# Patient Record
Sex: FEMALE | Race: ASIAN | Employment: OTHER | URBAN - METROPOLITAN AREA
[De-identification: names, ages, dates, MRNs, and addresses within clinical notes are randomized per-mention and may not be internally consistent; named-entity substitution may affect disease eponyms.]

---

## 2017-06-29 ENCOUNTER — OFFICE VISIT (OUTPATIENT)
Dept: INTERNAL MEDICINE CLINIC | Facility: CLINIC | Age: 72
End: 2017-06-29

## 2017-06-29 VITALS
BODY MASS INDEX: 27.09 KG/M2 | RESPIRATION RATE: 18 BRPM | SYSTOLIC BLOOD PRESSURE: 110 MMHG | HEART RATE: 72 BPM | HEIGHT: 60 IN | OXYGEN SATURATION: 98 % | TEMPERATURE: 98 F | WEIGHT: 138 LBS | DIASTOLIC BLOOD PRESSURE: 60 MMHG

## 2017-06-29 DIAGNOSIS — E55.9 VITAMIN D DEFICIENCY: ICD-10-CM

## 2017-06-29 DIAGNOSIS — E78.2 MIXED HYPERLIPIDEMIA: ICD-10-CM

## 2017-06-29 DIAGNOSIS — Z78.0 MENOPAUSE: ICD-10-CM

## 2017-06-29 DIAGNOSIS — Z12.31 VISIT FOR SCREENING MAMMOGRAM: ICD-10-CM

## 2017-06-29 DIAGNOSIS — I10 ESSENTIAL HYPERTENSION: Primary | ICD-10-CM

## 2017-06-29 PROCEDURE — 99204 OFFICE O/P NEW MOD 45 MIN: CPT | Performed by: INTERNAL MEDICINE

## 2017-06-29 RX ORDER — SIMVASTATIN 10 MG
TABLET ORAL
Qty: 90 TABLET | Refills: 4 | Status: SHIPPED | OUTPATIENT
Start: 2017-06-29 | End: 2018-07-17

## 2017-06-29 NOTE — PROGRESS NOTES
HPI:    Patient ID: Darryl Guo is a 67year old female. HPI    Patient is here to [facility]. 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      H/o HTN. Vitamin  D def, takes estrogen supplement. Since JESSICA BSO. Last mammogram was normal in 2015.   Patient  81 MG Oral Tab EC Take  by mouth. Disp:  Rfl:      Allergies:No Known Allergies   PHYSICAL EXAM:   Physical Exam   Constitutional: She is oriented to person, place, and time. She appears well-developed. No distress. HENT:   Head: Normocephalic.    Mouth/T 1,25 DIHYDROXY VITAMIN D (RENAL FAILURE) [32485][Q]    Meds This Visit:  Signed Prescriptions Disp Refills    simvastatin 10 MG Oral Tab 90 tablet 4      Sig: TAKE ONE TABLET BY MOUTH EVERY DAY IN THE EVENING      losartan 100 MG Oral Tab 90 tablet 3

## 2017-06-30 RX ORDER — LOSARTAN POTASSIUM 100 MG/1
100 TABLET ORAL DAILY
Qty: 90 TABLET | Refills: 3 | Status: SHIPPED | OUTPATIENT
Start: 2017-06-30 | End: 2017-07-11 | Stop reason: ALTCHOICE

## 2017-07-07 ENCOUNTER — LAB ENCOUNTER (OUTPATIENT)
Dept: LAB | Age: 72
End: 2017-07-07
Attending: INTERNAL MEDICINE
Payer: COMMERCIAL

## 2017-07-07 DIAGNOSIS — E78.2 MIXED HYPERLIPIDEMIA: ICD-10-CM

## 2017-07-07 DIAGNOSIS — I10 ESSENTIAL HYPERTENSION: ICD-10-CM

## 2017-07-07 LAB
ALBUMIN SERPL BCP-MCNC: 4.2 G/DL (ref 3.5–4.8)
ALBUMIN/GLOB SERPL: 1.6 {RATIO} (ref 1–2)
ALP SERPL-CCNC: 54 U/L (ref 32–100)
ALT SERPL-CCNC: 41 U/L (ref 14–54)
ANION GAP SERPL CALC-SCNC: 11 MMOL/L (ref 0–18)
AST SERPL-CCNC: 27 U/L (ref 15–41)
BASOPHILS # BLD: 0 K/UL (ref 0–0.2)
BASOPHILS NFR BLD: 1 %
BILIRUB SERPL-MCNC: 0.5 MG/DL (ref 0.3–1.2)
BUN SERPL-MCNC: 9 MG/DL (ref 8–20)
BUN/CREAT SERPL: 13.8 (ref 10–20)
CALCIUM SERPL-MCNC: 9.8 MG/DL (ref 8.5–10.5)
CHLORIDE SERPL-SCNC: 101 MMOL/L (ref 95–110)
CHOLEST SERPL-MCNC: 162 MG/DL (ref 110–200)
CO2 SERPL-SCNC: 27 MMOL/L (ref 22–32)
CREAT SERPL-MCNC: 0.65 MG/DL (ref 0.5–1.5)
EOSINOPHIL # BLD: 0.2 K/UL (ref 0–0.7)
EOSINOPHIL NFR BLD: 2 %
ERYTHROCYTE [DISTWIDTH] IN BLOOD BY AUTOMATED COUNT: 15.4 % (ref 11–15)
GLOBULIN PLAS-MCNC: 2.7 G/DL (ref 2.5–3.7)
GLUCOSE SERPL-MCNC: 90 MG/DL (ref 70–99)
HCT VFR BLD AUTO: 37.5 % (ref 35–48)
HDLC SERPL-MCNC: 62 MG/DL
HGB BLD-MCNC: 12.4 G/DL (ref 12–16)
LDLC SERPL CALC-MCNC: 65 MG/DL (ref 0–99)
LYMPHOCYTES # BLD: 2.1 K/UL (ref 1–4)
LYMPHOCYTES NFR BLD: 25 %
MCH RBC QN AUTO: 26.6 PG (ref 27–32)
MCHC RBC AUTO-ENTMCNC: 33.1 G/DL (ref 32–37)
MCV RBC AUTO: 80.3 FL (ref 80–100)
MONOCYTES # BLD: 0.7 K/UL (ref 0–1)
MONOCYTES NFR BLD: 8 %
NEUTROPHILS # BLD AUTO: 5.7 K/UL (ref 1.8–7.7)
NEUTROPHILS NFR BLD: 65 %
NONHDLC SERPL-MCNC: 100 MG/DL
OSMOLALITY UR CALC.SUM OF ELEC: 286 MOSM/KG (ref 275–295)
PLATELET # BLD AUTO: 236 K/UL (ref 140–400)
PMV BLD AUTO: 7.3 FL (ref 7.4–10.3)
POTASSIUM SERPL-SCNC: 4.5 MMOL/L (ref 3.3–5.1)
PROT SERPL-MCNC: 6.9 G/DL (ref 5.9–8.4)
RBC # BLD AUTO: 4.67 M/UL (ref 3.7–5.4)
SODIUM SERPL-SCNC: 139 MMOL/L (ref 136–144)
TRIGL SERPL-MCNC: 175 MG/DL (ref 1–149)
WBC # BLD AUTO: 8.8 K/UL (ref 4–11)

## 2017-07-07 PROCEDURE — 36415 COLL VENOUS BLD VENIPUNCTURE: CPT

## 2017-07-07 PROCEDURE — 80053 COMPREHEN METABOLIC PANEL: CPT

## 2017-07-07 PROCEDURE — 82652 VIT D 1 25-DIHYDROXY: CPT | Performed by: INTERNAL MEDICINE

## 2017-07-07 PROCEDURE — 85025 COMPLETE CBC W/AUTO DIFF WBC: CPT

## 2017-07-07 PROCEDURE — 80061 LIPID PANEL: CPT

## 2017-07-08 LAB — 1,25-DIHYDROXYVITAMIN D: 47.5 PG/ML

## 2017-07-10 ENCOUNTER — TELEPHONE (OUTPATIENT)
Dept: INTERNAL MEDICINE CLINIC | Facility: CLINIC | Age: 72
End: 2017-07-10

## 2017-07-11 RX ORDER — VALSARTAN 160 MG/1
160 TABLET ORAL DAILY
Qty: 90 TABLET | Refills: 2 | Status: SHIPPED | OUTPATIENT
Start: 2017-07-11 | End: 2018-07-17

## 2017-07-11 RX ORDER — OMEGA-3-ACID ETHYL ESTERS 1 G/1
1 CAPSULE, LIQUID FILLED ORAL DAILY
Qty: 90 CAPSULE | Refills: 3 | Status: SHIPPED | OUTPATIENT
Start: 2017-07-11 | End: 2018-07-17

## 2017-08-29 ENCOUNTER — HOSPITAL ENCOUNTER (OUTPATIENT)
Dept: MAMMOGRAPHY | Facility: HOSPITAL | Age: 72
Discharge: HOME | End: 2017-08-29
Attending: INTERNAL MEDICINE
Payer: COMMERCIAL

## 2017-08-29 ENCOUNTER — HOSPITAL ENCOUNTER (OUTPATIENT)
Dept: BONE DENSITY | Facility: HOSPITAL | Age: 72
Discharge: HOME | End: 2017-08-29
Attending: INTERNAL MEDICINE
Payer: COMMERCIAL

## 2017-08-29 DIAGNOSIS — Z78.0 MENOPAUSE: ICD-10-CM

## 2017-08-29 DIAGNOSIS — E55.9 VITAMIN D DEFICIENCY: ICD-10-CM

## 2017-08-29 DIAGNOSIS — Z12.31 VISIT FOR SCREENING MAMMOGRAM: ICD-10-CM

## 2017-08-29 PROCEDURE — 77067 SCR MAMMO BI INCL CAD: CPT | Performed by: INTERNAL MEDICINE

## 2017-08-29 PROCEDURE — 77080 DXA BONE DENSITY AXIAL: CPT | Performed by: INTERNAL MEDICINE

## 2017-10-03 ENCOUNTER — OFFICE VISIT (OUTPATIENT)
Dept: OPHTHALMOLOGY | Facility: CLINIC | Age: 72
End: 2017-10-03

## 2017-10-03 DIAGNOSIS — H43.393 VITREOUS FLOATERS OF BOTH EYES: ICD-10-CM

## 2017-10-03 DIAGNOSIS — H40.003 GLAUCOMA SUSPECT OF BOTH EYES: Primary | ICD-10-CM

## 2017-10-03 DIAGNOSIS — Z96.1 PSEUDOPHAKIA OF BOTH EYES: ICD-10-CM

## 2017-10-03 PROCEDURE — 92250 FUNDUS PHOTOGRAPHY W/I&R: CPT | Performed by: OPHTHALMOLOGY

## 2017-10-03 PROCEDURE — 92014 COMPRE OPH EXAM EST PT 1/>: CPT | Performed by: OPHTHALMOLOGY

## 2017-10-03 NOTE — PROGRESS NOTES
Bonnie Salgado is a 67year old female. HPI:     HPI     Here for an eye exam and photos. Pt denies any blurred vision OU at distance or near and is happy with her glasses and OTC readers. Pt has no ocular complaints.      Last edited by JOSUE Chaudhari Tab Take 1 tablet (160 mg total) by mouth daily. Disp: 90 tablet Rfl: 2   Misc Natural Products (FLEX-A-MIN JOINT FLEX OR) Take by mouth.  Takes two tablets daily Disp:  Rfl:    simvastatin 10 MG Oral Tab TAKE ONE TABLET BY MOUTH EVERY DAY IN THE Kaye Jesus -1.50 +0.75 105 +2.50    Type:  Progressive bifocal          Wearing Rx #2       Sphere Cylinder Axis Add    Right +2.50 Sphere      Left +2.50 Sphere      Type:  OTC reading only          Manifest Refraction     Pt is happy with current glasses

## 2017-10-21 ENCOUNTER — OFFICE VISIT (OUTPATIENT)
Dept: OPHTHALMOLOGY | Facility: CLINIC | Age: 72
End: 2017-10-21

## 2017-10-21 DIAGNOSIS — H40.003 GLAUCOMA SUSPECT OF BOTH EYES: ICD-10-CM

## 2017-10-21 PROCEDURE — 92083 EXTENDED VISUAL FIELD XM: CPT | Performed by: OPHTHALMOLOGY

## 2017-10-21 PROCEDURE — 92133 CPTRZD OPH DX IMG PST SGM ON: CPT | Performed by: OPHTHALMOLOGY

## 2017-10-21 NOTE — PROGRESS NOTES
Darryl Guo is a 67year old female. HPI:     HPI     Patient is here for a glaucoma work up with HVF and OCT, no PEDRO.MARIA ESTHER     Last edited by Terrence Burrell on 10/21/2017 10:03 AM. (History)        Patient History:  Past Medical History:   Diagnosis Date   • C (FLEX-A-MIN JOINT FLEX OR) Take by mouth.  Takes two tablets daily Disp:  Rfl:    simvastatin 10 MG Oral Tab TAKE ONE TABLET BY MOUTH EVERY DAY IN THE EVENING Disp: 90 tablet Rfl: 4   Estrogens Conjugated 0.3 MG Oral Tab Take 1.5 tablets (0.45 mg total) by

## 2018-07-17 ENCOUNTER — OFFICE VISIT (OUTPATIENT)
Dept: INTERNAL MEDICINE CLINIC | Facility: CLINIC | Age: 73
End: 2018-07-17
Payer: COMMERCIAL

## 2018-07-17 VITALS
HEART RATE: 55 BPM | HEIGHT: 60 IN | WEIGHT: 142 LBS | RESPIRATION RATE: 17 BRPM | SYSTOLIC BLOOD PRESSURE: 138 MMHG | TEMPERATURE: 98 F | DIASTOLIC BLOOD PRESSURE: 86 MMHG | BODY MASS INDEX: 27.88 KG/M2 | OXYGEN SATURATION: 98 %

## 2018-07-17 DIAGNOSIS — Z23 NEED FOR VACCINATION: ICD-10-CM

## 2018-07-17 DIAGNOSIS — M85.852 OSTEOPENIA OF NECK OF LEFT FEMUR: ICD-10-CM

## 2018-07-17 DIAGNOSIS — Z12.31 VISIT FOR SCREENING MAMMOGRAM: ICD-10-CM

## 2018-07-17 DIAGNOSIS — Z00.00 ENCOUNTER FOR ANNUAL HEALTH EXAMINATION: Primary | ICD-10-CM

## 2018-07-17 DIAGNOSIS — E78.2 MIXED HYPERLIPIDEMIA: ICD-10-CM

## 2018-07-17 DIAGNOSIS — E55.9 VITAMIN D DEFICIENCY: ICD-10-CM

## 2018-07-17 DIAGNOSIS — H25.019 CORTICAL AGE-RELATED CATARACT, UNSPECIFIED LATERALITY: ICD-10-CM

## 2018-07-17 LAB
ALBUMIN SERPL BCP-MCNC: 4.7 G/DL (ref 3.5–4.8)
ALBUMIN/GLOB SERPL: 1.6 {RATIO} (ref 1–2)
ALP SERPL-CCNC: 63 U/L (ref 32–100)
ALT SERPL-CCNC: 36 U/L (ref 14–54)
ANION GAP SERPL CALC-SCNC: 10 MMOL/L (ref 0–18)
AST SERPL-CCNC: 29 U/L (ref 15–41)
BASOPHILS # BLD: 0 K/UL (ref 0–0.2)
BASOPHILS NFR BLD: 1 %
BILIRUB SERPL-MCNC: 0.7 MG/DL (ref 0.3–1.2)
BUN SERPL-MCNC: 9 MG/DL (ref 8–20)
BUN/CREAT SERPL: 14.5 (ref 10–20)
CALCIUM SERPL-MCNC: 10.7 MG/DL (ref 8.5–10.5)
CHLORIDE SERPL-SCNC: 102 MMOL/L (ref 95–110)
CHOLEST SERPL-MCNC: 199 MG/DL (ref 110–200)
CO2 SERPL-SCNC: 27 MMOL/L (ref 22–32)
CREAT SERPL-MCNC: 0.62 MG/DL (ref 0.5–1.5)
EOSINOPHIL # BLD: 0.1 K/UL (ref 0–0.7)
EOSINOPHIL NFR BLD: 2 %
ERYTHROCYTE [DISTWIDTH] IN BLOOD BY AUTOMATED COUNT: 15.9 % (ref 11–15)
GLOBULIN PLAS-MCNC: 3 G/DL (ref 2.5–3.7)
GLUCOSE SERPL-MCNC: 61 MG/DL (ref 70–99)
HCT VFR BLD AUTO: 40.1 % (ref 35–48)
HDLC SERPL-MCNC: 78 MG/DL
HGB BLD-MCNC: 13 G/DL (ref 12–16)
LDLC SERPL CALC-MCNC: 94 MG/DL (ref 0–99)
LYMPHOCYTES # BLD: 2.1 K/UL (ref 1–4)
LYMPHOCYTES NFR BLD: 34 %
MCH RBC QN AUTO: 26.5 PG (ref 27–32)
MCHC RBC AUTO-ENTMCNC: 32.4 G/DL (ref 32–37)
MCV RBC AUTO: 81.8 FL (ref 80–100)
MONOCYTES # BLD: 0.5 K/UL (ref 0–1)
MONOCYTES NFR BLD: 8 %
NEUTROPHILS # BLD AUTO: 3.5 K/UL (ref 1.8–7.7)
NEUTROPHILS NFR BLD: 55 %
NONHDLC SERPL-MCNC: 121 MG/DL
OSMOLALITY UR CALC.SUM OF ELEC: 285 MOSM/KG (ref 275–295)
PATIENT FASTING: YES
PLATELET # BLD AUTO: 243 K/UL (ref 140–400)
PMV BLD AUTO: 7.6 FL (ref 7.4–10.3)
POTASSIUM SERPL-SCNC: 4.6 MMOL/L (ref 3.3–5.1)
PROT SERPL-MCNC: 7.7 G/DL (ref 5.9–8.4)
RBC # BLD AUTO: 4.91 M/UL (ref 3.7–5.4)
SODIUM SERPL-SCNC: 139 MMOL/L (ref 136–144)
TRIGL SERPL-MCNC: 134 MG/DL (ref 1–149)
WBC # BLD AUTO: 6.3 K/UL (ref 4–11)

## 2018-07-17 PROCEDURE — 80061 LIPID PANEL: CPT | Performed by: INTERNAL MEDICINE

## 2018-07-17 PROCEDURE — 82306 VITAMIN D 25 HYDROXY: CPT | Performed by: INTERNAL MEDICINE

## 2018-07-17 PROCEDURE — 99397 PER PM REEVAL EST PAT 65+ YR: CPT | Performed by: INTERNAL MEDICINE

## 2018-07-17 PROCEDURE — 80053 COMPREHEN METABOLIC PANEL: CPT | Performed by: INTERNAL MEDICINE

## 2018-07-17 PROCEDURE — 85025 COMPLETE CBC W/AUTO DIFF WBC: CPT | Performed by: INTERNAL MEDICINE

## 2018-07-17 RX ORDER — VALSARTAN 160 MG/1
160 TABLET ORAL DAILY
Qty: 90 TABLET | Refills: 4 | Status: SHIPPED | OUTPATIENT
Start: 2018-07-17 | End: 2018-07-21 | Stop reason: ALTCHOICE

## 2018-07-17 RX ORDER — SIMVASTATIN 10 MG
TABLET ORAL
Qty: 90 TABLET | Refills: 4 | Status: SHIPPED | OUTPATIENT
Start: 2018-07-17 | End: 2019-07-26

## 2018-07-17 RX ORDER — OMEGA-3-ACID ETHYL ESTERS 1 G/1
1 CAPSULE, LIQUID FILLED ORAL DAILY
Qty: 90 CAPSULE | Refills: 3 | Status: SHIPPED | OUTPATIENT
Start: 2018-07-17 | End: 2019-09-12

## 2018-07-17 NOTE — PROGRESS NOTES
HPI:   Jeff Segovia is a 68year old female who presents for a Medicare Subsequent Annual Wellness visit (Pt already had Initial Annual Wellness). Retired RN, h/o HTN, osteoarthritis, glaucoma. BP sometimes 150 to 160/80- 90.  Maintains active lifesty advance directive. Form given to her. She does NOT have a Power of  for [employer] on file in [place]. Discussed POA> form given.           She has never smoked tobacco.    CAGE Alcohol screening   Nemo Ortiz was screened for Alcohol abuse and THE EVENING      MEDICAL INFORMATION:   She  has a past medical history of Cataract (2007); Cataract (2007); H/O hemorrhoidectomy (1983); HTN (hypertension); Lipid screening (4/29/2014); Ophthalmological disorder (2007); and Vitreous floaters (2007).     Sh BMI 27.73 kg/m²  Estimated body mass index is 27.73 kg/m² as calculated from the following:    Height as of this encounter: 60\". Weight as of this encounter: 142 lb.     Medicare Hearing Assessment  (Required for AWV/SWV)    Whispered Voice  No impairme 12/31/2008   Deferred Date(s) Deferred   • Pneumovax 23 07/17/2018        ASSESSMENT AND OTHER RELEVANT CHRONIC CONDITIONS:   Monique Vázquez is a 68year old female who presents for a Medicare Assessment.      PLAN SUMMARY:   Diagnoses and all orders for this vi patient  PREVENTATIVE SERVICES  INDICATIONS AND SCHEDULE Internal Lab or Procedure External Lab or Procedure   Diabetes Screening      HbgA1C   Annually No results found for: A1C No flowsheet data found.     Fasting Blood Sugar (FSB)Annually   Glucose (mg/d pharmacy.  Please get once after your 65th birthday    Hepatitis B for Moderate/High Risk No vaccine history found Medium/high risk factors:   End-stage renal disease   Hemophiliacs who received Factor VIII or IX concentrates   Clients of institutions for t

## 2018-07-17 NOTE — PATIENT INSTRUCTIONS
Sandi Blank SCREENING SCHEDULE   Tests on this list are recommended by your physician but may not be covered, or covered at this frequency, by your insurer. Please check with your insurance carrier before scheduling to verify coverage.    PREVENTATIVE SER Colorectal Cancer Screening  Covered up to Age 76     Colonoscopy Screen   Covered every 10 years- more often if abnormal Colonoscopy,10 Years due on 07/09/2025 Update Health Maintenance if applicable    Flex Sigmoidoscopy Screen  Covered every 5 years N this or any previous visit.  Please get once after your 65th birthday    Pneumococcal 23 (Pneumovax)  Covered Once after 65   Orders placed or performed in visit on 07/17/18  -PNEUMOCOCCAL IMM (PNEUMOVAX)    Please get once after your 65th birthday    Nani Aragon

## 2018-07-18 PROBLEM — M85.852 OSTEOPENIA OF NECK OF LEFT FEMUR: Status: ACTIVE | Noted: 2018-07-18

## 2018-07-18 LAB — 25(OH)D3 SERPL-MCNC: 41.3 NG/ML

## 2018-09-04 ENCOUNTER — HOSPITAL ENCOUNTER (OUTPATIENT)
Dept: MAMMOGRAPHY | Facility: HOSPITAL | Age: 73
Discharge: HOME | End: 2018-09-04
Attending: INTERNAL MEDICINE
Payer: COMMERCIAL

## 2018-09-04 DIAGNOSIS — Z12.31 VISIT FOR SCREENING MAMMOGRAM: ICD-10-CM

## 2018-09-04 PROCEDURE — 77067 SCR MAMMO BI INCL CAD: CPT | Performed by: INTERNAL MEDICINE

## 2018-09-04 PROCEDURE — 77063 BREAST TOMOSYNTHESIS BI: CPT | Performed by: INTERNAL MEDICINE

## 2018-09-06 ENCOUNTER — TELEPHONE (OUTPATIENT)
Dept: INTERNAL MEDICINE CLINIC | Facility: CLINIC | Age: 73
End: 2018-09-06

## 2018-09-07 NOTE — TELEPHONE ENCOUNTER
Patient called again about her BP medication,     Taking valsartan before, but now on Irbesartan states that it is not strong enough -- SBP 140s-150s/DBP 80s-90s in the am after taking a dose at night.     Patient states she decided to take two tablets a da

## 2018-09-07 NOTE — TELEPHONE ENCOUNTER
Patient had increased Irbesartan to 150 mgs BID. Her  BP is now well controlled. P : Continue Irbesartan 150 mgs BID.

## 2018-11-08 ENCOUNTER — OFFICE VISIT (OUTPATIENT)
Dept: OPHTHALMOLOGY | Facility: CLINIC | Age: 73
End: 2018-11-08
Payer: COMMERCIAL

## 2018-11-08 DIAGNOSIS — H40.003 GLAUCOMA SUSPECT OF BOTH EYES: Primary | ICD-10-CM

## 2018-11-08 DIAGNOSIS — H43.393 VITREOUS FLOATERS OF BOTH EYES: ICD-10-CM

## 2018-11-08 DIAGNOSIS — Z96.1 PSEUDOPHAKIA OF BOTH EYES: ICD-10-CM

## 2018-11-08 PROCEDURE — 92014 COMPRE OPH EXAM EST PT 1/>: CPT | Performed by: OPHTHALMOLOGY

## 2018-11-08 NOTE — PATIENT INSTRUCTIONS
Glaucoma suspect  Patient is a glaucoma suspect due to increased cupping of the optic nerves in both eyes. Patient had normal glaucoma diagnostics last year. IOP is normal today. Optic nerves are stable.   There is no diagnosis of glaucoma at this time, b

## 2018-11-08 NOTE — PROGRESS NOTES
Garymeagan Jones is a 68year old female. HPI:     HPI     Pt denies any blurred vision at distance or near and is happy with her glasses. Pt has no ocular complaints.      Last edited by Chandni Biswas O.T. on 11/8/2018  1:39 PM. (History)        Patient H Tab TAKE ONE TABLET BY MOUTH EVERY DAY IN THE EVENING Disp: 90 tablet Rfl: 4   Misc Natural Products (FLEX-A-MIN JOINT FLEX OR) Take by mouth.  Takes two tablets daily Disp:  Rfl:    Estrogens Conjugated 0.3 MG Oral Tab Take 1.5 tablets (0.45 mg total) by m Progressive bifocal          Wearing Rx #2       Sphere Cylinder Axis Add    Right +2.50 Sphere      Left +2.50 Sphere      Type:  OTC reading only          Manifest Refraction    Pt is happy with current Rx                  ASSESSMENT/PLAN:     Diagnoses

## 2018-12-20 ENCOUNTER — OFFICE VISIT (OUTPATIENT)
Dept: INTERNAL MEDICINE CLINIC | Facility: CLINIC | Age: 73
End: 2018-12-20
Payer: COMMERCIAL

## 2018-12-20 VITALS
BODY MASS INDEX: 28.66 KG/M2 | RESPIRATION RATE: 19 BRPM | DIASTOLIC BLOOD PRESSURE: 60 MMHG | TEMPERATURE: 98 F | WEIGHT: 146 LBS | HEIGHT: 60 IN | OXYGEN SATURATION: 98 % | SYSTOLIC BLOOD PRESSURE: 120 MMHG | HEART RATE: 73 BPM

## 2018-12-20 DIAGNOSIS — E66.3 OVERWEIGHT (BMI 25.0-29.9): ICD-10-CM

## 2018-12-20 DIAGNOSIS — E83.52 HYPERCALCEMIA: ICD-10-CM

## 2018-12-20 DIAGNOSIS — I10 ESSENTIAL HYPERTENSION: Primary | ICD-10-CM

## 2018-12-20 DIAGNOSIS — E78.5 HYPERLIPIDEMIA LDL GOAL <100: ICD-10-CM

## 2018-12-20 DIAGNOSIS — Z23 NEED FOR PROPHYLACTIC VACCINATION WITH COMBINED DIPHTHERIA-TETANUS-PERTUSSIS (DTP) VACCINE: ICD-10-CM

## 2018-12-20 DIAGNOSIS — Z23 NEED FOR VACCINATION FOR ZOSTER: ICD-10-CM

## 2018-12-20 DIAGNOSIS — Z23 NEED FOR PROPHYLACTIC VACCINATION AGAINST STREPTOCOCCUS PNEUMONIAE (PNEUMOCOCCUS): ICD-10-CM

## 2018-12-20 PROCEDURE — 99214 OFFICE O/P EST MOD 30 MIN: CPT | Performed by: INTERNAL MEDICINE

## 2018-12-20 NOTE — PROGRESS NOTES
HPI:    Patient ID: Josie Chaidez is a 68year old female. HPI  Here for f/u. Doing well. Gained 4 kbs in last 6 mos. Keeping herself active. HTN : BP averaging 120/70s. Tolerated meds. Reports no chest pain, ATKINS.      Hyperlipidemia : No myalgia with st Disp:  Rfl:    Estrogens Conjugated 0.3 MG Oral Tab Take 1.5 tablets (0.45 mg total) by mouth daily. (Patient taking differently: Take 0.45 mg by mouth daily.  Takes 1/2 tab every 5 days ) Disp: 90 tablet Rfl: 3   Vitamin D3 (VITAMIN D3) 2000 UNITS Oral Cap repeat lab and hypercalcemia work up. PTH normal in the past.   Avoid Ca supplement. Adequate hydration     4. Overweight (BMI 25.0-29. 9)  Low fat, low carbs, lean protein diet. Aerobic exercise 30 mins 3 x weekly     5.  Need for prophylactic vaccination

## 2019-07-26 ENCOUNTER — HOSPITAL ENCOUNTER (OUTPATIENT)
Dept: GENERAL RADIOLOGY | Facility: HOSPITAL | Age: 74
Discharge: HOME | End: 2019-07-26
Attending: INTERNAL MEDICINE
Payer: COMMERCIAL

## 2019-07-26 ENCOUNTER — OFFICE VISIT (OUTPATIENT)
Dept: INTERNAL MEDICINE CLINIC | Facility: CLINIC | Age: 74
End: 2019-07-26
Payer: COMMERCIAL

## 2019-07-26 VITALS
HEART RATE: 60 BPM | WEIGHT: 147 LBS | RESPIRATION RATE: 17 BRPM | OXYGEN SATURATION: 97 % | DIASTOLIC BLOOD PRESSURE: 60 MMHG | HEIGHT: 60 IN | SYSTOLIC BLOOD PRESSURE: 118 MMHG | BODY MASS INDEX: 28.86 KG/M2

## 2019-07-26 DIAGNOSIS — E55.9 VITAMIN D DEFICIENCY: ICD-10-CM

## 2019-07-26 DIAGNOSIS — I10 ESSENTIAL HYPERTENSION: ICD-10-CM

## 2019-07-26 DIAGNOSIS — R06.00 DOE (DYSPNEA ON EXERTION): ICD-10-CM

## 2019-07-26 DIAGNOSIS — Z00.00 GENERAL MEDICAL EXAM: Primary | ICD-10-CM

## 2019-07-26 DIAGNOSIS — E78.5 HYPERLIPIDEMIA LDL GOAL <100: ICD-10-CM

## 2019-07-26 DIAGNOSIS — Z12.39 BREAST CANCER SCREENING: ICD-10-CM

## 2019-07-26 DIAGNOSIS — Z78.0 POSTMENOPAUSE: ICD-10-CM

## 2019-07-26 LAB
ALBUMIN SERPL-MCNC: 4.1 G/DL (ref 3.4–5)
ALBUMIN/GLOB SERPL: 1.2 {RATIO} (ref 1–2)
ALP LIVER SERPL-CCNC: 70 U/L (ref 55–142)
ALT SERPL-CCNC: 44 U/L (ref 13–56)
ANION GAP SERPL CALC-SCNC: 9 MMOL/L (ref 0–18)
AST SERPL-CCNC: 26 U/L (ref 15–37)
BASOPHILS # BLD AUTO: 0.04 X10(3) UL (ref 0–0.2)
BASOPHILS NFR BLD AUTO: 0.7 %
BILIRUB SERPL-MCNC: 0.5 MG/DL (ref 0.1–2)
BUN BLD-MCNC: 10 MG/DL (ref 7–18)
BUN/CREAT SERPL: 14.1 (ref 10–20)
CALCIUM BLD-MCNC: 9.6 MG/DL (ref 8.5–10.1)
CHLORIDE SERPL-SCNC: 105 MMOL/L (ref 98–112)
CHOLEST SMN-MCNC: 184 MG/DL (ref ?–200)
CO2 SERPL-SCNC: 29 MMOL/L (ref 21–32)
CREAT BLD-MCNC: 0.71 MG/DL (ref 0.55–1.02)
DEPRECATED RDW RBC AUTO: 46.1 FL (ref 35.1–46.3)
EOSINOPHIL # BLD AUTO: 0.16 X10(3) UL (ref 0–0.7)
EOSINOPHIL NFR BLD AUTO: 2.7 %
ERYTHROCYTE [DISTWIDTH] IN BLOOD BY AUTOMATED COUNT: 14.5 % (ref 11–15)
GLOBULIN PLAS-MCNC: 3.4 G/DL (ref 2.8–4.4)
GLUCOSE BLD-MCNC: 86 MG/DL (ref 70–99)
HCT VFR BLD AUTO: 40.1 % (ref 35–48)
HDLC SERPL-MCNC: 77 MG/DL (ref 40–59)
HGB BLD-MCNC: 12.5 G/DL (ref 12–16)
IMM GRANULOCYTES # BLD AUTO: 0.01 X10(3) UL (ref 0–1)
IMM GRANULOCYTES NFR BLD: 0.2 %
LDLC SERPL CALC-MCNC: 73 MG/DL (ref ?–100)
LYMPHOCYTES # BLD AUTO: 1.9 X10(3) UL (ref 1–4)
LYMPHOCYTES NFR BLD AUTO: 31.9 %
M PROTEIN MFR SERPL ELPH: 7.5 G/DL (ref 6.4–8.2)
MCH RBC QN AUTO: 27.2 PG (ref 26–34)
MCHC RBC AUTO-ENTMCNC: 31.2 G/DL (ref 31–37)
MCV RBC AUTO: 87.4 FL (ref 80–100)
MONOCYTES # BLD AUTO: 0.54 X10(3) UL (ref 0.1–1)
MONOCYTES NFR BLD AUTO: 9.1 %
NEUTROPHILS # BLD AUTO: 3.3 X10 (3) UL (ref 1.5–7.7)
NEUTROPHILS # BLD AUTO: 3.3 X10(3) UL (ref 1.5–7.7)
NEUTROPHILS NFR BLD AUTO: 55.4 %
NONHDLC SERPL-MCNC: 107 MG/DL (ref ?–130)
OSMOLALITY SERPL CALC.SUM OF ELEC: 294 MOSM/KG (ref 275–295)
PATIENT FASTING: YES
PATIENT FASTING: YES
PLATELET # BLD AUTO: 218 10(3)UL (ref 150–450)
POTASSIUM SERPL-SCNC: 4.6 MMOL/L (ref 3.5–5.1)
RBC # BLD AUTO: 4.59 X10(6)UL (ref 3.8–5.3)
SODIUM SERPL-SCNC: 143 MMOL/L (ref 136–145)
TRIGL SERPL-MCNC: 168 MG/DL (ref 30–149)
VLDLC SERPL CALC-MCNC: 34 MG/DL (ref 0–30)
WBC # BLD AUTO: 6 X10(3) UL (ref 4–11)

## 2019-07-26 PROCEDURE — 82306 VITAMIN D 25 HYDROXY: CPT | Performed by: INTERNAL MEDICINE

## 2019-07-26 PROCEDURE — 80061 LIPID PANEL: CPT | Performed by: INTERNAL MEDICINE

## 2019-07-26 PROCEDURE — 80053 COMPREHEN METABOLIC PANEL: CPT | Performed by: INTERNAL MEDICINE

## 2019-07-26 PROCEDURE — 99397 PER PM REEVAL EST PAT 65+ YR: CPT | Performed by: INTERNAL MEDICINE

## 2019-07-26 PROCEDURE — 71046 X-RAY EXAM CHEST 2 VIEWS: CPT | Performed by: INTERNAL MEDICINE

## 2019-07-26 PROCEDURE — 85025 COMPLETE CBC W/AUTO DIFF WBC: CPT | Performed by: INTERNAL MEDICINE

## 2019-07-26 PROCEDURE — 93000 ELECTROCARDIOGRAM COMPLETE: CPT | Performed by: INTERNAL MEDICINE

## 2019-07-26 RX ORDER — IRBESARTAN 150 MG/1
TABLET ORAL
Qty: 180 TABLET | Refills: 4 | Status: SHIPPED | OUTPATIENT
Start: 2019-07-26 | End: 2020-08-07

## 2019-07-26 RX ORDER — SIMVASTATIN 10 MG
TABLET ORAL
Qty: 90 TABLET | Refills: 4 | Status: SHIPPED | OUTPATIENT
Start: 2019-07-26 | End: 2020-08-11

## 2019-07-26 NOTE — PROGRESS NOTES
Pat De La Torre is a 76year old female who presents for a complete physical exam. Symptoms: denies discharge, itching, burning or dysuria, is menopausal.     H/o HTN, HLD. C/o chronic dry cough  X > 1 mo. She has ATKINS. Non-smoker, no asthma. Unable to keep up p (ASPIRIN EC LOW DOSE) 81 MG Oral Tab EC Take  by mouth.  Disp:  Rfl:       Past Medical History:   Diagnosis Date   • Cataract 2007    OD   • Cataract 2007    OS   • H/O hemorrhoidectomy 1983   • HTN (hypertension)    • Lipid screening 4/29/2014    per:NG pain   LUNGS: ATKINS  CARDIOVASCULAR: denies chest pain on exertion  GI: denies abdominal pain,denies heartburn  : denies dysuria, vaginal discharge or itching,p  MUSCULOSKELETAL: denies back pain  NEURO: denies headaches  PSYCHE: denies depression or anxie soon.  s     Body mass index is 28.71 kg/m². , recommended low fat , low carbs, high fiber, lean protein diett and aerobic exercise 30 minutes three times weekly as tolerated.      Postmenopause : check Bone dexa     PHQ2 score 0    The patient indicates und

## 2019-07-29 LAB — 25(OH)D3 SERPL-MCNC: 40.1 NG/ML (ref 30–100)

## 2019-08-01 DIAGNOSIS — I51.7 CARDIOMEGALY: ICD-10-CM

## 2019-08-01 DIAGNOSIS — R06.00 DOE (DYSPNEA ON EXERTION): Primary | ICD-10-CM

## 2019-09-10 ENCOUNTER — HOSPITAL ENCOUNTER (OUTPATIENT)
Dept: MAMMOGRAPHY | Facility: HOSPITAL | Age: 74
Discharge: HOME | End: 2019-09-10
Attending: INTERNAL MEDICINE
Payer: COMMERCIAL

## 2019-09-10 DIAGNOSIS — Z12.39 BREAST CANCER SCREENING: ICD-10-CM

## 2019-09-10 PROCEDURE — 77063 BREAST TOMOSYNTHESIS BI: CPT | Performed by: INTERNAL MEDICINE

## 2019-09-10 PROCEDURE — 77067 SCR MAMMO BI INCL CAD: CPT | Performed by: INTERNAL MEDICINE

## 2019-09-12 RX ORDER — OMEGA-3-ACID ETHYL ESTERS 1 G/1
1 CAPSULE, LIQUID FILLED ORAL DAILY
Qty: 90 CAPSULE | Refills: 3 | Status: SHIPPED | OUTPATIENT
Start: 2019-09-12 | End: 2019-09-13

## 2019-09-13 RX ORDER — OMEGA-3-ACID ETHYL ESTERS 1 G/1
1 CAPSULE, LIQUID FILLED ORAL DAILY
Qty: 90 CAPSULE | Refills: 3 | Status: SHIPPED | OUTPATIENT
Start: 2019-09-13 | End: 2020-08-11

## 2019-09-19 ENCOUNTER — HOSPITAL ENCOUNTER (OUTPATIENT)
Dept: CV DIAGNOSTICS | Facility: HOSPITAL | Age: 74
Discharge: HOME | End: 2019-09-19
Attending: INTERNAL MEDICINE
Payer: COMMERCIAL

## 2019-09-19 DIAGNOSIS — I51.7 CARDIOMEGALY: ICD-10-CM

## 2019-09-19 DIAGNOSIS — R06.00 DOE (DYSPNEA ON EXERTION): ICD-10-CM

## 2019-09-19 PROCEDURE — 93306 TTE W/DOPPLER COMPLETE: CPT | Performed by: INTERNAL MEDICINE

## 2019-11-21 ENCOUNTER — OFFICE VISIT (OUTPATIENT)
Dept: OPHTHALMOLOGY | Facility: CLINIC | Age: 74
End: 2019-11-21
Payer: COMMERCIAL

## 2019-11-21 DIAGNOSIS — H43.393 VITREOUS FLOATERS OF BOTH EYES: ICD-10-CM

## 2019-11-21 DIAGNOSIS — Z96.1 PSEUDOPHAKIA OF BOTH EYES: ICD-10-CM

## 2019-11-21 DIAGNOSIS — H40.003 GLAUCOMA SUSPECT OF BOTH EYES: Primary | ICD-10-CM

## 2019-11-21 PROCEDURE — 92014 COMPRE OPH EXAM EST PT 1/>: CPT | Performed by: OPHTHALMOLOGY

## 2019-11-21 PROCEDURE — 92015 DETERMINE REFRACTIVE STATE: CPT | Performed by: OPHTHALMOLOGY

## 2019-11-21 PROCEDURE — 92250 FUNDUS PHOTOGRAPHY W/I&R: CPT | Performed by: OPHTHALMOLOGY

## 2019-11-22 ENCOUNTER — TELEPHONE (OUTPATIENT)
Dept: OTOLARYNGOLOGY | Facility: CLINIC | Age: 74
End: 2019-11-22

## 2019-11-22 NOTE — TELEPHONE ENCOUNTER
Patient was scheduled for EP/ VF, OCT with no MD- with tech on 12/21 but patient asking to reschedule. Please advise.

## 2019-12-14 ENCOUNTER — NURSE ONLY (OUTPATIENT)
Dept: OPHTHALMOLOGY | Facility: CLINIC | Age: 74
End: 2019-12-14
Payer: COMMERCIAL

## 2019-12-14 DIAGNOSIS — H40.003 GLAUCOMA SUSPECT OF BOTH EYES: ICD-10-CM

## 2019-12-14 PROCEDURE — 92083 EXTENDED VISUAL FIELD XM: CPT | Performed by: OPHTHALMOLOGY

## 2019-12-14 PROCEDURE — 92133 CPTRZD OPH DX IMG PST SGM ON: CPT | Performed by: OPHTHALMOLOGY

## 2019-12-14 NOTE — PROGRESS NOTES
Lalo Winslow is a 76year old female.     HPI:     HPI     Here for a VF and OCT no MD.     Last edited by Serenity Curiel O.T. on 12/14/2019 12:07 PM. (History)        Patient History:  Past Medical History:   Diagnosis Date   • Cataract 2007    O EVERY DAY IN THE EVENING 90 tablet 4   • Misc Natural Products (FLEX-A-MIN JOINT FLEX OR) Take by mouth. Takes two tablets daily     • Vitamin D3 (VITAMIN D3) 2000 UNITS Oral Cap Take  by mouth. • Multiple Vitamins Oral Tab Take  by mouth.      • aspiri

## 2020-08-04 ENCOUNTER — TELEPHONE (OUTPATIENT)
Dept: INTERNAL MEDICINE CLINIC | Facility: CLINIC | Age: 75
End: 2020-08-04

## 2020-08-06 DIAGNOSIS — Z00.00 GENERAL MEDICAL EXAM: ICD-10-CM

## 2020-08-06 DIAGNOSIS — E55.9 VITAMIN D DEFICIENCY: ICD-10-CM

## 2020-08-06 DIAGNOSIS — I10 ESSENTIAL HYPERTENSION: ICD-10-CM

## 2020-08-06 DIAGNOSIS — E78.2 MIXED HYPERLIPIDEMIA: ICD-10-CM

## 2020-08-06 DIAGNOSIS — Z12.31 VISIT FOR SCREENING MAMMOGRAM: Primary | ICD-10-CM

## 2020-08-07 RX ORDER — IRBESARTAN 150 MG/1
TABLET ORAL
Qty: 180 TABLET | Refills: 3 | Status: SHIPPED | OUTPATIENT
Start: 2020-08-07 | End: 2020-08-11

## 2020-08-11 ENCOUNTER — OFFICE VISIT (OUTPATIENT)
Dept: INTERNAL MEDICINE CLINIC | Facility: CLINIC | Age: 75
End: 2020-08-11
Payer: COMMERCIAL

## 2020-08-11 VITALS
OXYGEN SATURATION: 96 % | RESPIRATION RATE: 22 BRPM | HEIGHT: 60 IN | BODY MASS INDEX: 28.27 KG/M2 | HEART RATE: 64 BPM | SYSTOLIC BLOOD PRESSURE: 130 MMHG | WEIGHT: 144 LBS | DIASTOLIC BLOOD PRESSURE: 60 MMHG | TEMPERATURE: 97 F

## 2020-08-11 DIAGNOSIS — I10 ESSENTIAL HYPERTENSION: ICD-10-CM

## 2020-08-11 DIAGNOSIS — Z12.31 ENCOUNTER FOR SCREENING MAMMOGRAM FOR MALIGNANT NEOPLASM OF BREAST: ICD-10-CM

## 2020-08-11 DIAGNOSIS — E78.5 HYPERLIPIDEMIA LDL GOAL <100: ICD-10-CM

## 2020-08-11 DIAGNOSIS — M25.562 CHRONIC PAIN OF LEFT KNEE: ICD-10-CM

## 2020-08-11 DIAGNOSIS — E55.9 VITAMIN D DEFICIENCY: ICD-10-CM

## 2020-08-11 DIAGNOSIS — Z00.00 ROUTINE GENERAL MEDICAL EXAMINATION AT A HEALTH CARE FACILITY: Primary | ICD-10-CM

## 2020-08-11 DIAGNOSIS — E78.2 MIXED HYPERLIPIDEMIA: ICD-10-CM

## 2020-08-11 DIAGNOSIS — G89.29 CHRONIC PAIN OF LEFT KNEE: ICD-10-CM

## 2020-08-11 DIAGNOSIS — E66.3 OVERWEIGHT (BMI 25.0-29.9): ICD-10-CM

## 2020-08-11 DIAGNOSIS — Z78.0 POSTMENOPAUSAL: ICD-10-CM

## 2020-08-11 LAB
ALBUMIN SERPL-MCNC: 4 G/DL (ref 3.4–5)
ALBUMIN/GLOB SERPL: 1.1 {RATIO} (ref 1–2)
ALP LIVER SERPL-CCNC: 81 U/L (ref 55–142)
ALT SERPL-CCNC: 32 U/L (ref 13–56)
ANION GAP SERPL CALC-SCNC: 7 MMOL/L (ref 0–18)
AST SERPL-CCNC: 18 U/L (ref 15–37)
BASOPHILS # BLD AUTO: 0.04 X10(3) UL (ref 0–0.2)
BASOPHILS NFR BLD AUTO: 0.6 %
BILIRUB SERPL-MCNC: 0.5 MG/DL (ref 0.1–2)
BUN BLD-MCNC: 10 MG/DL (ref 7–18)
BUN/CREAT SERPL: 14.9 (ref 10–20)
CALCIUM BLD-MCNC: 9.8 MG/DL (ref 8.5–10.1)
CHLORIDE SERPL-SCNC: 102 MMOL/L (ref 98–112)
CHOLEST SMN-MCNC: 192 MG/DL (ref ?–200)
CO2 SERPL-SCNC: 28 MMOL/L (ref 21–32)
CREAT BLD-MCNC: 0.67 MG/DL (ref 0.55–1.02)
DEPRECATED RDW RBC AUTO: 43.7 FL (ref 35.1–46.3)
EOSINOPHIL # BLD AUTO: 0.15 X10(3) UL (ref 0–0.7)
EOSINOPHIL NFR BLD AUTO: 2.3 %
ERYTHROCYTE [DISTWIDTH] IN BLOOD BY AUTOMATED COUNT: 14.3 % (ref 11–15)
GLOBULIN PLAS-MCNC: 3.5 G/DL (ref 2.8–4.4)
GLUCOSE BLD-MCNC: 84 MG/DL (ref 70–99)
HCT VFR BLD AUTO: 38.8 % (ref 35–48)
HDLC SERPL-MCNC: 74 MG/DL (ref 40–59)
HGB BLD-MCNC: 12.5 G/DL (ref 12–16)
IMM GRANULOCYTES # BLD AUTO: 0.02 X10(3) UL (ref 0–1)
IMM GRANULOCYTES NFR BLD: 0.3 %
LDLC SERPL CALC-MCNC: 89 MG/DL (ref ?–100)
LYMPHOCYTES # BLD AUTO: 2.05 X10(3) UL (ref 1–4)
LYMPHOCYTES NFR BLD AUTO: 32.1 %
M PROTEIN MFR SERPL ELPH: 7.5 G/DL (ref 6.4–8.2)
MCH RBC QN AUTO: 27.4 PG (ref 26–34)
MCHC RBC AUTO-ENTMCNC: 32.2 G/DL (ref 31–37)
MCV RBC AUTO: 84.9 FL (ref 80–100)
MONOCYTES # BLD AUTO: 0.61 X10(3) UL (ref 0.1–1)
MONOCYTES NFR BLD AUTO: 9.5 %
NEUTROPHILS # BLD AUTO: 3.52 X10 (3) UL (ref 1.5–7.7)
NEUTROPHILS # BLD AUTO: 3.52 X10(3) UL (ref 1.5–7.7)
NEUTROPHILS NFR BLD AUTO: 55.2 %
NONHDLC SERPL-MCNC: 118 MG/DL (ref ?–130)
OSMOLALITY SERPL CALC.SUM OF ELEC: 282 MOSM/KG (ref 275–295)
PATIENT FASTING Y/N/NP: YES
PATIENT FASTING Y/N/NP: YES
PLATELET # BLD AUTO: 220 10(3)UL (ref 150–450)
POTASSIUM SERPL-SCNC: 4.3 MMOL/L (ref 3.5–5.1)
RBC # BLD AUTO: 4.57 X10(6)UL (ref 3.8–5.3)
SODIUM SERPL-SCNC: 137 MMOL/L (ref 136–145)
TRIGL SERPL-MCNC: 145 MG/DL (ref 30–149)
VLDLC SERPL CALC-MCNC: 29 MG/DL (ref 0–30)
WBC # BLD AUTO: 6.4 X10(3) UL (ref 4–11)

## 2020-08-11 PROCEDURE — 3008F BODY MASS INDEX DOCD: CPT | Performed by: INTERNAL MEDICINE

## 2020-08-11 PROCEDURE — 85025 COMPLETE CBC W/AUTO DIFF WBC: CPT | Performed by: INTERNAL MEDICINE

## 2020-08-11 PROCEDURE — 99397 PER PM REEVAL EST PAT 65+ YR: CPT | Performed by: INTERNAL MEDICINE

## 2020-08-11 PROCEDURE — 80061 LIPID PANEL: CPT | Performed by: INTERNAL MEDICINE

## 2020-08-11 PROCEDURE — 80053 COMPREHEN METABOLIC PANEL: CPT | Performed by: INTERNAL MEDICINE

## 2020-08-11 PROCEDURE — 36415 COLL VENOUS BLD VENIPUNCTURE: CPT | Performed by: INTERNAL MEDICINE

## 2020-08-11 PROCEDURE — 82306 VITAMIN D 25 HYDROXY: CPT | Performed by: INTERNAL MEDICINE

## 2020-08-11 PROCEDURE — 3075F SYST BP GE 130 - 139MM HG: CPT | Performed by: INTERNAL MEDICINE

## 2020-08-11 PROCEDURE — 3078F DIAST BP <80 MM HG: CPT | Performed by: INTERNAL MEDICINE

## 2020-08-11 RX ORDER — IRBESARTAN 150 MG/1
TABLET ORAL
Qty: 180 TABLET | Refills: 3 | Status: ON HOLD | OUTPATIENT
Start: 2020-08-11 | End: 2021-02-03

## 2020-08-11 RX ORDER — SIMVASTATIN 10 MG
TABLET ORAL
Qty: 90 TABLET | Refills: 4 | Status: SHIPPED | OUTPATIENT
Start: 2020-08-11 | End: 2021-05-10

## 2020-08-11 RX ORDER — OMEGA-3-ACID ETHYL ESTERS 1 G/1
1 CAPSULE, LIQUID FILLED ORAL DAILY
Qty: 90 CAPSULE | Refills: 3 | Status: SHIPPED | OUTPATIENT
Start: 2020-08-11 | End: 2021-06-22

## 2020-08-11 NOTE — PROGRESS NOTES
Luis Alfredo Cunningham is a 76year old female who presents for a complete physical exam.    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 She has hypertension, hyperlipidemia, obesity, vitamin D deficiency, osteoarthritis. She adheres to low-salt, low-fat diet.   Eats vege (1 g total) by mouth daily. 90 capsule 3   • simvastatin 10 MG Oral Tab TAKE ONE TABLET BY MOUTH EVERY DAY IN THE EVENING 90 tablet 4   • Misc Natural Products (FLEX-A-MIN JOINT FLEX OR) Take by mouth.  Takes two tablets daily     • Vitamin D3 (VITAMIN D3) low carb diet     REVIEW OF SYSTEMS:   GENERAL: feels well otherwise  SKIN: denies any unusual skin lesions  EYES:denies blurred vision or double vision  HEENT: denies nasal congestion, sinus pain   LUNGS: denies shortness of breath with exertion  CARDIOVA Will taper to half tablet every 7 days and gradually DC. Order put in for mammogram and dexascan. Self breast exam explained. Health maintenance, will check fasting Lipids, CMP, and CBC. Colonoscopy benign on 7/9/2015. Repeat on 7/9/2025.

## 2020-08-11 NOTE — PROGRESS NOTES
Pt presented to clinic today for blood draw. Per physician able to draw orders. Orders  documented within chart. Pt tolerated lab draw well.  verified.   Orders drawn include: cbc, cmp, lipid, vit d  Site of draw: rt savannah Delgado CMA

## 2020-08-12 LAB — 25(OH)D3 SERPL-MCNC: 57.8 NG/ML (ref 30–100)

## 2020-08-12 NOTE — PATIENT INSTRUCTIONS
Eating Heart-Healthy Food: Using the [address] for your heart doesn’t have to be hard or boring. You just need to know how to make healthier choices. The DASH eating plan has been developed to help you do just that.  DASH stands for Dietary Appr Best choices: Skim or 1% milk, low-fat or fat-free yogurt or buttermilk, and low-fat cheeses.         Lean meats, poultry, fish  Servings: 6 or fewer a day  A serving is:  · 1 ounce cooked meats, poultry, or fish  · 1 egg  Best choices: Lean poultry and fi DASH eating plan Adopt a diet rich in fruits, vegetables, and low fat dairy products with reduced content of saturated and total fat.  8-14 mmHg   Dietary sodium reduction Reduce dietary sodium intake to <= 100 mmol per day (2.4 g sodium or 6 g sodium chlor

## 2020-09-17 ENCOUNTER — HOSPITAL ENCOUNTER (OUTPATIENT)
Dept: MAMMOGRAPHY | Facility: HOSPITAL | Age: 75
Discharge: HOME | End: 2020-09-17
Attending: INTERNAL MEDICINE
Payer: COMMERCIAL

## 2020-09-17 ENCOUNTER — HOSPITAL ENCOUNTER (OUTPATIENT)
Dept: BONE DENSITY | Facility: HOSPITAL | Age: 75
Discharge: HOME | End: 2020-09-17
Attending: INTERNAL MEDICINE
Payer: COMMERCIAL

## 2020-09-17 DIAGNOSIS — Z12.31 VISIT FOR SCREENING MAMMOGRAM: ICD-10-CM

## 2020-09-17 DIAGNOSIS — Z78.0 POSTMENOPAUSAL: ICD-10-CM

## 2020-09-17 PROCEDURE — 77080 DXA BONE DENSITY AXIAL: CPT | Performed by: INTERNAL MEDICINE

## 2020-09-17 PROCEDURE — 77063 BREAST TOMOSYNTHESIS BI: CPT | Performed by: INTERNAL MEDICINE

## 2020-09-17 PROCEDURE — 77067 SCR MAMMO BI INCL CAD: CPT | Performed by: INTERNAL MEDICINE

## 2020-12-15 NOTE — PROGRESS NOTES
Patient discharged to home per MD orders. Discharge instructions reviewed with patient. Questions encouraged and answered. Patient verbalizes understanding. Patient escorted by [facility] staff to private vehicle. Stable at discharge. Woodrow Mcburney is a 76year old female. HPI:     HPI     Pt is here for a complete exam and photos. Pt states vision is stable. Pt would like an updated glasses Rx today.      Last edited by Michael Brower OT on 11/21/2019  3:13 PM. (History) tab  tablet 4   • simvastatin 10 MG Oral Tab TAKE ONE TABLET BY MOUTH EVERY DAY IN THE EVENING 90 tablet 4   • Misc Natural Products (FLEX-A-MIN JOINT FLEX OR) Take by mouth.  Takes two tablets daily     • Vitamin D3 (VITAMIN D3) 2000 UNITS Oral Cap Sphere Cylinder [place] Add [place]    Right -1.00 +0.75 115 20/20 +2.50 20/20    Left -1.25 +0.75 105 20/20 +2.50 20/20          Final Rx       Sphere Cylinder [place] Add [place]    Right -1.00 +0.75 115 20/20 +2.50 20/20    Left -1.25 +0.

## 2021-01-01 ENCOUNTER — TELEPHONE (OUTPATIENT)
Dept: HEMATOLOGY/ONCOLOGY | Facility: HOSPITAL | Age: 76
End: 2021-01-01

## 2021-01-01 DIAGNOSIS — G89.3 CANCER RELATED PAIN: ICD-10-CM

## 2021-01-01 DIAGNOSIS — C79.51 METASTASIS TO VERTEBRAL COLUMN OF UNKNOWN ORIGIN (HCC): ICD-10-CM

## 2021-01-01 DIAGNOSIS — G47.9 DIFFICULTY SLEEPING: ICD-10-CM

## 2021-01-01 DIAGNOSIS — C80.1 METASTASIS TO VERTEBRAL COLUMN OF UNKNOWN ORIGIN (HCC): ICD-10-CM

## 2021-01-01 RX ORDER — MIRTAZAPINE 30 MG/1
TABLET, FILM COATED ORAL
Qty: 30 TABLET | Refills: 1 | Status: SHIPPED | OUTPATIENT
Start: 2021-01-01 | End: 2021-01-01

## 2021-01-01 RX ORDER — FENTANYL 75 UG/1
1 PATCH TRANSDERMAL
Qty: 10 PATCH | Refills: 0 | Status: CANCELLED | OUTPATIENT
Start: 2021-01-01

## 2021-01-01 RX ORDER — OSIMERTINIB 80 1/1
2 TABLET, FILM COATED ORAL DAILY
Qty: 60 TABLET | Refills: 11 | OUTPATIENT
Start: 2021-01-01

## 2021-01-01 RX ORDER — FENTANYL 75 UG/H
1 PATCH TRANSDERMAL
Qty: 10 PATCH | Refills: 0 | OUTPATIENT
Start: 2021-01-01

## 2021-01-22 ENCOUNTER — PATIENT MESSAGE (OUTPATIENT)
Dept: INTERNAL MEDICINE CLINIC | Facility: CLINIC | Age: 76
End: 2021-01-22

## 2021-01-25 NOTE — TELEPHONE ENCOUNTER
From: Gretta Chery  To: Marco Antonio Sosa MD  Sent: 1/22/2021 4:01 PM CST  Subject: Other    I hope this message find you well . Please let me know when do I receive my appointment for the [facility]  vaccination.  As you know I do have underlining conditio

## 2021-01-30 ENCOUNTER — HOSPITAL ENCOUNTER (INPATIENT)
Facility: HOSPITAL | Age: 76
LOS: 2 days | Discharge: HOME | DRG: 181 | End: 2021-02-03
Attending: EMERGENCY MEDICINE | Admitting: HOSPITALIST
Payer: MEDICARE

## 2021-01-30 ENCOUNTER — APPOINTMENT (OUTPATIENT)
Dept: CT IMAGING | Facility: HOSPITAL | Age: 76
DRG: 181 | End: 2021-01-30
Attending: EMERGENCY MEDICINE
Payer: MEDICARE

## 2021-01-30 DIAGNOSIS — C79.31 BRAIN METASTASIS (HCC): ICD-10-CM

## 2021-01-30 DIAGNOSIS — M54.16 LUMBAR RADICULOPATHY: ICD-10-CM

## 2021-01-30 DIAGNOSIS — R91.8 MASS OF LEFT LUNG: Primary | ICD-10-CM

## 2021-01-30 PROBLEM — E87.1 HYPONATREMIA: Status: ACTIVE | Noted: 2021-01-30

## 2021-01-30 PROBLEM — R73.9 HYPERGLYCEMIA: Status: ACTIVE | Noted: 2021-01-30

## 2021-01-30 LAB
ANION GAP SERPL CALC-SCNC: 6 MMOL/L (ref 0–18)
BASOPHILS # BLD AUTO: 0.03 X10(3) UL (ref 0–0.2)
BASOPHILS NFR BLD AUTO: 0.3 %
BILIRUB UR QL: NEGATIVE
BUN BLD-MCNC: 11 MG/DL (ref 7–18)
BUN/CREAT SERPL: 13.6 (ref 10–20)
CALCIUM BLD-MCNC: 10.7 MG/DL (ref 8.5–10.1)
CHLORIDE SERPL-SCNC: 97 MMOL/L (ref 98–112)
CLARITY UR: CLEAR
CO2 SERPL-SCNC: 28 MMOL/L (ref 21–32)
COLOR UR: COLORLESS
CREAT BLD-MCNC: 0.81 MG/DL
DEPRECATED RDW RBC AUTO: 39.9 FL (ref 35.1–46.3)
EOSINOPHIL # BLD AUTO: 0.12 X10(3) UL (ref 0–0.7)
EOSINOPHIL NFR BLD AUTO: 1.2 %
ERYTHROCYTE [DISTWIDTH] IN BLOOD BY AUTOMATED COUNT: 13.7 % (ref 11–15)
GLUCOSE BLD-MCNC: 125 MG/DL (ref 70–99)
GLUCOSE UR-MCNC: NEGATIVE MG/DL
HCT VFR BLD AUTO: 39.2 %
HGB BLD-MCNC: 12.6 G/DL
HYALINE CASTS #/AREA URNS AUTO: 1 /LPF
IMM GRANULOCYTES # BLD AUTO: 0.04 X10(3) UL (ref 0–1)
IMM GRANULOCYTES NFR BLD: 0.4 %
KETONES UR-MCNC: NEGATIVE MG/DL
LYMPHOCYTES # BLD AUTO: 1.4 X10(3) UL (ref 1–4)
LYMPHOCYTES NFR BLD AUTO: 14.1 %
MCH RBC QN AUTO: 25.8 PG (ref 26–34)
MCHC RBC AUTO-ENTMCNC: 32.1 G/DL (ref 31–37)
MCV RBC AUTO: 80.3 FL
MONOCYTES # BLD AUTO: 0.59 X10(3) UL (ref 0.1–1)
MONOCYTES NFR BLD AUTO: 5.9 %
NEUTROPHILS # BLD AUTO: 7.77 X10 (3) UL (ref 1.5–7.7)
NEUTROPHILS # BLD AUTO: 7.77 X10(3) UL (ref 1.5–7.7)
NEUTROPHILS NFR BLD AUTO: 78.1 %
NITRITE UR QL STRIP.AUTO: NEGATIVE
OSMOLALITY SERPL CALC.SUM OF ELEC: 273 MOSM/KG (ref 275–295)
PH UR: 7 [PH] (ref 5–8)
PLATELET # BLD AUTO: 251 10(3)UL (ref 150–450)
POTASSIUM SERPL-SCNC: 4.7 MMOL/L (ref 3.5–5.1)
PROT UR-MCNC: NEGATIVE MG/DL
RBC # BLD AUTO: 4.88 X10(6)UL
RBC #/AREA URNS AUTO: 2 /HPF
SARS-COV-2 RNA RESP QL NAA+PROBE: NOT DETECTED
SODIUM SERPL-SCNC: 131 MMOL/L (ref 136–145)
SP GR UR STRIP: 1.01 (ref 1–1.03)
TROPONIN I SERPL-MCNC: <0.045 NG/ML (ref ?–0.04)
UROBILINOGEN UR STRIP-ACNC: <2
WBC # BLD AUTO: 10 X10(3) UL (ref 4–11)
WBC #/AREA URNS AUTO: 1 /HPF

## 2021-01-30 PROCEDURE — 71260 CT THORAX DX C+: CPT | Performed by: EMERGENCY MEDICINE

## 2021-01-30 PROCEDURE — 74177 CT ABD & PELVIS W/CONTRAST: CPT | Performed by: EMERGENCY MEDICINE

## 2021-01-30 RX ORDER — MORPHINE SULFATE 2 MG/ML
2 INJECTION, SOLUTION INTRAMUSCULAR; INTRAVENOUS EVERY 4 HOURS PRN
Status: DISCONTINUED | OUTPATIENT
Start: 2021-01-30 | End: 2021-02-03

## 2021-01-30 RX ORDER — HYDROMORPHONE HYDROCHLORIDE 1 MG/ML
0.3 INJECTION, SOLUTION INTRAMUSCULAR; INTRAVENOUS; SUBCUTANEOUS
Status: DISCONTINUED | OUTPATIENT
Start: 2021-01-30 | End: 2021-01-30

## 2021-01-30 RX ORDER — HEPARIN SODIUM 5000 [USP'U]/ML
5000 INJECTION, SOLUTION INTRAVENOUS; SUBCUTANEOUS ONCE
Status: COMPLETED | OUTPATIENT
Start: 2021-01-30 | End: 2021-01-30

## 2021-01-30 RX ORDER — ONDANSETRON 2 MG/ML
4 INJECTION INTRAMUSCULAR; INTRAVENOUS EVERY 4 HOURS PRN
Status: DISCONTINUED | OUTPATIENT
Start: 2021-01-30 | End: 2021-02-03

## 2021-01-30 RX ORDER — DIAZEPAM 5 MG/ML
2.5 INJECTION, SOLUTION INTRAMUSCULAR; INTRAVENOUS ONCE
Status: COMPLETED | OUTPATIENT
Start: 2021-01-30 | End: 2021-01-30

## 2021-01-30 RX ORDER — MORPHINE SULFATE 4 MG/ML
2 INJECTION, SOLUTION INTRAMUSCULAR; INTRAVENOUS ONCE
Status: COMPLETED | OUTPATIENT
Start: 2021-01-30 | End: 2021-01-30

## 2021-01-30 RX ORDER — LABETALOL HYDROCHLORIDE 5 MG/ML
20 INJECTION, SOLUTION INTRAVENOUS ONCE
Status: DISCONTINUED | OUTPATIENT
Start: 2021-01-30 | End: 2021-02-03

## 2021-01-30 RX ORDER — ONDANSETRON 2 MG/ML
4 INJECTION INTRAMUSCULAR; INTRAVENOUS EVERY 6 HOURS PRN
Status: DISCONTINUED | OUTPATIENT
Start: 2021-01-30 | End: 2021-01-30

## 2021-01-30 RX ORDER — MORPHINE SULFATE 2 MG/ML
4 INJECTION, SOLUTION INTRAMUSCULAR; INTRAVENOUS EVERY 4 HOURS PRN
Status: DISCONTINUED | OUTPATIENT
Start: 2021-01-30 | End: 2021-02-03

## 2021-01-30 NOTE — ED INITIAL ASSESSMENT (HPI)
atraumatic lower back pain.  Denies dysuria       Also c/o htn sbp 200    Also now c/o hear palpatations

## 2021-01-31 LAB
APTT PPP: 25.8 SECONDS (ref 23.2–35.3)
INR BLD: 0.99 (ref 0.9–1.2)
PROTHROMBIN TIME: 12.9 SECONDS (ref 11.8–14.5)

## 2021-01-31 PROCEDURE — 99215 OFFICE O/P EST HI 40 MIN: CPT | Performed by: INTERNAL MEDICINE

## 2021-01-31 PROCEDURE — 99223 1ST HOSP IP/OBS HIGH 75: CPT | Performed by: HOSPITALIST

## 2021-01-31 RX ORDER — HEPARIN SODIUM 5000 [USP'U]/ML
5000 INJECTION, SOLUTION INTRAVENOUS; SUBCUTANEOUS 2 TIMES DAILY
Status: DISCONTINUED | OUTPATIENT
Start: 2021-01-31 | End: 2021-02-01

## 2021-01-31 RX ORDER — METOCLOPRAMIDE HYDROCHLORIDE 5 MG/ML
10 INJECTION INTRAMUSCULAR; INTRAVENOUS EVERY 6 HOURS PRN
Status: DISCONTINUED | OUTPATIENT
Start: 2021-01-31 | End: 2021-02-03

## 2021-01-31 RX ORDER — ACETAMINOPHEN AND CODEINE PHOSPHATE 300; 30 MG/1; MG/1
1 TABLET ORAL EVERY 4 HOURS PRN
Status: DISCONTINUED | OUTPATIENT
Start: 2021-01-31 | End: 2021-02-01

## 2021-01-31 RX ORDER — ASPIRIN 81 MG/1
81 TABLET ORAL DAILY
Status: DISCONTINUED | OUTPATIENT
Start: 2021-01-31 | End: 2021-02-01

## 2021-01-31 RX ORDER — PRAVASTATIN SODIUM 20 MG
20 TABLET ORAL NIGHTLY
Refills: 4 | Status: DISCONTINUED | OUTPATIENT
Start: 2021-01-31 | End: 2021-02-03

## 2021-01-31 RX ORDER — LOSARTAN POTASSIUM 50 MG/1
50 TABLET ORAL DAILY
Refills: 3 | Status: DISCONTINUED | OUTPATIENT
Start: 2021-01-31 | End: 2021-02-03

## 2021-01-31 NOTE — ED NOTES
Pt already reports significant improvement in pain following meds, resting in bed comfortably, previously sts she was not able to stay in bed and had to stay standing and \"hitting my back\" for some comfort. Pt instructed to stay in bed now d/t meds.     L

## 2021-01-31 NOTE — H&P
[facility]    PATIENT'S NAME: Doroteo BRUNNER Fasor 69.   ATTENDING PHYSICIAN: Zeb Willard DO   PATIENT ACCOUNT#:   [de-identified]    LOCATION:  [address] [place] [address] Rd #:   [id number]       YOB: 1945  ADMISSION DATE:       01 mass lesion. There is also some prominence of left hilum suggesting adenopathy, atelectasis or pneumonia or possibility malignancy was a larger concern and a PET scan and biopsy was recommended.   The scan also revealed diffuse fatty infiltration of the li . REVIEW OF SYSTEMS:  Twelve systems are reviewed. The patient did have some nausea and emesis, but that was after receiving IV pain medications. She denied any urinary symptoms. No diarrhea, melena, or hematochezia.   There are otherwise no ad MRI, would consult Pain Service if appropriate. 2.   Lung mass concerning for bronchogenic carcinoma. We will await patient's continued response to pain management, try oral Norco for pain as well. The patient is anxious for discharge.   3.   Low back pa

## 2021-01-31 NOTE — ED NOTES
Orders for admission, patient is aware of plan and ready to go upstairs. Any questions, please call ED DELONTE Bergman  at extension [phone].      Type of COVID test sent: Rapid  COVID Suspicion level: low  Drug(s) infusing: n/a  LOC at time of transport: a/ox4,

## 2021-01-31 NOTE — PROGRESS NOTES
ADMISSION NOTE    76year old female nonsmoker with h/o HTN and chronic intermittent mild low back pain presents with 3 week c/o gradually escalating low back pain with radicular symptoms over the past 2 days. Aslo c/o HTN and palpitations.   Work up [employer]

## 2021-01-31 NOTE — PLAN OF CARE
Pt alert and oriented x4. VSS. On room air. Tolerating diet, 1x emesis, 1x zofran given. No further nausea. Voiding in toilet. Tylenol #3 for pain control. Per pt, numbness and tingling in BLE \"better\". Pending MRI. Ambulating with steady gait.  Up in ron Consider cultural and social influences on pain and pain management  - Manage/alleviate anxiety  - Utilize distraction and/or relaxation techniques  - Monitor for opioid side effects  - Notify MD/LIP if interventions unsuccessful or patient reports new saad measures as appropriate  - Advance diet as tolerated, if ordered  - Obtain nutritional consult as needed  - Evaluate fluid balance  Outcome: Progressing  Goal: Maintains adequate nutritional intake (undernourished)  Description: INTERVENTIONS:  - Monitor p spinal or hip dislocation precautions)  Outcome: Progressing

## 2021-01-31 NOTE — CONSULTS
[facility] - [facility]    Consult Note    Date:  1/31/2021  Date of Admission:  1/30/2021      Chief Complaint:   Lenita Gowers is a(n) 76year old female with lung mass. HPI:   The patient is a lifetime non-smoker.   There is a family history EYE Right 6/6/15    RJM   • YAG CAPSULOTOMY - OS - LEFT EYE Left 06/16/2015    RJM     Family History   Problem Relation Age of Onset   • Hypertension Mother    • Lipids Mother         hyperlipidemia   • Glaucoma Mother    • Hypertension Father    • Heart pressure 122/62, pulse 67, temperature 98.9 °F (37.2 °C), temperature source Oral, resp. rate 16, height 5' (1.524 m), weight 144 lb (65.3 kg), SpO2 94 %, not currently breastfeeding.      Alert  female  HEENT examination is unremarkable with pupils eq to assist with Pat's care. I have spoken to the . Son did not  the phone. Left message to call back.       Becky Lehman MD  Medical Director, Critical Care, [facility]  Medical Director, [address] [place] [address]. [phone]  Pager: [phone]

## 2021-01-31 NOTE — PLAN OF CARE
Admission from ED with back pain radiating to bilateral lower extremities. A&O x4. VSS; pain treated with prn morphine. Vomiting x3 post dilaudid administration; prn zofran given and tolerated well. Remote tele without calls. Up with SBA.  Voiding freely in assistance  - Assess pain using appropriate pain scale  - Administer analgesics based on type and severity of pain and evaluate response  - Implement non-pharmacological measures as appropriate and evaluate response  - Consider cultural and social influenc Maintain adequate hydration with IV or PO as ordered and tolerated  - Nasogastric tube to low intermittent suction as ordered  - Evaluate effectiveness of ordered antiemetic medications  - Provide nonpharmacologic comfort measures as appropriate  - Advance affected body part  Description: INTERVENTIONS:  - Support and protect limb and body alignment per provider's orders  - Instruct and reinforce with patient and family use of appropriate assistive device and precautions (e.g. spinal or hip dislocation preca

## 2021-01-31 NOTE — ED PROVIDER NOTES
Patient Seen in: [facility] AND [facility] Emergency Department      History   Patient presents with:  Arrythmia/Palpitations  Hypertension  Back Pain    Stated Complaint: lower back pain x3 wks    HPI/Subjective:   HPI    Patient presents the emergency departme Drug use: Not on file             Review of Systems    Positive for stated complaint: lower back pain x3 wks  Other systems are as noted in HPI. Constitutional and vital signs reviewed. All other systems reviewed and negative except as noted above. deficit.                ED Course     Labs Reviewed   URINALYSIS WITH CULTURE REFLEX - Abnormal; Notable for the following components:       Result Value    Urine Color Colorless (*)     Blood Urine Small (*)     Leukocyte Esterase Urine Small (*)     Bacte descending thoracic aorta and the left pulmonary artery. This is less likely an area of atelectasis or pneumonia. Correlate with PET scanning and biopsy. Prominence of the left hilum may suggest adenopathy. Prominent lymph node in the AP window.  2. No (Principal) Lumbar radiculopathy M54.16 1/30/2021 Unknown    Hyperglycemia R73.9 1/30/2021 Yes    Hyponatremia E87.1 1/30/2021 Yes    Mass of left lung R91.8 1/30/2021

## 2021-02-01 ENCOUNTER — APPOINTMENT (OUTPATIENT)
Dept: MRI IMAGING | Facility: HOSPITAL | Age: 76
DRG: 181 | End: 2021-02-01
Attending: INTERNAL MEDICINE
Payer: MEDICARE

## 2021-02-01 ENCOUNTER — APPOINTMENT (OUTPATIENT)
Dept: MRI IMAGING | Facility: HOSPITAL | Age: 76
DRG: 181 | End: 2021-02-01
Attending: HOSPITALIST
Payer: MEDICARE

## 2021-02-01 PROBLEM — M48.50XA: Status: ACTIVE | Noted: 2021-02-01

## 2021-02-01 PROBLEM — E83.52 HYPERCALCEMIA: Status: ACTIVE | Noted: 2021-02-01

## 2021-02-01 PROBLEM — E27.8 ADRENAL MASS, LEFT (HCC): Status: ACTIVE | Noted: 2021-02-01

## 2021-02-01 PROCEDURE — 99233 SBSQ HOSP IP/OBS HIGH 50: CPT | Performed by: HOSPITALIST

## 2021-02-01 PROCEDURE — 72148 MRI LUMBAR SPINE W/O DYE: CPT | Performed by: HOSPITALIST

## 2021-02-01 PROCEDURE — 99232 SBSQ HOSP IP/OBS MODERATE 35: CPT | Performed by: INTERNAL MEDICINE

## 2021-02-01 PROCEDURE — 99254 IP/OBS CNSLTJ NEW/EST MOD 60: CPT | Performed by: INTERNAL MEDICINE

## 2021-02-01 PROCEDURE — 70553 MRI BRAIN STEM W/O & W/DYE: CPT | Performed by: INTERNAL MEDICINE

## 2021-02-01 RX ORDER — DEXTROSE AND SODIUM CHLORIDE 5; .9 G/100ML; G/100ML
INJECTION, SOLUTION INTRAVENOUS CONTINUOUS
Status: DISCONTINUED | OUTPATIENT
Start: 2021-02-02 | End: 2021-02-02

## 2021-02-01 RX ORDER — ACETAMINOPHEN AND CODEINE PHOSPHATE 300; 30 MG/1; MG/1
2 TABLET ORAL EVERY 6 HOURS PRN
Status: DISCONTINUED | OUTPATIENT
Start: 2021-02-01 | End: 2021-02-02

## 2021-02-01 NOTE — PLAN OF CARE
Pt alert and oriented x4. Forgetful at times. VSS. On room air. Tolerating diet, no nausea. Voiding in toilet. Tylenol #3 + hot packs for pain control. Per pt, numbness and tingling in BLE \"better\". MRI done. Ambulating with steady gait. Up in chair.  Mark analgesics based on type and severity of pain and evaluate response  - Implement non-pharmacological measures as appropriate and evaluate response  - Consider cultural and social influences on pain and pain management  - Manage/alleviate anxiety  - Utilize Nasogastric tube to low intermittent suction as ordered  - Evaluate effectiveness of ordered antiemetic medications  - Provide nonpharmacologic comfort measures as appropriate  - Advance diet as tolerated, if ordered  - Obtain nutritional consult as needed and body alignment per provider's orders  - Instruct and reinforce with patient and family use of appropriate assistive device and precautions (e.g. spinal or hip dislocation precautions)  Outcome: Progressing

## 2021-02-01 NOTE — CONSULTS
[facility]OSP - [facility]    Report of Hematology/Oncology Consultation    Clarissa Pina Patient Status:  Inpatient    1945 MRN [id number]   Location 465/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 She retired approximately 10 to 12 years ago. She states that she had had chronic intermittent lower back pain for years.   She states that it was more pronounced when she has to move patients at the While at the emergency department she had a CT scan of the chest, abdomen and pelvis which was negative for pulmonary embolus. It did reveal a 5 x 4.1 x 4.6 cm lesion at the superior segment of the left lower lobe in the posterior region.   This was extend An MRI of the lumbar spine was obtained today for further evaluation of the lower back pain. This showed an osseous metastases involving L1 vertebral body with pathologic fracture extending into the L1 pedicle.   There is no significant vertebral body heig •  Irbesartan 150 MG Oral Tab, TAKE 1 TABLET TWICE A DAY, Disp: 180 tablet, Rfl: 3    •  Omega-3-acid Ethyl Esters (LOVAZA) 1 g Oral Cap, Take 1 capsule (1 g total) by mouth daily. , Disp: 90 capsule, Rfl: 3    •  Estrogens Conjugated (PREMARIN) 0.3 MG Oral Eyes: Negative for eye problems. Respiratory: Positive for cough. Negative for shortness of breath. Cardiovascular: Negative for chest pain. Gastrointestinal: Positive for nausea and vomiting. Negative for abdominal pain, constipation and diarrhea. 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 No significant vertebral body height loss.   There is slight osseous retropulsion of the left posterior superior aspect of the L --Pulmonary and interventional radiology have been consulted. --Recommend CT-guided biopsy of the mass. --Given that the patient has evidence of metastatic disease.   Recommend for biopsy to be sent for PD-L1, EGFR, ALK, ROS–1, BRAF, RET, and MET.  --Give --This was mild, however there is no albumin to calculate the corrected calcium level. --We will order a CMP in the morning for further evaluation, to determine if patient would benefit from IV fluid hydration.   She is currently maintaining adequate oral

## 2021-02-01 NOTE — CM/SW NOTE
Per patient request, met with her and  at bedside. Patient reports her primary insurance is [employer] FEP PPO, which is confirmed on patient's facesheet.  She also provided copy of Medicare card w/ Part A listed (ID [id number]), notified admitting team

## 2021-02-01 NOTE — PROGRESS NOTES
[facility]    Progress Note    Bill Course German Maradiaga Patient Status:  Observation    1945 MRN [id number]   Location [facility] 4W/SW/SE Attending Joel Naranjo MD   Hosp Day # 0 PCP Rina Robb MD       Subjective:   Kareen Jean-Baptiste 06/28/2016    TROP <0.045 01/30/2021       Ct Chest+abdomen+pelvis(all Cntrst Only)(cpt=71260/37415)    Result Date: 1/30/2021  CONCLUSION:  1.  There is a large 5.0 x 4.1 x 4.6 cm area of consolidation in the superior segment of the left lower lobe worriso evaluation    Mass of left lung  -likely bronchogenic carcinoma, plan IR biopsy of L1 lesion    Hypertension  -Secondary to pain    Discussed with patient,  at bedsie, son         Greater than 35 minutes spent, >50% spent counseling re: treatment pl

## 2021-02-01 NOTE — PLAN OF CARE
No acute medical changes during shift. A&O x4. Pain treated with prn tylenol #3 and morphine. Remote tele. Up with SBA. Voiding freely in bathroom and tolerating a cardiac diet with better appetite; denies nausea.  Plan is MRI this AM and discussed plan of response  - Implement non-pharmacological measures as appropriate and evaluate response  - Consider cultural and social influences on pain and pain management  - Manage/alleviate anxiety  - Utilize distraction and/or relaxation techniques  - Monitor for op Evaluate effectiveness of ordered antiemetic medications  - Provide nonpharmacologic comfort measures as appropriate  - Advance diet as tolerated, if ordered  - Obtain nutritional consult as needed  - Evaluate fluid balance  Outcome: Progressing  Goal: Ольга Minor reinforce with patient and family use of appropriate assistive device and precautions (e.g. spinal or hip dislocation precautions)  Outcome: Progressing

## 2021-02-01 NOTE — PROGRESS NOTES
[facility]    Progress Note      Assessment and Plan:   1. Abnormal CT scan of the chest–MRI of the lumbar spine suggests metastatic lesion.     Recommendations:  1. Outpatient PET scan  2. IR biopsy of vertebral lesion  3.   Neurosurgica Director, [address], [facility] Director, [facility]  Pager: [phone]

## 2021-02-02 ENCOUNTER — APPOINTMENT (OUTPATIENT)
Dept: GENERAL RADIOLOGY | Facility: HOSPITAL | Age: 76
DRG: 181 | End: 2021-02-02
Attending: RADIOLOGY
Payer: MEDICARE

## 2021-02-02 ENCOUNTER — OFFICE VISIT (OUTPATIENT)
Dept: RADIATION ONCOLOGY | Facility: HOSPITAL | Age: 76
DRG: 181 | End: 2021-02-02
Attending: RADIOLOGY
Payer: MEDICARE

## 2021-02-02 ENCOUNTER — APPOINTMENT (OUTPATIENT)
Dept: CT IMAGING | Facility: HOSPITAL | Age: 76
DRG: 181 | End: 2021-02-02
Attending: INTERNAL MEDICINE
Payer: MEDICARE

## 2021-02-02 LAB
ALBUMIN SERPL-MCNC: 3.6 G/DL (ref 3.4–5)
ALBUMIN/GLOB SERPL: 1 {RATIO} (ref 1–2)
ALP LIVER SERPL-CCNC: 90 U/L
ALT SERPL-CCNC: 20 U/L
ANION GAP SERPL CALC-SCNC: 2 MMOL/L (ref 0–18)
AST SERPL-CCNC: 9 U/L (ref 15–37)
BASOPHILS # BLD AUTO: 0.05 X10(3) UL (ref 0–0.2)
BASOPHILS NFR BLD AUTO: 0.7 %
BILIRUB DIRECT SERPL-MCNC: <0.1 MG/DL (ref 0–0.2)
BILIRUB SERPL-MCNC: 0.4 MG/DL (ref 0.1–2)
BUN BLD-MCNC: 11 MG/DL (ref 7–18)
BUN/CREAT SERPL: 15.5 (ref 10–20)
CALCIUM BLD-MCNC: 9.4 MG/DL (ref 8.5–10.1)
CHLORIDE SERPL-SCNC: 104 MMOL/L (ref 98–112)
CO2 SERPL-SCNC: 31 MMOL/L (ref 21–32)
CREAT BLD-MCNC: 0.71 MG/DL
DEPRECATED RDW RBC AUTO: 40 FL (ref 35.1–46.3)
EOSINOPHIL # BLD AUTO: 0.31 X10(3) UL (ref 0–0.7)
EOSINOPHIL NFR BLD AUTO: 4.1 %
ERYTHROCYTE [DISTWIDTH] IN BLOOD BY AUTOMATED COUNT: 13.6 % (ref 11–15)
GLOBULIN PLAS-MCNC: 3.7 G/DL (ref 2.8–4.4)
GLUCOSE BLD-MCNC: 118 MG/DL (ref 70–99)
HAV IGM SER QL: 2.3 MG/DL (ref 1.6–2.6)
HCT VFR BLD AUTO: 35 %
HGB BLD-MCNC: 11.3 G/DL
IMM GRANULOCYTES # BLD AUTO: 0.03 X10(3) UL (ref 0–1)
IMM GRANULOCYTES NFR BLD: 0.4 %
LYMPHOCYTES # BLD AUTO: 1.62 X10(3) UL (ref 1–4)
LYMPHOCYTES NFR BLD AUTO: 21.6 %
M PROTEIN MFR SERPL ELPH: 7.3 G/DL (ref 6.4–8.2)
MCH RBC QN AUTO: 26.1 PG (ref 26–34)
MCHC RBC AUTO-ENTMCNC: 32.3 G/DL (ref 31–37)
MCV RBC AUTO: 80.8 FL
MONOCYTES # BLD AUTO: 0.83 X10(3) UL (ref 0.1–1)
MONOCYTES NFR BLD AUTO: 11.1 %
NEUTROPHILS # BLD AUTO: 4.65 X10 (3) UL (ref 1.5–7.7)
NEUTROPHILS # BLD AUTO: 4.65 X10(3) UL (ref 1.5–7.7)
NEUTROPHILS NFR BLD AUTO: 62.1 %
OSMOLALITY SERPL CALC.SUM OF ELEC: 284 MOSM/KG (ref 275–295)
PLATELET # BLD AUTO: 240 10(3)UL (ref 150–450)
POTASSIUM SERPL-SCNC: 5 MMOL/L (ref 3.5–5.1)
RBC # BLD AUTO: 4.33 X10(6)UL
SODIUM SERPL-SCNC: 137 MMOL/L (ref 136–145)
WBC # BLD AUTO: 7.5 X10(3) UL (ref 4–11)

## 2021-02-02 PROCEDURE — 71045 X-RAY EXAM CHEST 1 VIEW: CPT | Performed by: RADIOLOGY

## 2021-02-02 PROCEDURE — 32408 CORE NDL BX LNG/MED PERQ: CPT | Performed by: INTERNAL MEDICINE

## 2021-02-02 PROCEDURE — 0BBJ3ZX EXCISION OF LEFT LOWER LUNG LOBE, PERCUTANEOUS APPROACH, DIAGNOSTIC: ICD-10-PCS | Performed by: RADIOLOGY

## 2021-02-02 PROCEDURE — 99233 SBSQ HOSP IP/OBS HIGH 50: CPT | Performed by: INTERNAL MEDICINE

## 2021-02-02 PROCEDURE — 99233 SBSQ HOSP IP/OBS HIGH 50: CPT | Performed by: HOSPITALIST

## 2021-02-02 PROCEDURE — 99152 MOD SED SAME PHYS/QHP 5/>YRS: CPT | Performed by: INTERNAL MEDICINE

## 2021-02-02 PROCEDURE — 99232 SBSQ HOSP IP/OBS MODERATE 35: CPT | Performed by: INTERNAL MEDICINE

## 2021-02-02 RX ORDER — DEXAMETHASONE 4 MG/1
4 TABLET ORAL EVERY 12 HOURS SCHEDULED
Status: DISCONTINUED | OUTPATIENT
Start: 2021-02-02 | End: 2021-02-03

## 2021-02-02 RX ORDER — ONDANSETRON 4 MG/1
4 TABLET, ORALLY DISINTEGRATING ORAL EVERY 8 HOURS PRN
Qty: 60 TABLET | Refills: 0 | Status: SHIPPED | OUTPATIENT
Start: 2021-02-02 | End: 2021-02-03

## 2021-02-02 RX ORDER — MIDAZOLAM HYDROCHLORIDE 1 MG/ML
1 INJECTION INTRAMUSCULAR; INTRAVENOUS EVERY 5 MIN PRN
Status: DISCONTINUED | OUTPATIENT
Start: 2021-02-02 | End: 2021-02-03

## 2021-02-02 RX ORDER — MORPHINE SULFATE 15 MG/1
15 TABLET ORAL EVERY 4 HOURS PRN
Status: DISCONTINUED | OUTPATIENT
Start: 2021-02-02 | End: 2021-02-03

## 2021-02-02 RX ORDER — MIDAZOLAM HYDROCHLORIDE 1 MG/ML
INJECTION INTRAMUSCULAR; INTRAVENOUS
Status: COMPLETED
Start: 2021-02-02 | End: 2021-02-02

## 2021-02-02 RX ORDER — SENNOSIDES 8.6 MG
8.6 TABLET ORAL 2 TIMES DAILY
Status: DISCONTINUED | OUTPATIENT
Start: 2021-02-02 | End: 2021-02-03

## 2021-02-02 RX ORDER — SENNA PLUS 8.6 MG/1
8.6 TABLET ORAL 2 TIMES DAILY
Qty: 60 TABLET | Refills: 0 | Status: SHIPPED | OUTPATIENT
Start: 2021-02-02 | End: 2021-02-16 | Stop reason: DRUGHIGH

## 2021-02-02 RX ORDER — NALOXONE HYDROCHLORIDE 0.4 MG/ML
80 INJECTION, SOLUTION INTRAMUSCULAR; INTRAVENOUS; SUBCUTANEOUS AS NEEDED
Status: DISCONTINUED | OUTPATIENT
Start: 2021-02-02 | End: 2021-02-03

## 2021-02-02 RX ORDER — FLUMAZENIL 0.1 MG/ML
0.2 INJECTION, SOLUTION INTRAVENOUS AS NEEDED
Status: DISCONTINUED | OUTPATIENT
Start: 2021-02-02 | End: 2021-02-03

## 2021-02-02 RX ORDER — SODIUM CHLORIDE 9 MG/ML
INJECTION, SOLUTION INTRAVENOUS CONTINUOUS
Status: DISCONTINUED | OUTPATIENT
Start: 2021-02-02 | End: 2021-02-02

## 2021-02-02 RX ORDER — MORPHINE SULFATE 15 MG/1
15 TABLET, FILM COATED, EXTENDED RELEASE ORAL EVERY 12 HOURS SCHEDULED
Status: DISCONTINUED | OUTPATIENT
Start: 2021-02-02 | End: 2021-02-02

## 2021-02-02 NOTE — PROGRESS NOTES
1055  Patient arrival to CT room. Dr. Araceli [place] present. Consent reviewed and signed. Patient prone on table. Monitor applied, VSS. IV flushed and patent.  Scouts taken    1110 Time out taken  1111 Chlora prep applied and lidocaine injected    1119 Pathology pre

## 2021-02-02 NOTE — PLAN OF CARE
Problem: Patient Centered Care  Goal: Patient preferences are identified and integrated in the patient's plan of care  Description: Interventions:  - What would you like us to know as we care for you?  I lived in [place] for 50 years and moved to the [facility] appropriate  Outcome: Progressing     Problem: SAFETY ADULT - FALL  Goal: Free from fall injury  Description: INTERVENTIONS:  - Assess pt frequently for physical needs  - Identify cognitive and physical deficits and behaviors that affect risk of falls.   - decreased intake, treat as appropriate  - Assist with meals as needed  - Monitor I&O, WT and lab values  - Obtain nutritional consult as needed  - Optimize oral hygiene and moisture  - Encourage food from home; allow for food preferences  - Enhance eating

## 2021-02-02 NOTE — BRIEF PROCEDURE NOTE
[facility] - [facility]  Procedure Note    Bill Course Dolphus Chin Patient Status:  Inpatient    1945 MRN [id number]   Location [facility] 4W/SW/SE Attending Joel Naranjo MD   Hosp Day # 1 PCP Rina Robb MD     Procedure: CT-guide

## 2021-02-02 NOTE — PLAN OF CARE
Problem: Patient Centered Care  Goal: Patient preferences are identified and integrated in the patient's plan of care  Description: Interventions:  - What would you like us to know as we care for you?  I lived in [place] for 50 years and moved to the [facility] appropriate  Outcome: Progressing     Problem: SAFETY ADULT - FALL  Goal: Free from fall injury  Description: INTERVENTIONS:  - Assess pt frequently for physical needs  - Identify cognitive and physical deficits and behaviors that affect risk of falls.   - decreased intake, treat as appropriate  - Assist with meals as needed  - Monitor I&O, WT and lab values  - Obtain nutritional consult as needed  - Optimize oral hygiene and moisture  - Encourage food from home; allow for food preferences  - Enhance eating measures in place. Frequent rounding being done.

## 2021-02-02 NOTE — PROGRESS NOTES
[facility]    Progress Note      Assessment and Plan:   1. Lung lesion with brain and osseous metastatic involvement –MRI of the lumbar spine suggests metastatic lesion. Also, MRI of the brain suggests multiple subcentimeter metastases. 02/02/2021   MRI of the lumbar spine–Osseous metastasis involving the L1 vertebral body with a pathologic fracture extending into the left L1 pedicle. No significant vertebral body height loss.      There is slight osseous retropulsion of the left posterio

## 2021-02-02 NOTE — CONSULTS
RADIATION ONCOLOGY NOTE    DATE OF VISIT: 2/2/2021    DIAGNOSIS : Likely lung cancer with symptomatic spine and brain metastases biopsy pending      Dear Roberto Chou and colleagues,    As you recall, Tony Valencia is a pleasant 76year old female, 2/1/2021  CONCLUSION:   Osseous metastasis involving the L1 vertebral body with a pathologic fracture extending into the left L1 pedicle. No significant vertebral body height loss.   There is slight osseous retropulsion of the left posterior superior aspec Medication Sig Dispense Refill   • Senna 8.6 MG Oral Tab Take 1 tablet (8.6 mg total) by mouth 2 (two) times daily. 60 tablet 0   • ondansetron 4 MG Oral Tablet Dispersible Take 1 tablet (4 mg total) by mouth every 8 (eight) hours as needed for Nausea.  6 with no cervical, supraclavicular or axillary lymphadenopathy. CHEST:  Clear   ABDOMEN:  Soft with no masses. EXTREMITIES:  There is no upper or lower extremity edema. NEUROLOGIC:  Cranial nerves II-XII are intact.   Neuro exam is nonfocal.    COMPLE injection 0.2 mg, 0.2 mg, Intravenous, PRN    •  Midazolam HCl (VERSED) 2 MG/2ML injection, , ,     •  fentaNYL citrate (SUBLIMAZE) 0.05 MG/ML injection, , ,     •  dextrose 5 % and 0.9 % NaCl infusion, , Intravenous, Continuous    •  losartan Potassium (C mg/dL   CBC W/ DIFFERENTIAL    Collection Time: 02/02/21  6:04 AM   Result Value Ref Range    WBC 7.5 4.0 - 11.0 x10(3) uL    RBC 4.33 3.80 - 5.30 x10(6)uL    HGB 11.3 (L) 12.0 - 16.0 g/dL    HCT 35.0 35.0 - 48.0 %    MCV 80.8 80.0 - 100.0 fL    MCH 26.1 2

## 2021-02-02 NOTE — CONSULTS
[facility]    Neurosurgery Consultation    Trena Doe Patient Status:  Inpatient    1945 MRN [id number]   Location [facility] 4W/SW/SE Attending Diana Ladd MD   Hosp Day # 1 PCP Zena García MD     Date of 06/16/2015    DOMITILA     Family History   Problem Relation Age of Onset   • Hypertension Mother    • Lipids Mother         hyperlipidemia   • Glaucoma Mother    • Hypertension Father    • Heart Disease Father    • Stroke Father 64        (cause of death)   • with no rebound or guarding. No peritoneal signs. Extremities:  Non-tender, no lower extremity edema noted.       Labs:  Lab Results   Component Value Date    WBC 7.5 02/02/2021    HGB 11.3 (L) 02/02/2021    .0 02/02/2021    BUN 11 02/02/2021 which appears to infiltrate into to the space between the descending thoracic aorta and the left pulmonary artery. This is less likely an area of atelectasis or pneumonia. Correlate with PET scanning and biopsy.   Prominence of the left hilum may suggest issues.     David Osborn  2/2/2021  7:25 AM

## 2021-02-02 NOTE — PROGRESS NOTES
[facility]    Progress Note    Ladevinkeven Shira Shelby Patient Status:  Observation    1945 MRN [id number]   Location [facility] 4W/SW/SE Attending Sharlene aRymond MD   Hosp Day # 1 PCP Magaly Garcia MD       Subjective:   Marissa Waters MG 2.3 02/02/2021    TROP <0.045 01/30/2021       Mri Brain (w+wo) (cpt=70553)    Result Date: 2/1/2021  CONCLUSION:  1. Abnormal exam with multiple subcentimeter supra- and infra-tentorial enhancing foci compatible with metastasis.  2.  Cranial bone metas today  -plan NS eval--> angelika eval, no indication for acute intervention  -Pain control--> does not tolerate vicodin, plan start dexamethasone 4mg bid and  msir after procedure   -Plan oncology evaluation--> appreciated  -MRI brain--> shows multiple subcenti

## 2021-02-03 VITALS
WEIGHT: 144 LBS | BODY MASS INDEX: 28.27 KG/M2 | DIASTOLIC BLOOD PRESSURE: 90 MMHG | RESPIRATION RATE: 20 BRPM | SYSTOLIC BLOOD PRESSURE: 151 MMHG | OXYGEN SATURATION: 96 % | HEIGHT: 60 IN | HEART RATE: 73 BPM | TEMPERATURE: 99 F

## 2021-02-03 PROCEDURE — 99239 HOSP IP/OBS DSCHRG MGMT >30: CPT | Performed by: HOSPITALIST

## 2021-02-03 PROCEDURE — 99232 SBSQ HOSP IP/OBS MODERATE 35: CPT | Performed by: INTERNAL MEDICINE

## 2021-02-03 RX ORDER — MORPHINE SULFATE 15 MG/1
15 TABLET ORAL EVERY 4 HOURS PRN
Qty: 30 TABLET | Refills: 0 | Status: SHIPPED | OUTPATIENT
Start: 2021-02-03 | End: 2021-02-16

## 2021-02-03 RX ORDER — LOSARTAN POTASSIUM 50 MG/1
50 TABLET ORAL DAILY
Qty: 30 TABLET | Refills: 3 | Status: ON HOLD | OUTPATIENT
Start: 2021-02-04 | End: 2021-02-27

## 2021-02-03 RX ORDER — DEXAMETHASONE 4 MG/1
4 TABLET ORAL
Qty: 30 TABLET | Refills: 0 | Status: ON HOLD | OUTPATIENT
Start: 2021-02-03 | End: 2021-02-27

## 2021-02-03 RX ORDER — DEXAMETHASONE 4 MG/1
4 TABLET ORAL EVERY 12 HOURS SCHEDULED
Qty: 60 TABLET | Refills: 0 | Status: SHIPPED | OUTPATIENT
Start: 2021-02-03 | End: 2021-02-03

## 2021-02-03 RX ORDER — ONDANSETRON 4 MG/1
4 TABLET, ORALLY DISINTEGRATING ORAL EVERY 8 HOURS PRN
Qty: 60 TABLET | Refills: 0 | Status: SHIPPED | OUTPATIENT
Start: 2021-02-03 | End: 2021-07-27

## 2021-02-03 NOTE — DISCHARGE SUMMARY
[facility]    Discharge Summary    Margarita Stuart Patient Status:  Inpatient    1945 MRN [id number]   Location [facility] 4W/SW/SE Attending Sharath Milner MD   Hosp Day # 2 PCP Brianna Mnadujano MD     Date of Admission: never had radicular symptoms before and the pain had been mild. She has never sought medical attention for it previously.   For the past 3 weeks, the discomfort has been more severe than typically and she has experienced radiculopathies with pain, numbness determine a follow-up plan for her lung mass. The patient is not a smoker. The patient denied any bowel or bladder dysfunction. No saddle anesthesia. No lower extremity weakness. She has been ambulatory since her symptoms began.   She does report that tolerated    Follow up: Follow-up Information     Co, Fei Ortiz MD In 1 week.     Specialty: Internal Medicine  Contact information:  [address] [place] [address] [address] [facility]  [address]             Leisa Worthy MD.    Specialties: [facility] [phone], [phone]  [address] [place] [address]    Phone: [phone]   · dexamethasone 4 MG tablet  · losartan Potassium 50 MG Tabs  · morphINE Sulfate IR 15 MG Tabs  · sennosides 8.6 MG Tabs     You can get these medications from any

## 2021-02-03 NOTE — PHYSICAL THERAPY NOTE
PHYSICAL THERAPY EVALUATION - INPATIENT     Room Number: 465/465-A  Evaluation Date: 2/3/2021  Type of Evaluation: Initial   Physician Order: PT Eval and Treat    Presenting Problem: Lung cancer with mets: Osseous metastasis involving the L1 vertebral bod Recommendations: Home    PLAN  PT Treatment Plan: Body mechanics; Coordination;Gait training;Transfer training;Balance training; Endurance; Energy conservation  Rehab Potential : Fair  Frequency (Obs): 3x/week       PHYSICAL THERAPY MEDICAL/SOCIAL HISTORY mechanics; Relaxation;Breathing techniques    COGNITION  · Overall Cognitive Status:  WFL - within functional limits    RANGE OF MOTION AND STRENGTH ASSESSMENT  Upper extremity ROM and strength are within functional limits    Lower extremity ROM is within f demonstrate transfers Sit to/from Stand at assistance level: independent with none     Goal #2  Current Status    Goal #3 Patient is able to ambulate 500 feet with assist device: none at assistance level: independent   Goal #3   Current Status    Goal #4 P

## 2021-02-03 NOTE — PROGRESS NOTES
[facility]    Progress Note    Lon Whatley Nicky Economy Patient Status:  Inpatient    1945 MRN [id number]   Location [facility] 4W/SW/SE Attending Holly Faustin MD   Hosp Day # 1 PCP Denise Medley MD        Subjective:   Sub --MRI of the brain to rule out metastatic disease c/w metastasis. No significant edema noted. Already on low dose steroids. If patient has an EGFR mutation, osimertinib thus penetrate blood-brain barrier should hopefully treat the disease.   This is the [address]  [phone]       02/02/21             Results:     Lab Results   Component Value Date    WBC 7.5 02/02/2021    HGB 11.3 (L) 02/02/2021    HCT 35.0 02/02/2021    .0 02/02/2021    CREATSERUM 0.71 02/0 Ct Biopsy Lung Or Mediastinum Perc W/img(cpt=32408)    Result Date: 2/2/2021  CONCLUSION:  1.  CT-guided percutaneous core biopsy of mass superior segment left lower lobe     Dictated by (CST): Mahesh Putnam MD on 2/02/2021 at 12:54 PM     Finalized by (C

## 2021-02-03 NOTE — PLAN OF CARE
Problem: Patient Centered Care  Goal: Patient preferences are identified and integrated in the patient's plan of care  Description: Interventions:  - What would you like us to know as we care for you?  I lived in [place] for 50 years and moved to the [facility] appropriate  Outcome: Progressing     Problem: SAFETY ADULT - FALL  Goal: Free from fall injury  Description: INTERVENTIONS:  - Assess pt frequently for physical needs  - Identify cognitive and physical deficits and behaviors that affect risk of falls.   - decreased intake, treat as appropriate  - Assist with meals as needed  - Monitor I&O, WT and lab values  - Obtain nutritional consult as needed  - Optimize oral hygiene and moisture  - Encourage food from home; allow for food preferences  - Enhance eating

## 2021-02-03 NOTE — PROGRESS NOTES
[facility]    Progress Note      Assessment and Plan:   1. 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   Also, MRI the left neural foramen resulting in severe narrowing of the left neural foramen with mass effect upon the exiting left L1 nerve root. The osseous retropulsion also effaces the left lateral and subarticular recesses.      No high-grade canal narrowing i

## 2021-02-04 ENCOUNTER — PATIENT OUTREACH (OUTPATIENT)
Dept: CASE MANAGEMENT | Age: 76
End: 2021-02-04

## 2021-02-04 DIAGNOSIS — C79.31 BRAIN METASTASIS (HCC): ICD-10-CM

## 2021-02-04 DIAGNOSIS — Z02.9 ENCOUNTERS FOR ADMINISTRATIVE PURPOSE: ICD-10-CM

## 2021-02-04 DIAGNOSIS — R91.8 MASS OF LEFT LUNG: ICD-10-CM

## 2021-02-04 DIAGNOSIS — C79.51 METASTASIS TO VERTEBRAL COLUMN OF UNKNOWN ORIGIN (HCC): Primary | ICD-10-CM

## 2021-02-04 DIAGNOSIS — C80.1 METASTASIS TO VERTEBRAL COLUMN OF UNKNOWN ORIGIN (HCC): Primary | ICD-10-CM

## 2021-02-04 PROCEDURE — 1111F DSCHRG MED/CURRENT MED MERGE: CPT

## 2021-02-04 NOTE — PROGRESS NOTES
NCM attempted to contact patient for hospital follow up. Unable to leave message as line rings with no option for voicemail. NCM will try again later.

## 2021-02-05 ENCOUNTER — TELEPHONE (OUTPATIENT)
Dept: HEMATOLOGY/ONCOLOGY | Facility: HOSPITAL | Age: 76
End: 2021-02-05

## 2021-02-05 NOTE — PROGRESS NOTES
Initial Post Discharge Follow Up   Discharge Date: 2/3/21  Contact Date: 2/4/2021    Consent Verification:  Assessment Completed With: Patient  HIPAA Verified?   Yes    Discharge Dx:    Adenocarcinoma of the lung    Was TCC ordered: no      General:   • for Nausea. 60 tablet 0   • losartan Potassium 50 MG Oral Tab Take 1 tablet (50 mg total) by mouth daily. 30 tablet 3   • Senna 8.6 MG Oral Tab Take 1 tablet (8.6 mg total) by mouth 2 (two) times daily.  60 tablet 0   • Omega-3-acid Ethyl Esters (LOVAZA) 1 Feb 11, 2021  7:45 AM CST PET WHOLE BODY SCAN TO THIGH DOSE with [facility] PET DOSE  [facility] PET Scan ([address])    Please arrive 15 minutes early for this appointment.      BEFORE YOUR EXAM  -No strenuous acti minute scan. -There are no side effects to the injection. DIABETIC PATIENTS ONLY  -If you are taking Metformin, please withhold for at least 48 hours.   -Do not take any insulin within 4 hours of your appointment.   If you are taking long-acting insuli this morning was \"130/60something\" which she is very pleased about. She states that the doctor changed her Irbesartan to Losartan yesterday. Med review completed.  WHITLEY scheduled HFU with PCP on 2/16/2021 per pts request as she prefers to stack appts same

## 2021-02-08 ENCOUNTER — OFFICE VISIT (OUTPATIENT)
Dept: RADIATION ONCOLOGY | Facility: HOSPITAL | Age: 76
End: 2021-02-08
Attending: RADIOLOGY
Payer: COMMERCIAL

## 2021-02-08 ENCOUNTER — SOCIAL WORK SERVICES (OUTPATIENT)
Dept: HEMATOLOGY/ONCOLOGY | Facility: HOSPITAL | Age: 76
End: 2021-02-08

## 2021-02-08 VITALS
RESPIRATION RATE: 16 BRPM | SYSTOLIC BLOOD PRESSURE: 158 MMHG | DIASTOLIC BLOOD PRESSURE: 68 MMHG | TEMPERATURE: 97 F | HEART RATE: 73 BPM | OXYGEN SATURATION: 97 %

## 2021-02-08 DIAGNOSIS — M48.50XA: ICD-10-CM

## 2021-02-08 DIAGNOSIS — M25.569 KNEE PAIN, CHRONIC: ICD-10-CM

## 2021-02-08 DIAGNOSIS — C80.1 METASTASIS TO VERTEBRAL COLUMN OF UNKNOWN ORIGIN (HCC): Primary | ICD-10-CM

## 2021-02-08 DIAGNOSIS — G89.29 KNEE PAIN, CHRONIC: ICD-10-CM

## 2021-02-08 DIAGNOSIS — C79.51 METASTASIS TO VERTEBRAL COLUMN OF UNKNOWN ORIGIN (HCC): Primary | ICD-10-CM

## 2021-02-08 PROCEDURE — 77470 SPECIAL RADIATION TREATMENT: CPT | Performed by: RADIOLOGY

## 2021-02-08 PROCEDURE — 77290 THER RAD SIMULAJ FIELD CPLX: CPT | Performed by: RADIOLOGY

## 2021-02-08 NOTE — PROGRESS NOTES
Nursing Consultation Note  Patient: Anel Wilkinson  YOB: 1945  Age: 76year old  Radiation Oncologist: Dr. Criselda Loza  Referring Physician:  [email or web address]  Consult Date: 2/8/2021      Chemotherapy: NO  Labs: N/A  Imagi • Omega-3-acid Ethyl Esters (LOVAZA) 1 g Oral Cap Take 1 capsule (1 g total) by mouth daily.  90 capsule 3   • simvastatin 10 MG Oral Tab TAKE ONE TABLET BY MOUTH EVERY DAY IN THE EVENING 90 tablet 4   • Misc Natural Products (FLEX-A-MIN JOINT FLEX OR) Ta file      Years of education: Not on file      Highest education level: Not on file    Occupational History      Not on file    Social Needs      Financial resource strain: Not on file      Food insecurity        Worry: Not on file        Inability: Not on hours.    Education:  Yes    Are ADL's met? Yes  Does patient feel safe in their environment? Yes  Care decisions:  Patient and/or surrogate IS involved in care decisions. Advanced directives:  Patient DOES NOT have advanced directives.   Transportation: a hard time sleeping due to pain. Medical history and medications reviewed. Pt states she has been taking Morphine 15mg po every 2 hours and has ran out. She is taking Decadron.   I asked to Dr. Alexandre Mandujano to talk to her and possibly increasing her morphine for

## 2021-02-08 NOTE — PROGRESS NOTES
02/08: Referral for transport wheelchair made to [facility] [phone]. Order attached, sent to MD to cosign.   --  Cosigned order obtained, along with MD progress note containing wc statement of necessity, sent via fax to [facility]

## 2021-02-08 NOTE — PROGRESS NOTES
[facility] PROGRESS NOTE  Pt for XRT mapping today,    Also c/o severe pain of lower back, refractory to 4mg MSIR q3-4 hr     Refilled MSIR and will add MsContin 15 BID, bowel regimen  Also titrate dex 4mg to BID  Add neurotin if needed    Faustina Wang MD      Add

## 2021-02-09 LAB — ADEQUACY OF SPECIMEN: ADEQUATE

## 2021-02-10 LAB
ALK GENE - RESULT: NEGATIVE
ALK GENE FISH CELL COUNT: 100
ROS1 CELL COUNT: 100
ROS1 FISH RESULT: NEGATIVE

## 2021-02-11 ENCOUNTER — HOSPITAL ENCOUNTER (OUTPATIENT)
Dept: NUCLEAR MEDICINE | Facility: HOSPITAL | Age: 76
Discharge: HOME | End: 2021-02-11
Attending: INTERNAL MEDICINE
Payer: COMMERCIAL

## 2021-02-11 DIAGNOSIS — C34.90 LUNG CANCER METASTATIC TO BONE (HCC): Primary | ICD-10-CM

## 2021-02-11 DIAGNOSIS — C79.31 BRAIN METASTASIS (HCC): ICD-10-CM

## 2021-02-11 DIAGNOSIS — C79.51 LUNG CANCER METASTATIC TO BONE (HCC): Primary | ICD-10-CM

## 2021-02-11 DIAGNOSIS — C79.51 METASTASIS TO VERTEBRAL COLUMN OF UNKNOWN ORIGIN (HCC): ICD-10-CM

## 2021-02-11 DIAGNOSIS — C80.1 METASTASIS TO VERTEBRAL COLUMN OF UNKNOWN ORIGIN (HCC): ICD-10-CM

## 2021-02-11 DIAGNOSIS — G62.9 NEUROPATHY: ICD-10-CM

## 2021-02-11 DIAGNOSIS — R91.8 MASS OF LEFT LUNG: ICD-10-CM

## 2021-02-11 LAB — GLUCOSE BLDC GLUCOMTR-MCNC: 103 MG/DL (ref 70–99)

## 2021-02-11 PROCEDURE — 78815 PET IMAGE W/CT SKULL-THIGH: CPT | Performed by: INTERNAL MEDICINE

## 2021-02-11 PROCEDURE — 82962 GLUCOSE BLOOD TEST: CPT

## 2021-02-11 PROCEDURE — 77295 3-D RADIOTHERAPY PLAN: CPT | Performed by: RADIOLOGY

## 2021-02-11 RX ORDER — GABAPENTIN 300 MG/1
CAPSULE ORAL
Qty: 90 CAPSULE | Refills: 1 | Status: SHIPPED | OUTPATIENT
Start: 2021-02-11 | End: 2021-02-17 | Stop reason: DRUGHIGH

## 2021-02-11 RX ORDER — MORPHINE SULFATE 30 MG/1
30 TABLET ORAL EVERY 4 HOURS PRN
Qty: 60 TABLET | Refills: 0 | Status: ON HOLD | OUTPATIENT
Start: 2021-02-11 | End: 2021-02-27

## 2021-02-11 RX ORDER — MORPHINE SULFATE 15 MG/1
15 TABLET, FILM COATED, EXTENDED RELEASE ORAL EVERY 12 HOURS SCHEDULED
Qty: 30 TABLET | Refills: 0 | Status: SHIPPED | OUTPATIENT
Start: 2021-02-11 | End: 2021-02-16 | Stop reason: DRUGHIGH

## 2021-02-12 ENCOUNTER — OFFICE VISIT (OUTPATIENT)
Dept: RADIATION ONCOLOGY | Facility: HOSPITAL | Age: 76
End: 2021-02-12
Attending: RADIOLOGY
Payer: COMMERCIAL

## 2021-02-12 PROCEDURE — 77373 STRTCTC BDY RAD THER TX DLVR: CPT | Performed by: RADIOLOGY

## 2021-02-12 PROCEDURE — 77280 THER RAD SIMULAJ FIELD SMPL: CPT | Performed by: RADIOLOGY

## 2021-02-12 PROCEDURE — 77300 RADIATION THERAPY DOSE PLAN: CPT | Performed by: RADIOLOGY

## 2021-02-12 PROCEDURE — 77334 RADIATION TREATMENT AID(S): CPT | Performed by: RADIOLOGY

## 2021-02-13 NOTE — OPERATIVE REPORT
[facility]    PATIENT'S NAME: MARSHAL BRUNNER [address] [facility]   ATTENDING PHYSICIAN: Garcia Braga MD   OPERATING PHYSICIAN: Massiel Mishra MD   PATIENT ACCOUNT#:   [id number]    LOCATION:  [address] [place] [address] 200 RECORD #:   [id number]       DATE OF BIRTH:  [birth year]

## 2021-02-15 ENCOUNTER — APPOINTMENT (OUTPATIENT)
Dept: RADIATION ONCOLOGY | Facility: HOSPITAL | Age: 76
End: 2021-02-15
Attending: RADIOLOGY
Payer: COMMERCIAL

## 2021-02-15 PROCEDURE — 77373 STRTCTC BDY RAD THER TX DLVR: CPT | Performed by: RADIOLOGY

## 2021-02-16 ENCOUNTER — OFFICE VISIT (OUTPATIENT)
Dept: HEMATOLOGY/ONCOLOGY | Facility: HOSPITAL | Age: 76
End: 2021-02-16
Attending: INTERNAL MEDICINE
Payer: COMMERCIAL

## 2021-02-16 ENCOUNTER — OFFICE VISIT (OUTPATIENT)
Dept: INTERNAL MEDICINE CLINIC | Facility: CLINIC | Age: 76
End: 2021-02-16
Payer: COMMERCIAL

## 2021-02-16 ENCOUNTER — HOSPITAL ENCOUNTER (OUTPATIENT)
Dept: ULTRASOUND IMAGING | Facility: HOSPITAL | Age: 76
Discharge: HOME | End: 2021-02-16
Attending: INTERNAL MEDICINE
Payer: COMMERCIAL

## 2021-02-16 ENCOUNTER — APPOINTMENT (OUTPATIENT)
Dept: RADIATION ONCOLOGY | Facility: HOSPITAL | Age: 76
End: 2021-02-16
Attending: RADIOLOGY
Payer: COMMERCIAL

## 2021-02-16 ENCOUNTER — TELEPHONE (OUTPATIENT)
Dept: HEMATOLOGY/ONCOLOGY | Facility: HOSPITAL | Age: 76
End: 2021-02-16

## 2021-02-16 VITALS
TEMPERATURE: 99 F | OXYGEN SATURATION: 95 % | RESPIRATION RATE: 18 BRPM | HEIGHT: 60 IN | BODY MASS INDEX: 28.07 KG/M2 | DIASTOLIC BLOOD PRESSURE: 60 MMHG | WEIGHT: 143 LBS | HEART RATE: 72 BPM | SYSTOLIC BLOOD PRESSURE: 156 MMHG

## 2021-02-16 VITALS
BODY MASS INDEX: 28.07 KG/M2 | HEART RATE: 78 BPM | SYSTOLIC BLOOD PRESSURE: 156 MMHG | RESPIRATION RATE: 18 BRPM | TEMPERATURE: 99 F | HEIGHT: 60 IN | OXYGEN SATURATION: 97 % | DIASTOLIC BLOOD PRESSURE: 55 MMHG | WEIGHT: 143 LBS

## 2021-02-16 VITALS
OXYGEN SATURATION: 97 % | SYSTOLIC BLOOD PRESSURE: 156 MMHG | HEART RATE: 78 BPM | DIASTOLIC BLOOD PRESSURE: 55 MMHG | RESPIRATION RATE: 18 BRPM

## 2021-02-16 VITALS
WEIGHT: 149 LBS | DIASTOLIC BLOOD PRESSURE: 80 MMHG | SYSTOLIC BLOOD PRESSURE: 140 MMHG | BODY MASS INDEX: 29 KG/M2 | HEART RATE: 77 BPM | OXYGEN SATURATION: 97 %

## 2021-02-16 DIAGNOSIS — I10 ESSENTIAL HYPERTENSION: ICD-10-CM

## 2021-02-16 DIAGNOSIS — C80.1 METASTASIS TO VERTEBRAL COLUMN OF UNKNOWN ORIGIN (HCC): ICD-10-CM

## 2021-02-16 DIAGNOSIS — C34.32 MALIGNANT NEOPLASM OF LOWER LOBE OF LEFT LUNG (HCC): ICD-10-CM

## 2021-02-16 DIAGNOSIS — C79.31 BRAIN METASTASIS (HCC): ICD-10-CM

## 2021-02-16 DIAGNOSIS — R60.0 EDEMA OF BOTH LOWER LEGS: ICD-10-CM

## 2021-02-16 DIAGNOSIS — C34.32 MALIGNANT NEOPLASM OF LOWER LOBE OF LEFT LUNG (HCC): Primary | ICD-10-CM

## 2021-02-16 DIAGNOSIS — E27.8 LEFT ADRENAL MASS (HCC): ICD-10-CM

## 2021-02-16 DIAGNOSIS — E87.1 HYPONATREMIA: ICD-10-CM

## 2021-02-16 DIAGNOSIS — K59.03 THERAPEUTIC OPIOID-INDUCED CONSTIPATION (OIC): ICD-10-CM

## 2021-02-16 DIAGNOSIS — C79.51 METASTASIS TO VERTEBRAL COLUMN OF UNKNOWN ORIGIN (HCC): ICD-10-CM

## 2021-02-16 DIAGNOSIS — I87.2 VENOUS INSUFFICIENCY: ICD-10-CM

## 2021-02-16 DIAGNOSIS — G89.3 CANCER RELATED PAIN: Primary | ICD-10-CM

## 2021-02-16 DIAGNOSIS — G89.3 CANCER RELATED PAIN: ICD-10-CM

## 2021-02-16 DIAGNOSIS — Z71.89 ADVANCE CARE PLANNING: ICD-10-CM

## 2021-02-16 DIAGNOSIS — R53.1 WEAKNESS GENERALIZED: ICD-10-CM

## 2021-02-16 DIAGNOSIS — R60.0 EDEMA OF BOTH LOWER LEGS: Primary | ICD-10-CM

## 2021-02-16 DIAGNOSIS — D72.828 OTHER ELEVATED WHITE BLOOD CELL (WBC) COUNT: ICD-10-CM

## 2021-02-16 DIAGNOSIS — Z71.89 GOALS OF CARE, COUNSELING/DISCUSSION: ICD-10-CM

## 2021-02-16 DIAGNOSIS — M48.52XD: ICD-10-CM

## 2021-02-16 DIAGNOSIS — T40.2X5A THERAPEUTIC OPIOID-INDUCED CONSTIPATION (OIC): ICD-10-CM

## 2021-02-16 DIAGNOSIS — M48.50XA: ICD-10-CM

## 2021-02-16 LAB
ALBUMIN SERPL-MCNC: 3.5 G/DL (ref 3.4–5)
ALBUMIN/GLOB SERPL: 1.1 {RATIO} (ref 1–2)
ALP LIVER SERPL-CCNC: 83 U/L
ALT SERPL-CCNC: 27 U/L
ANION GAP SERPL CALC-SCNC: 3 MMOL/L (ref 0–18)
AST SERPL-CCNC: 11 U/L (ref 15–37)
BASOPHILS # BLD AUTO: 0.02 X10(3) UL (ref 0–0.2)
BASOPHILS NFR BLD AUTO: 0.1 %
BILIRUB SERPL-MCNC: 0.4 MG/DL (ref 0.1–2)
BUN BLD-MCNC: 23 MG/DL (ref 7–18)
BUN/CREAT SERPL: 35.4 (ref 10–20)
CALCIUM BLD-MCNC: 9.6 MG/DL (ref 8.5–10.1)
CHLORIDE SERPL-SCNC: 96 MMOL/L (ref 98–112)
CO2 SERPL-SCNC: 33 MMOL/L (ref 21–32)
CREAT BLD-MCNC: 0.65 MG/DL
DEPRECATED RDW RBC AUTO: 41.9 FL (ref 35.1–46.3)
EOSINOPHIL # BLD AUTO: 0 X10(3) UL (ref 0–0.7)
EOSINOPHIL NFR BLD AUTO: 0 %
ERYTHROCYTE [DISTWIDTH] IN BLOOD BY AUTOMATED COUNT: 14.4 % (ref 11–15)
GLOBULIN PLAS-MCNC: 3.3 G/DL (ref 2.8–4.4)
GLUCOSE BLD-MCNC: 137 MG/DL (ref 70–99)
HCT VFR BLD AUTO: 36.5 %
HGB BLD-MCNC: 11.8 G/DL
IMM GRANULOCYTES # BLD AUTO: 0.16 X10(3) UL (ref 0–1)
IMM GRANULOCYTES NFR BLD: 0.9 %
LYMPHOCYTES # BLD AUTO: 0.46 X10(3) UL (ref 1–4)
LYMPHOCYTES NFR BLD AUTO: 2.5 %
M PROTEIN MFR SERPL ELPH: 6.8 G/DL (ref 6.4–8.2)
MCH RBC QN AUTO: 26.3 PG (ref 26–34)
MCHC RBC AUTO-ENTMCNC: 32.3 G/DL (ref 31–37)
MCV RBC AUTO: 81.5 FL
MONOCYTES # BLD AUTO: 0.9 X10(3) UL (ref 0.1–1)
MONOCYTES NFR BLD AUTO: 4.9 %
NEUTROPHILS # BLD AUTO: 16.9 X10 (3) UL (ref 1.5–7.7)
NEUTROPHILS # BLD AUTO: 16.9 X10(3) UL (ref 1.5–7.7)
NEUTROPHILS NFR BLD AUTO: 91.6 %
OSMOLALITY SERPL CALC.SUM OF ELEC: 280 MOSM/KG (ref 275–295)
PATIENT FASTING Y/N/NP: NO
PLATELET # BLD AUTO: 259 10(3)UL (ref 150–450)
POTASSIUM SERPL-SCNC: 4 MMOL/L (ref 3.5–5.1)
RBC # BLD AUTO: 4.48 X10(6)UL
SODIUM SERPL-SCNC: 132 MMOL/L (ref 136–145)
WBC # BLD AUTO: 18.4 X10(3) UL (ref 4–11)

## 2021-02-16 PROCEDURE — 77373 STRTCTC BDY RAD THER TX DLVR: CPT | Performed by: RADIOLOGY

## 2021-02-16 PROCEDURE — 77336 RADIATION PHYSICS CONSULT: CPT | Performed by: RADIOLOGY

## 2021-02-16 PROCEDURE — 99205 OFFICE O/P NEW HI 60 MIN: CPT | Performed by: NURSE PRACTITIONER

## 2021-02-16 PROCEDURE — 3008F BODY MASS INDEX DOCD: CPT | Performed by: NURSE PRACTITIONER

## 2021-02-16 PROCEDURE — 3077F SYST BP >= 140 MM HG: CPT | Performed by: INTERNAL MEDICINE

## 2021-02-16 PROCEDURE — 3078F DIAST BP <80 MM HG: CPT | Performed by: NURSE PRACTITIONER

## 2021-02-16 PROCEDURE — 1111F DSCHRG MED/CURRENT MED MERGE: CPT | Performed by: INTERNAL MEDICINE

## 2021-02-16 PROCEDURE — 93970 EXTREMITY STUDY: CPT | Performed by: INTERNAL MEDICINE

## 2021-02-16 PROCEDURE — 99215 OFFICE O/P EST HI 40 MIN: CPT | Performed by: INTERNAL MEDICINE

## 2021-02-16 PROCEDURE — 3077F SYST BP >= 140 MM HG: CPT | Performed by: NURSE PRACTITIONER

## 2021-02-16 PROCEDURE — 96374 THER/PROPH/DIAG INJ IV PUSH: CPT

## 2021-02-16 PROCEDURE — 80053 COMPREHEN METABOLIC PANEL: CPT

## 2021-02-16 PROCEDURE — 85025 COMPLETE CBC W/AUTO DIFF WBC: CPT

## 2021-02-16 PROCEDURE — 3079F DIAST BP 80-89 MM HG: CPT | Performed by: INTERNAL MEDICINE

## 2021-02-16 PROCEDURE — 99495 TRANSJ CARE MGMT MOD F2F 14D: CPT | Performed by: INTERNAL MEDICINE

## 2021-02-16 RX ORDER — MORPHINE SULFATE 2 MG/ML
2 INJECTION, SOLUTION INTRAMUSCULAR; INTRAVENOUS ONCE
Status: CANCELLED
Start: 2021-02-16 | End: 2021-02-16

## 2021-02-16 RX ORDER — SENNA AND DOCUSATE SODIUM 50; 8.6 MG/1; MG/1
2 TABLET, FILM COATED ORAL 2 TIMES DAILY
Qty: 120 TABLET | Refills: 3 | Status: ON HOLD | OUTPATIENT
Start: 2021-02-16 | End: 2021-02-27

## 2021-02-16 RX ORDER — MORPHINE SULFATE 30 MG/1
30 TABLET, FILM COATED, EXTENDED RELEASE ORAL EVERY 12 HOURS SCHEDULED
Qty: 60 TABLET | Refills: 0 | Status: ON HOLD | OUTPATIENT
Start: 2021-02-16 | End: 2021-02-27

## 2021-02-16 RX ORDER — MORPHINE SULFATE 2 MG/ML
2 INJECTION, SOLUTION INTRAMUSCULAR; INTRAVENOUS ONCE
Status: COMPLETED | OUTPATIENT
Start: 2021-02-16 | End: 2021-02-16

## 2021-02-16 RX ORDER — MORPHINE SULFATE 2 MG/ML
INJECTION, SOLUTION INTRAMUSCULAR; INTRAVENOUS
Status: COMPLETED
Start: 2021-02-16 | End: 2021-02-16

## 2021-02-16 RX ADMIN — MORPHINE SULFATE 2 MG: 2 INJECTION, SOLUTION INTRAMUSCULAR; INTRAVENOUS at 10:51:00

## 2021-02-16 NOTE — PATIENT INSTRUCTIONS
For pain:  -Increase Morphine long acting to 30 mg every 12 hours  -Continue Morphine Immediate Release 30mg- take 1 tablet every 4-6 hours as needed    For constipation:  -START Senna-S- take 2 tabs twice daily  -STOP Suppository for right now

## 2021-02-16 NOTE — PROGRESS NOTES
PIV placed and labs drawn. Pt having back pain and has difficulty staying in position for radiation. Morphine 2 mg given IVP. Pt to radiation with saline lock in place. IV removed after radiation completed.

## 2021-02-16 NOTE — PROGRESS NOTES
RADIATION ONCOLOGY COMPLETION SUMMARY NOTE    DIAGNOSIS : Adenocarcinoma of the lung, EGFR status pending, with symptomatic L1 spine mets s/p cyberknife SBRT on 2/16/2021, also with brain metastases,  EGFR status pending    Dear Roberto Diaz and colleague be tapered in the next several weeks depending on her pain and neurologic symptoms.    I explained to the patient that it may take several weeks for her pain to improve as a compression fracture but ideally she will be able to serve pain medications in the

## 2021-02-16 NOTE — PATIENT INSTRUCTIONS
Follow up with Dr. Christian Wilder in 2 months (April 2021). Call [phone] to schedule a follow up appointment. If any issues arise in regard to radiation call [phone] to speak to a radiation nurse. If you need to speak to Dr. Christian Wilder call [phone].   Follow

## 2021-02-16 NOTE — PROGRESS NOTES
HPI:    Ela Qureshi is a 76year old female low back pain today for hospital follow up.    She was discharged from Inpatient hospital, [facility]  to Home   Admission Date: 1/30/21   Discharge Date: 2/3/21  Hospital Discharge Diagnoses (sinc cm in size focus of consolidation/increased density in the superior segment of the left lower lobe which was localized medially and likely extend into the mediastinum and left hilum was concerning for a mass lesion. Nickolas Donning is also some prominence of left h neck support. This is the only comfort position for her. She has pain when supine.  helps her with ADL. She walks with walker and does light house chores. Noticed dry mouth. Appetite fair. Denies headaches, nausea and vomiting.   Denies fever 37 U/L 11 (L)   ALKALINE PHOSPHATASE      55 - 142 U/L 83   Total Bilirubin      0.1 - 2.0 mg/dL 0.4   TOTAL PROTEIN      6.4 - 8.2 g/dL 6.8   Albumin      3.4 - 5.0 g/dL 3.5   Globulin      2.8 - 4.4 g/dL 3.3   A/G Ratio      1.0 - 2.0 1.1   Patient [facility] by osseous metastatic disease of the vertebral column and brain (R91.8,   C79.31, C80.1, C79.51). TECHNIQUE:    PET/CT study of the head and torso, done with 84.6 millicuries of UCDDPEPR-36 FDG (Fluorodeoxyglucose) injected into a right hand vein.   L is noted bilaterally. Activity of the right adrenal gland has SUV max measuring 4.5, while the SUV max of activity in the left adrenal gland measures 5.7. Scattered atherosclerotic vascular calcifications of the abdominal aorta are observed. were utilized for visualization of suspected pathology.   Images were performed without and with gadolinium contrast.       FINDINGS:          CEREBRUM:     There are numerous (over 10) subcentimeter enhancing foci within the cerebrum most Ni distributed a at 8:51 PM          COMPARISON: [facility], CT CHEST+ABDOMEN+PELVIS(ALL CNTRST ONLY)(CPT=71260/98081), 1/30/2021, 7:17 PM.     INDICATIONS:  radicular pain with numbness and tingling of both lower extremities L>R.   Dr. Zeus Mccullough:    ZOILA hutson narrowing. L3-L4:   Mild hypertrophic changes of the facet joints and ligamentum flavum without significant canal or foraminal narrowing.   L4-L5:   Mild hypertrophic changes of the facet joints and ligamentum flavum without significant canal or foraminal reduction was used. Dose information is transmitted to the ACR [employer] of Radiology) NRDR ([address]) which includes the Dose Index Registry. FINDINGS:  CARDIAC:         No enlargement or pericardial thickening.   No si Mass/metastatic lesion must be excluded. KIDNEYS:          Normal.  No mass, obstruction, or calcification. AORTA/VASCULAR:      Normal.  No aneurysm or dissection. RETROPERITONEUM:               No mass or enlarged adenopathy.     BOWEL/MESENTERY: total) by mouth every 4 (four) hours as needed for Pain.     •  gabapentin 300 MG Oral Cap, Start 300 mg PD x 1 day, then 300 mg PO BID x 1 day, then 300 mg PO TID    •  morphINE Sulfate IR 30 MG Oral Tab, Take 1 tablet (30 mg total) by mouth every 4 (four) mother; Heart Disease in her father; Hypertension in her father, mother, paternal grandfather, and paternal grandmother; Lipids in her mother; Stroke (age of onset: 64) in her father. She  reports that she has never smoked.  She has never used smokeless t squamous differentiation.   · EGFR pending   · Dr. Jennyfer Ayoub managing        Pathological compression fracture of cervical vertebra with routine healing, subsequent encounter   - S/p  cyberknife SBRT  perr Dr. Amber Jordan  -MS Contin 15 mg p.o. twice daily,

## 2021-02-16 NOTE — TELEPHONE ENCOUNTER
US called and notified Dr. Sunil Mccormack- US of lower extremities was normal. Dr. Sunil Mccormack spoke with pt and notified US normal. Pt verbalizes understading.

## 2021-02-16 NOTE — PROGRESS NOTES
[facility] Radiation Treatment Management Note 1-5    Patient:  Dustin Xiao  Age:  76year old  Visit Diagnosis:  No diagnosis found.   Primary Rad/Onc:  Dr. Venita Srivastava Silvestre    Site Delivered Dose (cGy) Prescribed Dose (cGy) Fraction

## 2021-02-16 NOTE — PROGRESS NOTES
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  30 tablet 0   • dexamethasone (DECADRON) 4 MG tablet Take 1 tablet (4 mg total) by mouth daily with breakfast. 30 tablet 0   • ondansetron 4 MG Oral Tab drinks    Drug use: Not on file      Family History   Problem Relation Age of Onset   • Hypertension Mother    • Lipids Mother         hyperlipidemia   • Glaucoma Mother    • Hypertension Father    • Heart Disease Father    • Stroke Father 64        (cause osimertinib is highly effective, and in patients with good response, oligometastatic disease is treated with radiation therapy after response to therapy.  Also discussed that with the Confucianism of immunotherapy, if PD-L1 positive and no other mutations, she m [email or web address], Palomo Poole MD, Lab. Director   Adequacy of Specimen Adequate    Tumor Proportion Score 81-90%    PDL1 22C3 by IHC Result Exp >=50%    Comment:   These results have been reviewed and approved by Ferman Bernheim, M.D.  Controls perform 36Z8 IHC with Tumor Proportion Score interpretation is an   immunohistochemical assay using Monoclonal Mouse Anti-PD-L1, Clone   22C3 intended for use in the detection of PD-L1 protein in   formalin-fixed, paraffin-embedded (FFPE) NSCLC specimens using   [address] [place], [place] [address]   INTERPRETIVE INFORMATION: ROS1, FISH     Fluorescence in situ hybridization (FISH) analysis was performed   on a section from a paraffin-embedded tissue block using   differentially labeled fluorescent probes targeting the upst Notes  Component 2/2/21 1114   Alk Gene - Source LLL Lung    Comment: Performed by Mariella Crowder [address], [id number] [facility] [phone]   www. [facility] Sherry Perdomo MD, Lab.  Director   Alk Gene - Result Negative    Comment:   Controls wer Administration. This test was   performed in a CLIA certified laboratory and is intended for   clinical purposes.     ALK FISH Reference Number [id number]    ALK Gene FISH Cell Count 100    Scoring Method Manual    Resulting Agency [facility]         Specim

## 2021-02-17 ENCOUNTER — TELEPHONE (OUTPATIENT)
Dept: HEMATOLOGY/ONCOLOGY | Facility: HOSPITAL | Age: 76
End: 2021-02-17

## 2021-02-17 DIAGNOSIS — C34.90 LUNG CANCER METASTATIC TO BONE (HCC): ICD-10-CM

## 2021-02-17 DIAGNOSIS — C34.32 MALIGNANT NEOPLASM OF LOWER LOBE OF LEFT LUNG (HCC): Primary | ICD-10-CM

## 2021-02-17 DIAGNOSIS — C79.51 LUNG CANCER METASTATIC TO BONE (HCC): ICD-10-CM

## 2021-02-17 DIAGNOSIS — G62.9 NEUROPATHY: Primary | ICD-10-CM

## 2021-02-17 RX ORDER — GABAPENTIN 100 MG/1
100 CAPSULE ORAL 3 TIMES DAILY
Qty: 90 CAPSULE | Refills: 1 | Status: SHIPPED | OUTPATIENT
Start: 2021-02-17 | End: 2021-04-06

## 2021-02-17 NOTE — CONSULTS
Palliative Care Consult Note     Patient Name: Rosangela Marr   YOB: 1945   Medical Record Number: [id number]   Date of visit: 2/16/2021     Chief Complaint/Reason for Visit:  Patient presents with:  Palliative Care    Reason for Consu screening mammogram     Knee pain, chronic     Hyperlipidemia     Hypertension     Routine general medical examination at a health care facility     Glaucoma suspect     Pseudophakia of both eyes     Vitreous floaters of both eyes     Vitreous opacity detachment Neg         multiple   • Diabetes Neg        Social History:  Social History    Socioeconomic History      Marital status:       Spouse name: Not on file      Number of children: Not on file      Years of education: Not on file      Highe Surrogate under [facility] Surrogate Law: Pacheco Cavazos (; [phone])    I discussed the purpose of designating a HCPOA.  I informed patient that they may choose a HCPOA plus 2 successor agents and explained that the Ijeoma Jaramillo goes in to effect in the even Ethyl Esters (LOVAZA) 1 g Oral Cap Take 1 capsule (1 g total) by mouth daily. 90 capsule 3   • simvastatin 10 MG Oral Tab TAKE ONE TABLET BY MOUTH EVERY DAY IN THE EVENING 90 tablet 4   • Misc Natural Products (FLEX-A-MIN JOINT FLEX OR) Take by mouth.  Take Pulmonary:      Effort: Pulmonary effort is normal. No respiratory distress. Abdominal:      General: Bowel sounds are normal. There is no distension. Palpations: Abdomen is soft. Musculoskeletal:      Right lower leg: Edema present.       Left l 1 tab Q4H/PRN for breakthrough cancer pain  -Completed CK treatments today  -Continue Gabapentin and Decadron per Dr. Dumont Pouch orders  -Will continue to monitor      Therapeutic opioid induced constipation  -Provided CAP-C constipation algorithm   -Start Senna day of the encounter: 60 minutes.

## 2021-02-17 NOTE — TELEPHONE ENCOUNTER
Yareli Hill called. She states she is in excruciating pain and is at an 8 or a 9 out of 10. She states she has not slept for the last few days. She would like to request gabapentin 100mg. She states the 300mg that she has is much too strong.      She would als

## 2021-02-17 NOTE — TELEPHONE ENCOUNTER
Called to let her know Dr Maria Ines Naidu had ordered lower dose of Jailyn for her, she verbalizes understanding.

## 2021-02-17 NOTE — TELEPHONE ENCOUNTER
Returned call to Luann Mora, she had spoken to [facility] yesterday and there was some dose adjustments with her meds, she was doing well with the pain medication but went walking in [place] and had increased pain 7/10, was using her pain medication but still havi

## 2021-02-17 NOTE — TELEPHONE ENCOUNTER
I spoke with Krystyna Morrow and her  Alexandre Sweeney via telephone today.     To clarify, Krystyna Morrow and Alexandre Sweeney state that she was not walking around [place], however, they live in [address] [place] [address] and were unable to make it back in today for an appointment if that was requested explained to Yazmin Lockhart that [email or web address] will not cover supplemental oxygen unless SPO2 is less than 90%.   Yazmin Lockhart understands this but is still asking for pulmonology referral.  I will forward this request on to Dr. Alessandro Malik and team.    Flaca Levin and [employer]

## 2021-02-17 NOTE — TELEPHONE ENCOUNTER
Michelle Joseph at [phone] is calling because she is in pain, pain is 7/10, Dr. Julius Yadav pt and she spoke to [facility] on 2/16/21.

## 2021-02-18 ENCOUNTER — PATIENT MESSAGE (OUTPATIENT)
Dept: HEMATOLOGY/ONCOLOGY | Facility: HOSPITAL | Age: 76
End: 2021-02-18

## 2021-02-18 ENCOUNTER — TELEPHONE (OUTPATIENT)
Dept: HEMATOLOGY/ONCOLOGY | Facility: HOSPITAL | Age: 76
End: 2021-02-18

## 2021-02-18 DIAGNOSIS — C34.32 MALIGNANT NEOPLASM OF LOWER LOBE OF LEFT LUNG (HCC): ICD-10-CM

## 2021-02-18 DIAGNOSIS — C79.31 BRAIN METASTASIS (HCC): ICD-10-CM

## 2021-02-18 DIAGNOSIS — Z01.810 PRE-OPERATIVE CARDIOVASCULAR EXAMINATION: Primary | ICD-10-CM

## 2021-02-18 NOTE — TELEPHONE ENCOUNTER
----- Message from Blayne Lozano. Samir Grullon sent at 2/18/2021  1:53 PM CST -----  Regarding: Test Results Question  Contact: [phone]  Dr. Julius Yadav,    I'm sending a message on my parents behalf.   We note that my mother's mutation panel revealed the presence of the EGF

## 2021-02-18 NOTE — PATIENT INSTRUCTIONS
Text SUPPORT1 to 76376 to learn if you may be eligible for financial support with your medication(s). Msg & Data Rates May Apply. Msg freq varies. Terms apply. Text HELP for help. Text STOP to end.    Osimertinib oral tablets  Brand Name: Mima Buerger · signs and symptoms of low potassium like muscle cramps or muscle pain; chest pain; dizziness; feeling faint or lightheaded, falls; palpitations; breathing problems; or fast, irregular heartbeat  · swelling of the legs or ankles  · unusually weak or tired Visit your doctor for regular check ups. Report any side effects. Continue your course of treatment unless your doctor tells you to stop. You will need blood work done while you are taking this medicine.   Do not become pregnant while taking this medicine o

## 2021-02-18 NOTE — TELEPHONE ENCOUNTER
Called with results of NGS. 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 Will start on treatment with osimertinib given brain metastasis will start on recommended dose of 160 mg po daily.   Briefly d/w patient and family potential side

## 2021-02-18 NOTE — TELEPHONE ENCOUNTER
I spoke with Praveen Fairchild this morning. Her  picked up the gabapentin 100 mg and he started taking this med last night. Praveen Fairchild states that her pain is much better controlled today. She is not in excruciating pain like she was yesterday.   Praveen Fairchild verbal

## 2021-02-19 ENCOUNTER — TELEPHONE (OUTPATIENT)
Dept: INTERNAL MEDICINE CLINIC | Facility: CLINIC | Age: 76
End: 2021-02-19

## 2021-02-19 ENCOUNTER — TELEPHONE (OUTPATIENT)
Dept: HEMATOLOGY/ONCOLOGY | Facility: HOSPITAL | Age: 76
End: 2021-02-19

## 2021-02-19 NOTE — TELEPHONE ENCOUNTER
Called Patient unable to reach Left  and let them know that insurance needs the Card TTE and EKG results in order to have the medication approved. Reminded then to call and schedule central scheduling number provided.   Asked to call back with any questi

## 2021-02-19 NOTE — TELEPHONE ENCOUNTER
DME  Received: 2 days ago  Message Contents   Co, Starlette Carbo, MD Benard Kussmaul, RN             If you can help her get a shower bench  with handle through insurance. She has lumbar spine mets from lung Ca, has difficulty standing. Thanks.         Amanda

## 2021-02-21 ENCOUNTER — HOSPITAL ENCOUNTER (INPATIENT)
Facility: HOSPITAL | Age: 76
LOS: 6 days | Discharge: HOME HEALTH | DRG: 543 | End: 2021-02-27
Attending: EMERGENCY MEDICINE | Admitting: HOSPITALIST
Payer: MEDICARE

## 2021-02-21 ENCOUNTER — APPOINTMENT (OUTPATIENT)
Dept: GENERAL RADIOLOGY | Facility: HOSPITAL | Age: 76
DRG: 543 | End: 2021-02-21
Attending: EMERGENCY MEDICINE
Payer: MEDICARE

## 2021-02-21 DIAGNOSIS — T40.2X5A THERAPEUTIC OPIOID-INDUCED CONSTIPATION (OIC): ICD-10-CM

## 2021-02-21 DIAGNOSIS — E87.1 HYPONATREMIA: ICD-10-CM

## 2021-02-21 DIAGNOSIS — K59.03 THERAPEUTIC OPIOID-INDUCED CONSTIPATION (OIC): ICD-10-CM

## 2021-02-21 DIAGNOSIS — M54.9 BACK PAIN WITHOUT RADIATION: Primary | ICD-10-CM

## 2021-02-21 LAB
ANION GAP SERPL CALC-SCNC: 5 MMOL/L (ref 0–18)
BASOPHILS # BLD AUTO: 0.02 X10(3) UL (ref 0–0.2)
BASOPHILS NFR BLD AUTO: 0.2 %
BUN BLD-MCNC: 13 MG/DL (ref 7–18)
BUN/CREAT SERPL: 26 (ref 10–20)
CALCIUM BLD-MCNC: 9.1 MG/DL (ref 8.5–10.1)
CHLORIDE SERPL-SCNC: 94 MMOL/L (ref 98–112)
CO2 SERPL-SCNC: 32 MMOL/L (ref 21–32)
CREAT BLD-MCNC: 0.5 MG/DL
DEPRECATED RDW RBC AUTO: 41.9 FL (ref 35.1–46.3)
EOSINOPHIL # BLD AUTO: 0.21 X10(3) UL (ref 0–0.7)
EOSINOPHIL NFR BLD AUTO: 1.8 %
ERYTHROCYTE [DISTWIDTH] IN BLOOD BY AUTOMATED COUNT: 14.4 % (ref 11–15)
GLUCOSE BLD-MCNC: 94 MG/DL (ref 70–99)
HCT VFR BLD AUTO: 34.1 %
HGB BLD-MCNC: 11.1 G/DL
IMM GRANULOCYTES # BLD AUTO: 0.18 X10(3) UL (ref 0–1)
IMM GRANULOCYTES NFR BLD: 1.5 %
LYMPHOCYTES # BLD AUTO: 0.63 X10(3) UL (ref 1–4)
LYMPHOCYTES NFR BLD AUTO: 5.3 %
MCH RBC QN AUTO: 26.3 PG (ref 26–34)
MCHC RBC AUTO-ENTMCNC: 32.6 G/DL (ref 31–37)
MCV RBC AUTO: 80.8 FL
MONOCYTES # BLD AUTO: 1.16 X10(3) UL (ref 0.1–1)
MONOCYTES NFR BLD AUTO: 9.7 %
NEUTROPHILS # BLD AUTO: 9.77 X10 (3) UL (ref 1.5–7.7)
NEUTROPHILS # BLD AUTO: 9.77 X10(3) UL (ref 1.5–7.7)
NEUTROPHILS NFR BLD AUTO: 81.5 %
NT-PROBNP SERPL-MCNC: 63 PG/ML (ref ?–450)
OSMOLALITY SERPL CALC.SUM OF ELEC: 272 MOSM/KG (ref 275–295)
PLATELET # BLD AUTO: 240 10(3)UL (ref 150–450)
POTASSIUM SERPL-SCNC: 3.6 MMOL/L (ref 3.5–5.1)
RBC # BLD AUTO: 4.22 X10(6)UL
SARS-COV-2 RNA RESP QL NAA+PROBE: NOT DETECTED
SODIUM SERPL-SCNC: 131 MMOL/L (ref 136–145)
TROPONIN I SERPL-MCNC: <0.045 NG/ML (ref ?–0.04)
WBC # BLD AUTO: 12 X10(3) UL (ref 4–11)

## 2021-02-21 PROCEDURE — 71045 X-RAY EXAM CHEST 1 VIEW: CPT | Performed by: EMERGENCY MEDICINE

## 2021-02-21 RX ORDER — ONDANSETRON 2 MG/ML
4 INJECTION INTRAMUSCULAR; INTRAVENOUS ONCE
Status: COMPLETED | OUTPATIENT
Start: 2021-02-21 | End: 2021-02-21

## 2021-02-21 RX ORDER — MORPHINE SULFATE 4 MG/ML
4 INJECTION, SOLUTION INTRAMUSCULAR; INTRAVENOUS ONCE
Status: COMPLETED | OUTPATIENT
Start: 2021-02-21 | End: 2021-02-21

## 2021-02-22 PROBLEM — Z71.89 ADVANCE CARE PLANNING: Status: ACTIVE | Noted: 2021-02-22

## 2021-02-22 PROBLEM — Z71.89 GOALS OF CARE, COUNSELING/DISCUSSION: Status: ACTIVE | Noted: 2021-02-22

## 2021-02-22 LAB
ALBUMIN SERPL-MCNC: 2.7 G/DL (ref 3.4–5)
ALBUMIN/GLOB SERPL: 0.8 {RATIO} (ref 1–2)
ALP LIVER SERPL-CCNC: 72 U/L
ALT SERPL-CCNC: 32 U/L
ANION GAP SERPL CALC-SCNC: 2 MMOL/L (ref 0–18)
AST SERPL-CCNC: 17 U/L (ref 15–37)
BACTERIA UR QL AUTO: NEGATIVE /HPF
BASOPHILS # BLD AUTO: 0.02 X10(3) UL (ref 0–0.2)
BASOPHILS NFR BLD AUTO: 0.2 %
BILIRUB SERPL-MCNC: 0.5 MG/DL (ref 0.1–2)
BILIRUB UR QL: NEGATIVE
BUN BLD-MCNC: 13 MG/DL (ref 7–18)
BUN/CREAT SERPL: 24.1 (ref 10–20)
CALCIUM BLD-MCNC: 8.6 MG/DL (ref 8.5–10.1)
CHLORIDE SERPL-SCNC: 97 MMOL/L (ref 98–112)
CLARITY UR: CLEAR
CO2 SERPL-SCNC: 31 MMOL/L (ref 21–32)
COLOR UR: YELLOW
CREAT BLD-MCNC: 0.54 MG/DL
DEPRECATED RDW RBC AUTO: 41.7 FL (ref 35.1–46.3)
EOSINOPHIL # BLD AUTO: 0.22 X10(3) UL (ref 0–0.7)
EOSINOPHIL NFR BLD AUTO: 2.3 %
ERYTHROCYTE [DISTWIDTH] IN BLOOD BY AUTOMATED COUNT: 14.2 % (ref 11–15)
GLOBULIN PLAS-MCNC: 3.3 G/DL (ref 2.8–4.4)
GLUCOSE BLD-MCNC: 103 MG/DL (ref 70–99)
GLUCOSE UR-MCNC: NEGATIVE MG/DL
HAV IGM SER QL: 2.6 MG/DL (ref 1.6–2.6)
HCT VFR BLD AUTO: 32.7 %
HGB BLD-MCNC: 10.5 G/DL
HGB UR QL STRIP.AUTO: NEGATIVE
IMM GRANULOCYTES # BLD AUTO: 0.17 X10(3) UL (ref 0–1)
IMM GRANULOCYTES NFR BLD: 1.8 %
KETONES UR-MCNC: NEGATIVE MG/DL
LYMPHOCYTES # BLD AUTO: 0.37 X10(3) UL (ref 1–4)
LYMPHOCYTES NFR BLD AUTO: 3.8 %
M PROTEIN MFR SERPL ELPH: 6 G/DL (ref 6.4–8.2)
MCH RBC QN AUTO: 26.1 PG (ref 26–34)
MCHC RBC AUTO-ENTMCNC: 32.1 G/DL (ref 31–37)
MCV RBC AUTO: 81.3 FL
MONOCYTES # BLD AUTO: 0.93 X10(3) UL (ref 0.1–1)
MONOCYTES NFR BLD AUTO: 9.7 %
NEUTROPHILS # BLD AUTO: 7.92 X10 (3) UL (ref 1.5–7.7)
NEUTROPHILS # BLD AUTO: 7.92 X10(3) UL (ref 1.5–7.7)
NEUTROPHILS NFR BLD AUTO: 82.2 %
NITRITE UR QL STRIP.AUTO: NEGATIVE
OSMOLALITY SERPL CALC.SUM OF ELEC: 270 MOSM/KG (ref 275–295)
PH UR: 6 [PH] (ref 5–8)
PHOSPHATE SERPL-MCNC: 2.6 MG/DL (ref 2.5–4.9)
PLATELET # BLD AUTO: 203 10(3)UL (ref 150–450)
POTASSIUM SERPL-SCNC: 4.3 MMOL/L (ref 3.5–5.1)
PROT UR-MCNC: NEGATIVE MG/DL
RBC # BLD AUTO: 4.02 X10(6)UL
RBC #/AREA URNS AUTO: 1 /HPF
SODIUM SERPL-SCNC: 130 MMOL/L (ref 136–145)
SP GR UR STRIP: 1.01 (ref 1–1.03)
UROBILINOGEN UR STRIP-ACNC: <2
WBC # BLD AUTO: 9.6 X10(3) UL (ref 4–11)
WBC #/AREA URNS AUTO: 2 /HPF

## 2021-02-22 PROCEDURE — 99223 1ST HOSP IP/OBS HIGH 75: CPT | Performed by: HOSPITALIST

## 2021-02-22 PROCEDURE — 99255 IP/OBS CONSLTJ NEW/EST HI 80: CPT | Performed by: NURSE PRACTITIONER

## 2021-02-22 PROCEDURE — 99253 IP/OBS CNSLTJ NEW/EST LOW 45: CPT | Performed by: INTERNAL MEDICINE

## 2021-02-22 RX ORDER — DOXEPIN HYDROCHLORIDE 50 MG/1
1 CAPSULE ORAL DAILY
Status: DISCONTINUED | OUTPATIENT
Start: 2021-02-22 | End: 2021-02-27

## 2021-02-22 RX ORDER — HYDROMORPHONE HYDROCHLORIDE 1 MG/ML
0.5 INJECTION, SOLUTION INTRAMUSCULAR; INTRAVENOUS; SUBCUTANEOUS ONCE
Status: COMPLETED | OUTPATIENT
Start: 2021-02-22 | End: 2021-02-22

## 2021-02-22 RX ORDER — OXYCODONE HCL 10 MG/1
20 TABLET, FILM COATED, EXTENDED RELEASE ORAL EVERY 12 HOURS
Status: DISCONTINUED | OUTPATIENT
Start: 2021-02-22 | End: 2021-02-23

## 2021-02-22 RX ORDER — GABAPENTIN 100 MG/1
100 CAPSULE ORAL 3 TIMES DAILY
Status: DISCONTINUED | OUTPATIENT
Start: 2021-02-22 | End: 2021-02-27

## 2021-02-22 RX ORDER — DEXAMETHASONE 4 MG/1
4 TABLET ORAL 3 TIMES DAILY
Status: DISCONTINUED | OUTPATIENT
Start: 2021-02-22 | End: 2021-02-27

## 2021-02-22 RX ORDER — HYDROMORPHONE HYDROCHLORIDE 1 MG/ML
0.5 INJECTION, SOLUTION INTRAMUSCULAR; INTRAVENOUS; SUBCUTANEOUS
Status: DISCONTINUED | OUTPATIENT
Start: 2021-02-22 | End: 2021-02-27

## 2021-02-22 RX ORDER — POTASSIUM CHLORIDE 20 MEQ/1
40 TABLET, EXTENDED RELEASE ORAL EVERY 4 HOURS
Status: COMPLETED | OUTPATIENT
Start: 2021-02-22 | End: 2021-02-22

## 2021-02-22 RX ORDER — LOSARTAN POTASSIUM 50 MG/1
25 TABLET ORAL DAILY
Status: DISCONTINUED | OUTPATIENT
Start: 2021-02-23 | End: 2021-02-27

## 2021-02-22 RX ORDER — ONDANSETRON 4 MG/1
4 TABLET, ORALLY DISINTEGRATING ORAL EVERY 8 HOURS PRN
Status: DISCONTINUED | OUTPATIENT
Start: 2021-02-22 | End: 2021-02-27

## 2021-02-22 RX ORDER — BISACODYL 10 MG
10 SUPPOSITORY, RECTAL RECTAL
Status: DISCONTINUED | OUTPATIENT
Start: 2021-02-22 | End: 2021-02-27

## 2021-02-22 RX ORDER — POLYETHYLENE GLYCOL 3350 17 G/17G
17 POWDER, FOR SOLUTION ORAL DAILY
Status: DISCONTINUED | OUTPATIENT
Start: 2021-02-22 | End: 2021-02-27

## 2021-02-22 RX ORDER — HYDROMORPHONE HYDROCHLORIDE 4 MG/1
2 TABLET ORAL
Status: DISCONTINUED | OUTPATIENT
Start: 2021-02-22 | End: 2021-02-23

## 2021-02-22 RX ORDER — HYDROMORPHONE HYDROCHLORIDE 1 MG/ML
0.3 INJECTION, SOLUTION INTRAMUSCULAR; INTRAVENOUS; SUBCUTANEOUS
Status: DISCONTINUED | OUTPATIENT
Start: 2021-02-22 | End: 2021-02-27

## 2021-02-22 RX ORDER — DICLOFENAC EPOLAMINE 180 MG/1
1 PATCH TOPICAL EVERY 12 HOURS
Status: DISCONTINUED | OUTPATIENT
Start: 2021-02-22 | End: 2021-02-27

## 2021-02-22 RX ORDER — SENNA AND DOCUSATE SODIUM 50; 8.6 MG/1; MG/1
2 TABLET, FILM COATED ORAL 2 TIMES DAILY
Status: DISCONTINUED | OUTPATIENT
Start: 2021-02-22 | End: 2021-02-25

## 2021-02-22 RX ORDER — PRAVASTATIN SODIUM 20 MG
20 TABLET ORAL NIGHTLY
Refills: 4 | Status: DISCONTINUED | OUTPATIENT
Start: 2021-02-22 | End: 2021-02-27

## 2021-02-22 RX ORDER — MORPHINE SULFATE 15 MG/1
30 TABLET, FILM COATED, EXTENDED RELEASE ORAL EVERY 12 HOURS SCHEDULED
Status: DISCONTINUED | OUTPATIENT
Start: 2021-02-22 | End: 2021-02-22

## 2021-02-22 RX ORDER — ONDANSETRON 2 MG/ML
4 INJECTION INTRAMUSCULAR; INTRAVENOUS EVERY 6 HOURS PRN
Status: DISCONTINUED | OUTPATIENT
Start: 2021-02-22 | End: 2021-02-27

## 2021-02-22 RX ORDER — DULOXETIN HYDROCHLORIDE 30 MG/1
30 CAPSULE, DELAYED RELEASE ORAL DAILY
Status: DISCONTINUED | OUTPATIENT
Start: 2021-02-22 | End: 2021-02-24

## 2021-02-22 RX ORDER — MORPHINE SULFATE 15 MG/1
30 TABLET ORAL EVERY 4 HOURS PRN
Status: DISCONTINUED | OUTPATIENT
Start: 2021-02-22 | End: 2021-02-22

## 2021-02-22 NOTE — ED NOTES
Orders for admission. Patient and/or next of kin aware of plan and is ready to go upstairs. Please call ED RN Dom Quintanilla at listed extension should you have any further questions or concerns. Thank you.     Nurse and Ext: [facility], [id number]    Chief Presentation: MICHAEL

## 2021-02-22 NOTE — ED PROVIDER NOTES
Patient Seen in: [facility] AND [facility] Emergency Department      History   Patient presents with:  Shortness Of Breath  Anxiety  Pain    Stated Complaint: Heart palpitations/ Shortness of breath    HPI/Subjective:   HPI    28-year-old female with history of Social History    Tobacco Use      Smoking status: Never Smoker      Smokeless tobacco: Never Used    Alcohol use: No      Alcohol/week: 0.0 standard drinks    Drug use: Not on file             Review of Systems   Constitutional: Negative for fever. General: Skin is warm. Neurological:      Mental Status: She is alert.       Comments: Able to lift legs off of cart, 5 out of 5 hand grasp strength bilaterally, pt ambulatory   Psychiatric:         Mood and Affect: Mood normal.             ED Course mOsm/kg    GFR, Non- 94 >=60    GFR, -American 109 >=60   TROPONIN I    Collection Time: 02/21/21  9:01 PM   Result Value Ref Range    Troponin <0.045 <0.045 ng/mL   PRO BETA NATRIURETIC PEPTIDE    Collection Time: 02/21/21  9:01 PM leukocytosis, anemia, troponin negative, BNP roopa, hyponatremia, rapid covid negative  - morphine ordered  - pt too uncomfortable to go home  - discussed with Dr. Britt Rodriguez and Dr. Solomno Kumari - informed them of pt admission  - morphine ordered      The patient w

## 2021-02-22 NOTE — PROGRESS NOTES
Patient admitted to floor from ED for lower back pain. Patient is alert and oriented x4.  at bedside to assist with admission. Vitals Blood pressure (!) 121/98, pulse 81, temperature 97.3 °F (36.3 °C), temperature source Oral, resp.  rate 18, height

## 2021-02-22 NOTE — CONSULTS
[facility] HOSP - [facility]    Report of Hematology/Oncology Consultation    Damion Gutierrez [place] Patient Status:  Inpatient    1945 MRN [id number]   Location 462/462-A Attending Maddie Glasgow MD   Date of admission 2021  8:01 PM   ANTOINE CHAIDEZ Plan Provider: Edvin Anguiano MD  Treatment goal: Palliative  Line of treatment: [No plan line of treatment]     Malignant neoplasm of lower lobe of left lung ([facility]   2/16/2021 Initial Diagnosis    Malignant neoplasm of lower lobe of left lung ([facility]     2/ •  [COMPLETED] iopamidol (ISOVUE-M) 76 % injection 100 mL, 100 mL, Intravenous, ONCE PRN, Dre Millan MD, 80 mL at 01/30/21 1925    •  [COMPLETED] morphINE sulfate (PF) 4 MG/ML injection 2 mg, 2 mg, Intravenous, Once, Dre Millan MD, 2 mg at •  Omega-3-acid Ethyl Esters (LOVAZA) 1 g Oral Cap, Take 1 capsule (1 g total) by mouth daily. , Disp: 90 capsule, Rfl: 3    •  Misc Natural Products (FLEX-A-MIN JOINT FLEX OR), Take by mouth.  Takes two tablets daily, Disp: , Rfl:     •  Multiple Vitamins O Vital Signs: /63 (BP Location: Right arm)   Pulse 92   Temp 99.1 °F (37.3 °C) (Oral)   Resp 20   Ht 1.524 m (5')   Wt 64.6 kg (142 lb 6.4 oz)   SpO2 94%   BMI 27.81 kg/m²   HEENT: EOMs intact. PERRL. Oropharynx is clear. Neck: No JVD.  No palpable l Pro-Beta Natriuretic Peptide 63 <450 pg/mL   CBC W/ DIFFERENTIAL    Collection Time: 02/21/21  9:01 PM   Result Value Ref Range    WBC 12.0 (H) 4.0 - 11.0 x10(3) uL    RBC 4.22 3.80 - 5.30 x10(6)uL    HGB 11.1 (L) 12.0 - 16.0 g/dL    HCT 34.1 (L) 35.0 - 4 BUN/CREA Ratio 24.1 (H) 10.0 - 20.0    Calcium, Total 8.6 8.5 - 10.1 mg/dL    Calculated Osmolality 270 (L) 275 - 295 mOsm/kg    GFR, Non- 92 >=60    GFR, -American 106 >=60    ALT 32 13 - 56 U/L    AST      Alkaline Phosphatase 72 CONCLUSION:  Stable chest demonstrating left lung mass without further radiographically evident acute intrathoracic process.     Dictated by (CST): Jono Thomas MD on 2/21/2021 at 8:59 PM     Finalized by (CST): Jono Thomas MD on 2/21/2021 at 9:01 PM

## 2021-02-22 NOTE — H&P
[facility]    PATIENT'S NAME: Adam Billy   ATTENDING PHYSICIAN: Denise Morris MD   PATIENT ACCOUNT#:   [id number]    LOCATION:  [address] [place] [address] RECORD #:   [id number]       YOB: 1945  ADMISSION DATE: hypertension. Sister has a history of breast cancer. SOCIAL HISTORY:  Negative for illicits, tobacco, or alcohol. REVIEW OF SYSTEMS:  A 10-point review of systems has been obtained and is otherwise negative.        PHYSICAL EXAMINATION:    VITAL SI processes. At this time we will continue to monitor the patient. Will consider Cardiology consult. 1.   Lower back pain. Most likely secondary to metastases. The patient currently underwent stereotactic L1 radiosurgery.   Hematology/Oncology is on co

## 2021-02-22 NOTE — ED INITIAL ASSESSMENT (HPI)
Pt coming in today with multiple complaints including. Lower back pain-unresolved with pain medications. Shortness of breath and anxiety.

## 2021-02-22 NOTE — PLAN OF CARE
Vital signs stable. Patient is alert and oriented x4. C/o lower back pain. Pain managed with Dilaudid. Zofran given for nausea. Patient's activity/mobility level is 1/walker. Fall precautions in place and maintained.  Call light within reach, bed locked a Manage/alleviate anxiety  - Utilize distraction and/or relaxation techniques  - Monitor for opioid side effects  - Notify MD/LIP if interventions unsuccessful or patient reports new pain  - Anticipate increased pain with activity and pre-medicate as approp Interventions:  - Assess communication ability and preferred communication style  - Implement communication aides and strategies  - Use visual cues when possible  - Listen attentively, be patient, do not interrupt  - Minimize distractions  - Allow time for

## 2021-02-22 NOTE — CM/SW NOTE
2/22: Received MDO for hospital bed. Patient known to SW, she lives at home w/ her  who is supportive. She is typically independent at baseline. Wheelchair through [address] [place] [address] was ordered earlier this month.      Referral made to [facility] w/ E for a hospita F2F in. Due for 2nd COVID Vaccine 3/4. Addendum 2/25/2021:    Met with patient and , plan for patient to stay w/ her son Zari Mercado at discharge. They are agreeable to hospital bed through [address].  Updated Swapnil woodward/ HERBERT, awar

## 2021-02-22 NOTE — CONSULTS
[address] Patient Status:  Inpatient    1945 MRN [id number]   Location [facility] 4W/SW/SE Attending Dereck Bowman MD   Hosp Day # 1 PCP Jacky Hicks MD     Date stereotactic radiosurgery for the L1 metastases.     Substance History:  Smoking Status: never  Hx of Substance Use/Abuse: none    Allergies:  No Known Allergies    Subjective/Objective: Patient initially seen sitting in the recliner with her  at the ER 30 mg every 12 hours and hasn't missed any doses. She has been taking morphine sulfate IR 30 mg as often as every 3 hours as needed for breakthrough pain (up to 8 tablets in a 24 hour period).  With regard to side effects, she reports sleepiness, dry jayce two tabs PO BID (son has been told that this can be increased up to 4 tablets PO BID) along with scheduled Miralax. I will also add Dulcolax suppository PRN. Krystyna Morrow declined MOM PRN.      There is some shortness of breath which is associated with severe pain dexamethasone (DECADRON) tab 4 mg, 4 mg, Oral, TID  •  gabapentin (NEURONTIN) cap 100 mg, 100 mg, Oral, TID  •  multivitamin (ADULT) tab 1 tablet, 1 tablet, Oral, Daily  •  ondansetron (ZOFRAN-ODT) disintegrating tab 4 mg, 4 mg, Oral, Q8H PRN  •  Osimertin 2/21/2021 at 9:01 PM            Objective:  Vital Signs:  Blood pressure 140/74, pulse 90, temperature 98 °F (36.7 °C), temperature source Oral, resp. rate 17, height 5' (1.524 m), weight 142 lb 6.4 oz (64.6 kg), SpO2 97 %, not currently breastfeeding.   Abigail Diamond likely secondary to metastases  -Start Oxycontin 20 mg PO Q 12 hours  -Start Dilaudid 2 mg tablets PO Q 3 hours PRN breakthrough pain  -Discontinue MS ER 30 mg PO Q 12 hours and MS IR 30 mg PO Q 4 hours PRN  -Start Flector 1.3% patch Q 12 hours to lower ba

## 2021-02-22 NOTE — PLAN OF CARE
Pt is alert/oriented. Anxious. Vitals stable. Tele in place - no calls. Tolerating heart healthy diet. Ambulating well standby. Pain regimen reviewed by palliative care. SCDs for DVT prophylaxis. Voiding adequately. Last BM today 2/22 - loose.  Bed low, loc Consider OT/PT consult to assist with strengthening/mobility  - Encourage toileting schedule  Outcome: Progressing     Problem: DISCHARGE PLANNING  Goal: Discharge to home or other facility with appropriate resources  Description: INTERVENTIONS:  - Identif to optimize hemodynamic stability  - Monitor arterial and/or venous puncture sites for bleeding and/or hematoma  - Assess quality of pulses, skin color and temperature  - Assess for signs of decreased coronary artery perfusion - ex.  Angina  - Evaluate flui

## 2021-02-23 ENCOUNTER — APPOINTMENT (OUTPATIENT)
Dept: CV DIAGNOSTICS | Facility: HOSPITAL | Age: 76
DRG: 543 | End: 2021-02-23
Attending: HOSPITALIST
Payer: MEDICARE

## 2021-02-23 PROCEDURE — 93306 TTE W/DOPPLER COMPLETE: CPT | Performed by: HOSPITALIST

## 2021-02-23 PROCEDURE — 99233 SBSQ HOSP IP/OBS HIGH 50: CPT | Performed by: NURSE PRACTITIONER

## 2021-02-23 PROCEDURE — 99232 SBSQ HOSP IP/OBS MODERATE 35: CPT | Performed by: INTERNAL MEDICINE

## 2021-02-23 RX ORDER — OXYCODONE HCL 10 MG/1
20 TABLET, FILM COATED, EXTENDED RELEASE ORAL EVERY 8 HOURS
Status: DISCONTINUED | OUTPATIENT
Start: 2021-02-23 | End: 2021-02-24

## 2021-02-23 RX ORDER — HYDROMORPHONE HYDROCHLORIDE 4 MG/1
4 TABLET ORAL
Status: DISCONTINUED | OUTPATIENT
Start: 2021-02-23 | End: 2021-02-25

## 2021-02-23 NOTE — PALLIATIVE CARE NOTE
[facility] Patient Status:  Inpatient    1945 MRN [id number]   Location [facility] 4W/SW/SE Attending Augusta Metcalf MD   Hosp Day # 2 PCP Delmy Dominguez MD     Date of Co of Dilaudid 0.5 mg IVP PRN (at 1345 yesterday, 2116 last night, and 0309 am this morning) and three doses of Dilaudid 2 mg PO PRN (at 1539 yesterday, 8895, and then 2335 last night).  She says that she Dilaudid tablets are helping but perhaps her relief cou hospitalizations. Thus, Lenoard Leyden confirms her full code status today. Objective:  Vital Signs:  Blood pressure 148/61, pulse 74, temperature 98.2 °F (36.8 °C), temperature source Oral, resp.  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

## 2021-02-23 NOTE — PROGRESS NOTES
[facility]    Progress Note    Yana Henning Marjorie Christianson Patient Status:  Inpatient    1945 MRN [id number]   Location [facility] 4W/SW/SE Attending Jimmy Wang MD   Hosp Day # 2 PCP Jaime Guy MD        Subjective:   [facility]o [facility] hospital to a chair, wheelchair, or for stand and pivot transfer. Patient requires frequent changes in body position and/or has an immediate need for change in body postion.  Patient is bed bound and is not able to reposition him/her self and needs the bed 01/30/2021 17:14:24 ST abnormality no longer seen Electronically signed on 02/22/2021 at 11:05 by JULIANA Cartwright  2/23/2021

## 2021-02-23 NOTE — PHYSICAL THERAPY NOTE
Chart reviewed pt and consulted with RN who gave approval for PT evaluation. Pt supine in bed when therapy arrived and reporting that tomorrow would be best for evaluation due to fatigue after echo and imaging today.  Pt and spouse endorsed that pt lives in

## 2021-02-23 NOTE — PLAN OF CARE
Patient is alert & oriented x4, can be anxious at times. Room air. Tele in place, no calls received. Pain being managed with PO/IV dilaudid and scheduled OXY ER. Flector patch to lower back. Tolerating cardiac diet. Voiding WNL. Saline locked.  Up with wal influences on pain and pain management  - Manage/alleviate anxiety  - Utilize distraction and/or relaxation techniques  - Monitor for opioid side effects  - Notify MD/LIP if interventions unsuccessful or patient reports new pain  - Anticipate increased saad and participate in their care  Description: Interventions:  - Assess communication ability and preferred communication style  - Implement communication aides and strategies  - Use visual cues when possible  - Listen attentively, be patient, do not interrup

## 2021-02-23 NOTE — CHRONIC PAIN
[facility]  Report of Consultation    Huston Callow Fronie Deter Patient Status:  Inpatient    1945 MRN [id number]   Location [facility] 4W/SW/SE Attending Augusta Metcalf MD   Hosp Day # 2 PCP Delmy Dominguez MD     Date of Admis blue by DOMITILA   • CATARACT EXTRACTION W/  INTRAOCULAR LENS IMPLANT Left 12/3/2012    w/vision blue by DOMITILA   • ELECTROCARDIOGRAM, COMPLETE  4/29/2014    scanned to media tab   • HEMORRHOIDECTOMY  1983   • HYSTERECTOMY  1993   • YAG CAPSULOTOMY - OD - RIGHT EY Diclofenac Epolamine (FLECTOR) 1.3 % patch PTCH 1 patch, 1 patch, Topical, Q12H  •  bisacodyl (DULCOLAX) rectal suppository 10 mg, 10 mg, Rectal, Daily PRN  •  DULoxetine HCl (CYMBALTA) DR particles cap 30 mg, 30 mg, Oral, Daily  •  losartan Potassium (COZ (02/11/2021)  CONCLUSION:   1. There is a hypermetabolic mass involving the superior segment of the left lower lobe, compatible with primary malignancy. 2. Mediastinal lymphadenopathy, presumably metastatic.      3. There is a hypermetabolic focus of ac elements. CORD/CAUDA EQUINA:            Normal caliber, contour, and signal intensity. OTHER:             Negative.      LUMBAR DISC LEVELS:  L1-L2:   Disk desiccation with bulging disk, facet, and ligamentous hypertrophy results in mild central canal n areas of high-grade canal or foraminal narrowing.        Dictated by (CST): Gautam Bradley MD on 2/01/2021 at 10:50 AM       Finalized by (CST): Gautam Bradley MD on 2/01/2021 at 10:57 AM            Impression:   69 yo female with recent diagnosis of metastatic

## 2021-02-24 ENCOUNTER — APPOINTMENT (OUTPATIENT)
Dept: GENERAL RADIOLOGY | Facility: HOSPITAL | Age: 76
DRG: 543 | End: 2021-02-24
Attending: ANESTHESIOLOGY
Payer: MEDICARE

## 2021-02-24 LAB
ANION GAP SERPL CALC-SCNC: 4 MMOL/L (ref 0–18)
BASOPHILS # BLD AUTO: 0.02 X10(3) UL (ref 0–0.2)
BASOPHILS NFR BLD AUTO: 0.2 %
BUN BLD-MCNC: 9 MG/DL (ref 7–18)
BUN/CREAT SERPL: 16.7 (ref 10–20)
CALCIUM BLD-MCNC: 8.8 MG/DL (ref 8.5–10.1)
CHLORIDE SERPL-SCNC: 96 MMOL/L (ref 98–112)
CO2 SERPL-SCNC: 28 MMOL/L (ref 21–32)
CREAT BLD-MCNC: 0.54 MG/DL
DEPRECATED RDW RBC AUTO: 40.8 FL (ref 35.1–46.3)
EOSINOPHIL # BLD AUTO: 0 X10(3) UL (ref 0–0.7)
EOSINOPHIL NFR BLD AUTO: 0 %
ERYTHROCYTE [DISTWIDTH] IN BLOOD BY AUTOMATED COUNT: 14.2 % (ref 11–15)
GLUCOSE BLD-MCNC: 137 MG/DL (ref 70–99)
HCT VFR BLD AUTO: 31.5 %
HGB BLD-MCNC: 10.5 G/DL
IMM GRANULOCYTES # BLD AUTO: 0.16 X10(3) UL (ref 0–1)
IMM GRANULOCYTES NFR BLD: 1.5 %
LYMPHOCYTES # BLD AUTO: 0.32 X10(3) UL (ref 1–4)
LYMPHOCYTES NFR BLD AUTO: 3.1 %
MCH RBC QN AUTO: 26.3 PG (ref 26–34)
MCHC RBC AUTO-ENTMCNC: 33.3 G/DL (ref 31–37)
MCV RBC AUTO: 78.9 FL
MONOCYTES # BLD AUTO: 0.59 X10(3) UL (ref 0.1–1)
MONOCYTES NFR BLD AUTO: 5.7 %
NEUTROPHILS # BLD AUTO: 9.32 X10 (3) UL (ref 1.5–7.7)
NEUTROPHILS # BLD AUTO: 9.32 X10(3) UL (ref 1.5–7.7)
NEUTROPHILS NFR BLD AUTO: 89.5 %
OSMOLALITY SERPL CALC.SUM OF ELEC: 267 MOSM/KG (ref 275–295)
PLATELET # BLD AUTO: 212 10(3)UL (ref 150–450)
POTASSIUM SERPL-SCNC: 4.3 MMOL/L (ref 3.5–5.1)
RBC # BLD AUTO: 3.99 X10(6)UL
SODIUM SERPL-SCNC: 128 MMOL/L (ref 136–145)
WBC # BLD AUTO: 10.4 X10(3) UL (ref 4–11)

## 2021-02-24 PROCEDURE — 99233 SBSQ HOSP IP/OBS HIGH 50: CPT | Performed by: HOSPITALIST

## 2021-02-24 PROCEDURE — 3E0R33Z INTRODUCTION OF ANTI-INFLAMMATORY INTO SPINAL CANAL, PERCUTANEOUS APPROACH: ICD-10-PCS | Performed by: ANESTHESIOLOGY

## 2021-02-24 PROCEDURE — 99232 SBSQ HOSP IP/OBS MODERATE 35: CPT | Performed by: INTERNAL MEDICINE

## 2021-02-24 PROCEDURE — 99233 SBSQ HOSP IP/OBS HIGH 50: CPT | Performed by: NURSE PRACTITIONER

## 2021-02-24 RX ORDER — FENTANYL 25 UG/H
1 PATCH TRANSDERMAL
Status: DISCONTINUED | OUTPATIENT
Start: 2021-02-24 | End: 2021-02-27

## 2021-02-24 RX ORDER — METHYLPREDNISOLONE ACETATE 80 MG/ML
INJECTION, SUSPENSION INTRA-ARTICULAR; INTRALESIONAL; INTRAMUSCULAR; SOFT TISSUE AS NEEDED
Status: DISCONTINUED | OUTPATIENT
Start: 2021-02-24 | End: 2021-02-24 | Stop reason: HOSPADM

## 2021-02-24 RX ORDER — DULOXETIN HYDROCHLORIDE 60 MG/1
60 CAPSULE, DELAYED RELEASE ORAL DAILY
Status: DISCONTINUED | OUTPATIENT
Start: 2021-02-25 | End: 2021-02-25

## 2021-02-24 RX ORDER — LIDOCAINE HYDROCHLORIDE 10 MG/ML
INJECTION, SOLUTION EPIDURAL; INFILTRATION; INTRACAUDAL; PERINEURAL AS NEEDED
Status: DISCONTINUED | OUTPATIENT
Start: 2021-02-24 | End: 2021-02-24 | Stop reason: HOSPADM

## 2021-02-24 RX ORDER — HYDROMORPHONE HYDROCHLORIDE 1 MG/ML
0.5 INJECTION, SOLUTION INTRAMUSCULAR; INTRAVENOUS; SUBCUTANEOUS ONCE
Status: COMPLETED | OUTPATIENT
Start: 2021-02-24 | End: 2021-02-24

## 2021-02-24 NOTE — CHRONIC PAIN
[facility] - [facility]  Inpatient Progress Note     Elson Gosselin Burnetta Galt Patient Status:  Inpatient    1945 MRN [id number]   Location [facility] 4W/SW/SE Attending Susan Keys MD   Hosp Day # 3 PCP Audree Mohs, MD     Date of Adm Facility-Administered Medications:   •  oxyCODONE HCl ER (OXYCONTIN) 12 hr tab 20 mg, 20 mg, Oral, Q8H  •  HYDROmorphone HCl (DILAUDID) tab 4 mg, 4 mg, Oral, Q3H PRN  •  ondansetron HCl (ZOFRAN) injection 4 mg, 4 mg, Intravenous, Q6H PRN  •  HYDROmorphone JVD  Chest: S1, S2, RRR, no obvious visual deformity  Respiratory: CTAB  Abdomen: soft, non-tender, bowel sounds present,   Neuro: ambulating with walker, non antalgic, normal motor strength and sensation sonal upper and lower extr    Laboratory Data:  Lab R possibility of underlying anemia. Correlate with hematologic parameters. 6. Lesser incidental findings as above. Remote.         Dictated by (CST): Kelli Mendoza MD on 2/12/2021 at 11:38 AM       Finalized by (CST): Kelli Mendoza MD on 2/12/2021 with mass effect upon the exiting left L1 nerve root. There is resulting severe narrowing of the left L1 neural foramen. This is   best seen on series 5, image 2 and series 2, image 12.    L2-L3:   Mild hypertrophic changes of the facet joints and ligamen steroid injection scheduled for today afternoon     Thank you for allowing me to participate in the care of your patient.         Time spent 12 minutes       Dino Garland,   Anesthesiology  Pain Medicine

## 2021-02-24 NOTE — OCCUPATIONAL THERAPY NOTE
Communication Note to Team    Acknowledge & appreciate OT order. Chart Reviewed: Pt presented 2/21 with LB pain, dx of Malignant neoplasm of lower lobe of left lung. Pt reports pain 8/10 upon arrival; just returned from restroom.  Pt reports she has an

## 2021-02-24 NOTE — PROGRESS NOTES
[facility]    Progress Note    Parker Degroot Patient Status:  Inpatient    1945 MRN [id number]   Location [facility] 4W/SW/SE Attending Alicia Stewart MD   Hosp Day # 2 PCP Meredith Burch MD        Subjective:   Gerald Irizarry 2) Cancer related pain at L1. Palliative care evaluated the patient. Now on OxyContin and prn dilaudid. Added duloxetine 30mg po daily x 3 days, then titrate to 60mg po daily to aid with back pain, as well as depression.    Pain service was consulted and ECG Report  Interpretation  -------------------------- Sinus Rhythm -Anteroseptal infarct -age undetermined.  ABNORMAL When compared with ECG of 02/21/2021 20:00:36 No significant changes have occurred Electronically signed on 02/22/2021 at 12:25 by Maggi Resendiz,

## 2021-02-24 NOTE — PLAN OF CARE
Patient alert and oriented, on room air. Patient states pain, palliative and pain services on. Medications adjusted, Prn and scheduled given. Ambulating with 1 assist and walker. Continent of bowel and bladder. Tolerating cardiac diet.  Echo done, paperwork response  - Consider cultural and social influences on pain and pain management  - Manage/alleviate anxiety  - Utilize distraction and/or relaxation techniques  - Monitor for opioid side effects  - Notify MD/LIP if interventions unsuccessful or patient rep Barrier  Goal: Patient/Family is able to understand and participate in their care  Description: Interventions:  - Assess communication ability and preferred communication style  - Implement communication aides and strategies  - Use visual cues when possibl

## 2021-02-24 NOTE — HOME CARE LIAISON
Received referral from [address]. Spoke to pt's spouse/ Olvin Butts about hh services. Pt's spouse  would like more time to think over his decision and requested a follow up call on 2/24. Quality Data offered: PT's spouse/ Keelyraya Butts declined at this time.

## 2021-02-24 NOTE — PROGRESS NOTES
[facility]    Progress Note    Kristen Farley Patient Status:  Inpatient    1945 MRN [id number]   Location [facility] 4W/SW/SE Attending Kylee Andujar MD   Hosp Day # 3 PCP Irma Sanchez MD        Subjective:   Flaco Grewal (H) 02/24/2021    CA 8.8 02/24/2021    ALB 2.7 (L) 02/22/2021    ALKPHO 72 02/22/2021    BILT 0.5 02/22/2021    TP 6.0 (L) 02/22/2021    AST 17 02/22/2021    ALT 32 02/22/2021    PTT 25.8 01/31/2021    INR 0.99 01/31/2021    TSH 1.22 06/28/2016    MG 2.6 0

## 2021-02-24 NOTE — DIETARY NOTE
ADULT NUTRITION INITIAL ASSESSMENT    Pt is at moderate nutrition risk. Pt does not meet malnutrition criteria at this time.       RECOMMENDATIONS TO MD:  See Nutrition Intervention  Liberalized diet to 3-4 g sodium vs cardiac (pt has both menus in room hemorrhoidectomy (1983), HTN (hypertension), Lipid screening (4/29/2014), Ophthalmological disorder (2007), and Vitreous floaters (2007).      ANTHROPOMETRICS:  HT: 152.4 cm (5')  WT: 64.6 kg (142 lb 6.4 oz) --true wt masked by edema (138-140# estimated)--u 0.50* 0.54*  --  0.54*   CA 9.1 8.6  --  8.8   MG  --   --  2.6  --    * 130*  --  128*   K 3.6  --  4.3 4.3   CL 94* 97*  --  96*   CO2 32.0 31.0  --  28.0   PHOS  --  2.6  --   --    OSMOCALC 272* 270*  --  267*       NUTRITION RELATED PHYSICAL FIN

## 2021-02-24 NOTE — HOME CARE LIAISON
Received referral from [address]. Pt is out of Residential service area. LASHAY Monteiro notified.

## 2021-02-24 NOTE — PALLIATIVE CARE NOTE
[facility] Patient Status:  Inpatient    1945 MRN [id number]   Location [facility] 4W/SW/SE Attending Bin Whitley MD   Hosp Day # 3 PCP Edwina Jordan MD     Date of Co GILL, in the [facility]. Objective:  Vital Signs:  Blood pressure 144/81, pulse 77, temperature 98.1 °F (36.7 °C), temperature source Oral, resp. rate 18, height 5' (1.524 m), weight 142 lb 6.4 oz (64.6 kg), SpO2 99 %, not currently breastfeeding.   B powder packet 17 g, 17 g, Oral, Daily  •  Diclofenac Epolamine (FLECTOR) 1.3 % patch PTCH 1 patch, 1 patch, Topical, Q12H  •  bisacodyl (DULCOLAX) rectal suppository 10 mg, 10 mg, Rectal, Daily PRN  •  DULoxetine HCl (CYMBALTA) DR particles cap 30 mg, 30 m daily  -Continue Dulcolax suppository daily PRN       Goals of care counseling/discussion  -Full code; no limits set at this time  -Continue supportive medical treatments  -Pt will continue to follow with GILL Palm, in the [facility] for her o

## 2021-02-24 NOTE — OPERATIVE REPORT
Patient: Juan F Mas    : 1945        Operative Report        Date of procedure: 2021    Preoperative diagnosis: Back pain without radiation  (primary encounter diagnosis)  Hyponatremia with L1 metastatic lesion with intra

## 2021-02-24 NOTE — PLAN OF CARE
Pt is A&Ox4, VSS on room air. Complaining of severe back pain, controlled with PRN IV and PO dilaudid, scheduled oxy ER, and heat packs. Plan is to switch the oxy ER to a fentanyl patch this afternoon. Saline locked.  Tolerating general diet, little to no a pain scale  - Administer analgesics based on type and severity of pain and evaluate response  - Implement non-pharmacological measures as appropriate and evaluate response  - Consider cultural and social influences on pain and pain management  - Manage/all complex needs related to functional status, cognitive ability or social support system  Outcome: Progressing     Problem: Altered Communication/Language Barrier  Goal: Patient/Family is able to understand and participate in their care  Description: Rocky Monroy arrhythmias  - Monitor electrolytes and administer replacement therapy as ordered  Outcome: Progressing

## 2021-02-24 NOTE — PLAN OF CARE
Patient is A&Ox4, anxious at times. Room air. Tele in place, no calls received. Saline locked. Pain being managed with scheduled OXY ER, prn IV dilaudid and prn PO dilaudid. Tolerating cardiac diet. Voiding WNL. Up with walker & SBA.  Plan for epidural toda distraction and/or relaxation techniques  - Monitor for opioid side effects  - Notify MD/LIP if interventions unsuccessful or patient reports new pain  - Anticipate increased pain with activity and pre-medicate as appropriate  Outcome: Progressing     Prob communication ability and preferred communication style  - Implement communication aides and strategies  - Use visual cues when possible  - Listen attentively, be patient, do not interrupt  - Minimize distractions  - Allow time for understanding and respon

## 2021-02-24 NOTE — PHYSICAL THERAPY NOTE
PT evaluation orders received and chart reviewed. RN approved activity- communicated that pt just returned to bed. Patient received supine in bed. Therapist educated patient on physiological benefits of mobilization.  Patient declined all activity due to pa

## 2021-02-25 ENCOUNTER — TELEPHONE (OUTPATIENT)
Dept: HEMATOLOGY/ONCOLOGY | Facility: HOSPITAL | Age: 76
End: 2021-02-25

## 2021-02-25 DIAGNOSIS — C34.32 MALIGNANT NEOPLASM OF LOWER LOBE OF LEFT LUNG (HCC): ICD-10-CM

## 2021-02-25 DIAGNOSIS — C79.31 BRAIN METASTASIS (HCC): ICD-10-CM

## 2021-02-25 LAB
ALBUMIN SERPL-MCNC: 3.1 G/DL (ref 3.4–5)
ALBUMIN/GLOB SERPL: 1 {RATIO} (ref 1–2)
ALP LIVER SERPL-CCNC: 74 U/L
ALT SERPL-CCNC: 32 U/L
ANION GAP SERPL CALC-SCNC: 5 MMOL/L (ref 0–18)
AST SERPL-CCNC: 11 U/L (ref 15–37)
BASOPHILS # BLD AUTO: 0.01 X10(3) UL (ref 0–0.2)
BASOPHILS NFR BLD AUTO: 0.1 %
BILIRUB SERPL-MCNC: 0.4 MG/DL (ref 0.1–2)
BILIRUB UR QL: NEGATIVE
BUN BLD-MCNC: 12 MG/DL (ref 7–18)
BUN/CREAT SERPL: 26.1 (ref 10–20)
CALCIUM BLD-MCNC: 8.9 MG/DL (ref 8.5–10.1)
CHLORIDE SERPL-SCNC: 95 MMOL/L (ref 98–112)
CLARITY UR: CLEAR
CO2 SERPL-SCNC: 27 MMOL/L (ref 21–32)
COLOR UR: YELLOW
CREAT BLD-MCNC: 0.46 MG/DL
DEPRECATED RDW RBC AUTO: 41.1 FL (ref 35.1–46.3)
EOSINOPHIL # BLD AUTO: 0 X10(3) UL (ref 0–0.7)
EOSINOPHIL NFR BLD AUTO: 0 %
ERYTHROCYTE [DISTWIDTH] IN BLOOD BY AUTOMATED COUNT: 14.3 % (ref 11–15)
GLOBULIN PLAS-MCNC: 3 G/DL (ref 2.8–4.4)
GLUCOSE BLD-MCNC: 107 MG/DL (ref 70–99)
GLUCOSE UR-MCNC: NEGATIVE MG/DL
HCT VFR BLD AUTO: 32.8 %
HGB BLD-MCNC: 10.8 G/DL
HGB UR QL STRIP.AUTO: NEGATIVE
IMM GRANULOCYTES # BLD AUTO: 0.14 X10(3) UL (ref 0–1)
IMM GRANULOCYTES NFR BLD: 1.4 %
KETONES UR-MCNC: NEGATIVE MG/DL
LYMPHOCYTES # BLD AUTO: 0.54 X10(3) UL (ref 1–4)
LYMPHOCYTES NFR BLD AUTO: 5.2 %
M PROTEIN MFR SERPL ELPH: 6.1 G/DL (ref 6.4–8.2)
MCH RBC QN AUTO: 26 PG (ref 26–34)
MCHC RBC AUTO-ENTMCNC: 32.9 G/DL (ref 31–37)
MCV RBC AUTO: 79 FL
MONOCYTES # BLD AUTO: 0.81 X10(3) UL (ref 0.1–1)
MONOCYTES NFR BLD AUTO: 7.8 %
NEUTROPHILS # BLD AUTO: 8.86 X10 (3) UL (ref 1.5–7.7)
NEUTROPHILS # BLD AUTO: 8.86 X10(3) UL (ref 1.5–7.7)
NEUTROPHILS NFR BLD AUTO: 85.5 %
NITRITE UR QL STRIP.AUTO: NEGATIVE
OSMOLALITY SERPL CALC.SUM OF ELEC: 264 MOSM/KG (ref 275–295)
OSMOLALITY UR: 564 MOSM/KG (ref 300–1100)
PH UR: 6 [PH] (ref 5–8)
PLATELET # BLD AUTO: 231 10(3)UL (ref 150–450)
POTASSIUM SERPL-SCNC: 4 MMOL/L (ref 3.5–5.1)
PROT UR-MCNC: NEGATIVE MG/DL
RBC # BLD AUTO: 4.15 X10(6)UL
SODIUM SERPL-SCNC: 127 MMOL/L (ref 136–145)
SP GR UR STRIP: 1.02 (ref 1–1.03)
UROBILINOGEN UR STRIP-ACNC: <2
WBC # BLD AUTO: 10.4 X10(3) UL (ref 4–11)

## 2021-02-25 PROCEDURE — 99232 SBSQ HOSP IP/OBS MODERATE 35: CPT | Performed by: INTERNAL MEDICINE

## 2021-02-25 PROCEDURE — 99233 SBSQ HOSP IP/OBS HIGH 50: CPT | Performed by: NURSE PRACTITIONER

## 2021-02-25 PROCEDURE — 99232 SBSQ HOSP IP/OBS MODERATE 35: CPT | Performed by: NURSE PRACTITIONER

## 2021-02-25 RX ORDER — HYDROMORPHONE HYDROCHLORIDE 4 MG/1
8 TABLET ORAL
Status: DISCONTINUED | OUTPATIENT
Start: 2021-02-25 | End: 2021-02-27

## 2021-02-25 RX ORDER — TOLVAPTAN 15 MG/1
15 TABLET ORAL ONCE
Status: COMPLETED | OUTPATIENT
Start: 2021-02-25 | End: 2021-02-25

## 2021-02-25 RX ORDER — SENNA AND DOCUSATE SODIUM 50; 8.6 MG/1; MG/1
2 TABLET, FILM COATED ORAL 3 TIMES DAILY
Status: DISCONTINUED | OUTPATIENT
Start: 2021-02-25 | End: 2021-02-27

## 2021-02-25 RX ORDER — MIRTAZAPINE 15 MG/1
15 TABLET, FILM COATED ORAL NIGHTLY
Status: DISCONTINUED | OUTPATIENT
Start: 2021-02-25 | End: 2021-02-27

## 2021-02-25 NOTE — TELEPHONE ENCOUNTER
[facility] inform Dr. Joey Kelsey that the pharmacy does not have access to [address] [place] [address]. It is available at either [facility] or [facility].     [facility]- [phone]    Rachel Bailon- [phone]

## 2021-02-25 NOTE — PALLIATIVE CARE NOTE
70 [facility] Patient Status:  Inpatient    1945 MRN [id number]   Location [facility] 4W/SW/SE Attending Yuval Neal MD   Hosp Day # 4 PCP Hoda Childress MD     Date of Co increase her Senokot-S to [place] tabs TID. \" I discussed that at home, she should skip doses and/or temporarily stop the bowel medications in the event that stools are too loose and/or too frequent.      Goals of Care counseling and discussion/[facility] P 1 MG/ML injection 0.5 mg, 0.5 mg, Intravenous, Q3H PRN  •  dexamethasone (DECADRON) tab 4 mg, 4 mg, Oral, TID  •  gabapentin (NEURONTIN) cap 100 mg, 100 mg, Oral, TID  •  multivitamin (ADULT) tab 1 tablet, 1 tablet, Oral, Daily  •  ondansetron (ZOFRAN-ODT) Palliative Care Assessment/Plan:    Lower back pain most likely secondary to metastases  -Continue Fentanyl patch 25 mcg/hour TD Q 72 hours (started on 2/24/2021)  -Increase Dilaudid tablets to 8 mg PO Q 3 hours PRN breakthrough pain   -Continue Fle

## 2021-02-25 NOTE — PROGRESS NOTES
[facility]    Progress Note    Elo Weir Felix Mcdonnell Patient Status:  Inpatient    1945 MRN [id number]   Location [facility] 4W/SW/SE Attending Josué Guevara MD   Hosp Day # 3 PCP Anibal Burger MD        Subjective:   Ryan Michael D/w patient that her pain will improve with time due to RT, as well as when treatment is started and she has denosumab.      2) Cancer related pain at L1. Palliative care evaluated the patient. Now on fentanyl and prn dilaudid.   Added duloxetine 30mg po

## 2021-02-25 NOTE — OCCUPATIONAL THERAPY NOTE
OCCUPATIONAL THERAPY EVALUATION - INPATIENT     Room Number: 462/462-A  Evaluation Date: 2/25/2021  Type of Evaluation: Initial  Presenting Problem: (back pain)    Physician Order: IP Consult to Occupational Therapy  Reason for Therapy: ADL/IADL Dysfunctio Research supports that patients with this level of impairment may benefit from [facility] and 24 hour care.      DISCHARGE RECOMMENDATIONS  OT Discharge Recommendations: Home;24 hour care/supervision       PLAN  OT Treatment Plan: Energy conservation/work simplif (condo) and was managing BADls and household mobility independently    SUBJECTIVE  \"Just be patient with me\"  \"I wasn't to do it myself\"    OCCUPATIONAL THERAPY EXAMINATION      OBJECTIVE  Precautions: Spine  Fall Risk: Standard fall risk    PAIN ASSES eventually    Pt will perform sponge bath sitting at sink with set up and SBA1x  Comment:      Comment:    Pt will perform toileting and toilet xfer modified independent 1x  Comment:     Comment:          Goals  on: 3/3/21  Frequency: 3-5x/week

## 2021-02-25 NOTE — CONSULTS
[facility]    Report of Consultation    Kennedi Hale Patient Status:  Inpatient    1945 MRN [id number]   Location [facility] 4W/SW/SE Attending Virginia Bryant MD   Hosp Day # 4 PCP Tiago Kruse MD     Date of Adm of Onset   • Hypertension Mother    • Lipids Mother         hyperlipidemia   • Glaucoma Mother    • Hypertension Father    • Heart Disease Father    • Stroke Father 64        (cause of death)   • Breast Cancer Sister 62        (casue of death)   • Hyperten mouth daily. •  gabapentin 100 MG Oral Cap, Take 1 capsule (100 mg total) by mouth 3 (three) times daily. •  morphINE Sulfate ER 30 MG Oral Tab CR, Take 1 tablet (30 mg total) by mouth every 12 (twelve) hours.     •  Senna-Docusate Sodium 8.6-50 MG Or Wt Readings from Last 1 Encounters:  02/21/21 : 142 lb 6.4 oz (64.6 kg)      Exam  Gen: No acute distress  Heent: NC AT, mucous memb clear, neck supple  Pulm: Lungs clear, normal respiratory effort  CV: Heart with regular rate and rhythm, edema  Abd: A

## 2021-02-25 NOTE — SPIRITUAL CARE NOTE
Pt was sitting in her couch, accompanied by her .  talked about pt's recent diagnosis and coping with cancer, Pt's family and friends has been very supportive and praying for her. Pt has strong Alevism aditya for decades.   provided pr done

## 2021-02-25 NOTE — CHRONIC PAIN
[facility]  Inpatient Pain Management Progress Note      Patient name: Margaux Estevez 68year old female  : 1945  MRN: [id number]    Diagnosis: Back pain without radiation  (primary encounter diagnosis)  Hyponatremia    Reason alcohol.     Allergies:  No Known Allergies    Current Medications:  Scheduled Meds:  • fentaNYL  1 patch Transdermal Q72H   • DULoxetine HCl  60 mg Oral Daily   • dexamethasone  4 mg Oral TID   • gabapentin  100 mg Oral TID   • multivitamin  1 tablet Oral time was spent with counseling (nature of discussion centered around pain, therapy, and treatment options), face to face time, time spent reviewing data, obtaining patient information and discussing the care with the patients health care providers.      Chr

## 2021-02-25 NOTE — PLAN OF CARE
Problem: Patient Centered Care  Goal: Patient preferences are identified and integrated in the patient's plan of care  Description: Interventions:  - What would you like us to know as we care for you?  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

## 2021-02-25 NOTE — TELEPHONE ENCOUNTER
Spoke to , and informed him that shower bench is not a covered item by insurance. He understands. He states he will order from [employer].

## 2021-02-25 NOTE — TELEPHONE ENCOUNTER
[address] [address] [facility]  2/23 ? 1:11PM  Good afternoon Digna. Unfortunately the patient's insurance does not cover the Transfer Bench. If she wanted to proceed it would need to be cash sale. sorry about that.

## 2021-02-25 NOTE — PROGRESS NOTES
[facility]    Progress Note    Maranda Osorio Patient Status:  Inpatient    1945 MRN [id number]   Location [facility] 4W/SW/SE Attending Dereck Bowman MD   Hosp Day # 4 PCP Jacky Hicks MD        Subjective:   Yun Lax D/w patient that her pain will improve with time due to RT, as well as when treatment is started and she has denosumab.      2) Cancer related pain at L1. Palliative care evaluated the patient. Now on fentanyl and prn dilaudid.   Added duloxetine 30mg po

## 2021-02-25 NOTE — PLAN OF CARE
Pt is A&Ox4, VSS on room air. Complaining of severe back pain, controlled with PRN PO dilaudid and fentanyl patch. Saline locked. Tolerating general diet, little to no appetite, no n/v. Voiding freely, having bowel movements. Up with SBA and the walker.  Liz Lopez evaluate response  - Implement non-pharmacological measures as appropriate and evaluate response  - Consider cultural and social influences on pain and pain management  - Manage/alleviate anxiety  - Utilize distraction and/or relaxation techniques  - Monit support system  Outcome: Progressing     Problem: Altered Communication/Language Barrier  Goal: Patient/Family is able to understand and participate in their care  Description: Interventions:  - Assess communication ability and preferred communication styl ordered  Outcome: Progressing

## 2021-02-26 ENCOUNTER — TELEPHONE (OUTPATIENT)
Dept: HEMATOLOGY/ONCOLOGY | Facility: HOSPITAL | Age: 76
End: 2021-02-26

## 2021-02-26 DIAGNOSIS — C79.31 BRAIN METASTASIS (HCC): ICD-10-CM

## 2021-02-26 DIAGNOSIS — C34.32 MALIGNANT NEOPLASM OF LOWER LOBE OF LEFT LUNG (HCC): ICD-10-CM

## 2021-02-26 LAB
ALBUMIN SERPL-MCNC: 3.1 G/DL (ref 3.4–5)
ANION GAP SERPL CALC-SCNC: 3 MMOL/L (ref 0–18)
BUN BLD-MCNC: 20 MG/DL (ref 7–18)
BUN/CREAT SERPL: 32.3 (ref 10–20)
CALCIUM BLD-MCNC: 9.4 MG/DL (ref 8.5–10.1)
CHLORIDE SERPL-SCNC: 104 MMOL/L (ref 98–112)
CO2 SERPL-SCNC: 31 MMOL/L (ref 21–32)
CREAT BLD-MCNC: 0.62 MG/DL
GLUCOSE BLD-MCNC: 97 MG/DL (ref 70–99)
OSMOLALITY SERPL CALC.SUM OF ELEC: 289 MOSM/KG (ref 275–295)
PHOSPHATE SERPL-MCNC: 3.2 MG/DL (ref 2.5–4.9)
POTASSIUM SERPL-SCNC: 4.2 MMOL/L (ref 3.5–5.1)
SODIUM SERPL-SCNC: 138 MMOL/L (ref 136–145)
TSI SER-ACNC: 0.17 MIU/ML (ref 0.36–3.74)

## 2021-02-26 PROCEDURE — 99233 SBSQ HOSP IP/OBS HIGH 50: CPT | Performed by: NURSE PRACTITIONER

## 2021-02-26 PROCEDURE — 99232 SBSQ HOSP IP/OBS MODERATE 35: CPT | Performed by: INTERNAL MEDICINE

## 2021-02-26 RX ORDER — FENTANYL 25 UG/H
1 PATCH TRANSDERMAL
Qty: 10 PATCH | Refills: 0 | Status: SHIPPED | OUTPATIENT
Start: 2021-02-27 | End: 2021-04-06 | Stop reason: DRUGHIGH

## 2021-02-26 RX ORDER — HYDROMORPHONE HYDROCHLORIDE 8 MG/1
8 TABLET ORAL
Qty: 90 TABLET | Refills: 0 | Status: SHIPPED | OUTPATIENT
Start: 2021-02-26 | End: 2021-02-26

## 2021-02-26 RX ORDER — HYDROMORPHONE HYDROCHLORIDE 4 MG/1
8 TABLET ORAL
Qty: 120 TABLET | Refills: 0 | Status: SHIPPED | OUTPATIENT
Start: 2021-02-26 | End: 2021-03-29

## 2021-02-26 NOTE — PROGRESS NOTES
[place] [facility] - [facility]    Progress Note    Elson Gosselin Burnetta Galt Patient Status:  Inpatient    1945 MRN [id number]   Location [facility] 4W/SW/SE Attending Susan Keys MD   Hosp Day # 5 PCP Audree Mohs, MD        Subjective:   Jose Rodas 2) Cancer related pain at L1. Palliative care evaluated the patient. Now on fentanyl and prn dilaudid. Pain service was consulted and Dr. Terri Gurrola performed epidural 02/24/21. 3) Depression:   On remeron 15mg at bedtime, may titrate as needed.     Than

## 2021-02-26 NOTE — HOME CARE LIAISON
Home Health update:     Pt's son/Kain is agreeable w/ [facility]. Will continue to follow.

## 2021-02-26 NOTE — PROGRESS NOTES
[facility]    Progress Note    Dino Avalosshannan Yann Patient Status:  Inpatient    1945 MRN [id number]   Location [facility] 4W/SW/SE Attending Maxine Quan MD   Hosp Day # 5 PCP Araceli Juan MD        Subjective:   Montse Whatley possible discharge tomorrow if pain is controlled   sw assisting with discharge planning   D/w son over the phone. All questions answered.              Results:     Lab Results   Component Value Date    WBC 10.4 02/25/2021    HGB 10.8 (L) 02/25/2021    HCT

## 2021-02-26 NOTE — PLAN OF CARE
Patient alert and oriented. Pain consistent, reports PO dilaudid not as strong as IV, encouraged and educated on PO dilaudid use vs IV, fentanyl patch to RLQ, flector patch to lower back. Denies nausea, tolerating diet. Voiding. Ambulating well.  Call light precautions as indicated by assessment.  - Educate pt/family on patient safety including physical limitations  - Instruct pt to call for assistance with activity based on assessment  - Modify environment to reduce risk of injury  - Provide assistive device ANXIETY  Goal: Will report anxiety at manageable levels  Description: INTERVENTIONS:  - Administer medication as ordered  - Teach and rehearse alternative coping skills  - Provide emotional support with 1:1 interaction with staff  Outcome: Progressing

## 2021-02-26 NOTE — PROGRESS NOTES
[facility]    Progress Note    Kennedi Adela Ashely Hale Patient Status:  Inpatient    1945 MRN [id number]   Location [facility] 4W/SW/SE Attending Virginia Bryant MD   Hosp Day # 5 PCP Tiago Kruse MD       Subjective:   Julissa Barrow Recent Labs   Lab 02/24/21  0654 02/25/21  0658 02/26/21  0718   * 107* 97   BUN 9 12 20*   CREATSERUM 0.54* 0.46* 0.62   GFRAA 106 112 101   GFRNAA 92 97 88   CA 8.8 8.9 9.4   * 127* 138   K 4.3 4.0 4.2   CL 96* 95* 104   CO2 28.0 27.

## 2021-02-26 NOTE — PALLIATIVE CARE NOTE
[facility] Patient Status:  Inpatient    1945 MRN [id number]   Location [facility] 4W/SW/SE Attending Alicia Stewart MD   Hosp Day # 5 PCP Meredith Burch MD     Date of Co have the following medications and quantities: Fentanyl 25 mcg/hour patches #9 (they did not have 10 patches only 9) and Dilaudid 4 mg tablets #120 (thus I wrote script for two tablets PO Q 3 hours PRN since they did not have the 8 mg tablets).  Pharmacy te MCG/HR 1 patch, 1 patch, Transdermal, Q72H  •  ondansetron HCl (ZOFRAN) injection 4 mg, 4 mg, Intravenous, Q6H PRN  •  HYDROmorphone HCl (DILAUDID) 1 MG/ML injection 0.3 mg, 0.3 mg, Intravenous, Q3H PRN  •  HYDROmorphone HCl (DILAUDID) 1 MG/ML injection 0. 02/25/2021    ALT 32 02/25/2021    MG 2.6 02/22/2021    PHOS 3.2 02/26/2021    TROP <0.045 02/21/2021       Imaging:  [facility] Fluoroscopy - N/c    Result Date: 2/24/2021  CONCLUSION:  1. 5.9 seconds of fluoroscopy was utilized.     Dictated by (CST): Dulcolax suppository daily PRN       Goals of care counseling/discussion  -Full code; no limits set at this time  -Continue supportive medical treatments  -Pt will continue to follow with GILL Geller, in the [facility] for her outpatient palliat

## 2021-02-26 NOTE — TELEPHONE ENCOUNTER
[facility] phone#  [phone] Fax # [phone] called and confirmed they carry Osmertinib for Dr. Kelvin Kang to send script.

## 2021-02-26 NOTE — TELEPHONE ENCOUNTER
Parker Barraza calling not able to bring Ryley Solorzano to her afternoon appt on 3/3 because she is going for her second covid vaccine shot. Can I use one of the pre chemo slots?  Thank you Jessica Roberson

## 2021-02-26 NOTE — SPIRITUAL CARE NOTE
Pt said she was in pain and tired, not sure how to move forward with her disease, she entrusted everything to her  and son.  provided encouragement.   Geni Gonzalez, [address] [place] [address] Road resident, ext [phone]

## 2021-02-26 NOTE — PLAN OF CARE
Problem: Patient Centered Care  Goal: Patient preferences are identified and integrated in the patient's plan of care  Description: Interventions:  - What would you like us to know as we care for you?  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 Patient will be okay to discharge tomorrow home with son.

## 2021-02-26 NOTE — PHYSICAL THERAPY NOTE
PHYSICAL THERAPY TREATMENT NOTE - INPATIENT     Room Number: 462/462-A       Presenting Problem: Back pain s/p epidural injection    Problem List  Principal Problem:    Back pain without radiation  Active Problems:    Hyponatremia    Metastasis to vertebra Static Sitting: Good  Dynamic Sitting: Fair +           Static Standing: Fair  Dynamic Standing: Fair -    ACTIVITY TOLERANCE                         O2 WALK                  AM-PAC '6-Clicks' INPATIENT SHORT FORM - BASIC MOBILITY assistance level: supervision   Goal #3   Current Status 50ft x3, 100ft x1with RW with Min A to CGA   Goal #4 Patient will negotiate 2 stairs/one curb w/ assistive device and supervision   Goal #4   Current Status NT   Goal #5 Patient to demonstrate indepe

## 2021-02-27 VITALS
TEMPERATURE: 98 F | BODY MASS INDEX: 27.95 KG/M2 | DIASTOLIC BLOOD PRESSURE: 68 MMHG | SYSTOLIC BLOOD PRESSURE: 150 MMHG | WEIGHT: 142.38 LBS | HEIGHT: 60 IN | RESPIRATION RATE: 18 BRPM | HEART RATE: 90 BPM | OXYGEN SATURATION: 96 %

## 2021-02-27 LAB
ANION GAP SERPL CALC-SCNC: 4 MMOL/L (ref 0–18)
BUN BLD-MCNC: 18 MG/DL (ref 7–18)
BUN/CREAT SERPL: 36 (ref 10–20)
CALCIUM BLD-MCNC: 8.8 MG/DL (ref 8.5–10.1)
CHLORIDE SERPL-SCNC: 98 MMOL/L (ref 98–112)
CO2 SERPL-SCNC: 32 MMOL/L (ref 21–32)
CREAT BLD-MCNC: 0.5 MG/DL
GLUCOSE BLD-MCNC: 118 MG/DL (ref 70–99)
OSMOLALITY SERPL CALC.SUM OF ELEC: 281 MOSM/KG (ref 275–295)
POTASSIUM SERPL-SCNC: 4.4 MMOL/L (ref 3.5–5.1)
SODIUM SERPL-SCNC: 134 MMOL/L (ref 136–145)

## 2021-02-27 PROCEDURE — 99239 HOSP IP/OBS DSCHRG MGMT >30: CPT | Performed by: HOSPITALIST

## 2021-02-27 RX ORDER — LOSARTAN POTASSIUM 50 MG/1
25 TABLET ORAL DAILY
Qty: 30 TABLET | Refills: 3 | Status: SHIPPED | COMMUNITY
Start: 2021-02-27 | End: 2021-05-10

## 2021-02-27 RX ORDER — POLYETHYLENE GLYCOL 3350 17 G/17G
17 POWDER, FOR SOLUTION ORAL DAILY
Refills: 0 | Status: SHIPPED | COMMUNITY
Start: 2021-02-28

## 2021-02-27 RX ORDER — MIRTAZAPINE 15 MG/1
15 TABLET, FILM COATED ORAL NIGHTLY
Qty: 30 TABLET | Refills: 0 | Status: SHIPPED | OUTPATIENT
Start: 2021-02-27 | End: 2021-04-14

## 2021-02-27 RX ORDER — SENNA AND DOCUSATE SODIUM 50; 8.6 MG/1; MG/1
2 TABLET, FILM COATED ORAL 3 TIMES DAILY
Qty: 120 TABLET | Refills: 3 | Status: SHIPPED | COMMUNITY
Start: 2021-02-27 | End: 2021-07-27

## 2021-02-27 NOTE — CM/SW NOTE
LASHAY received message from RN who states pt is requesting a wheelchair at [place]. LASHAY performed chart review, per Hugh Mann, pt had a wheelchair delivered earlier this year.  Per Channing Oakley note on 2/9 pt requested a wheelchair however once order was confirmed

## 2021-02-27 NOTE — DISCHARGE SUMMARY
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 Continue taking this medication, and follow the directions you see here. Take 1 tablet (4 mg total) by mouth 3 (three) times daily.    Stop taking on: April 9 Where to Get Your Medications      These medications were sent to [facility], [address] to Registered [facility] [phone], [facility], [address] [place] [address]    Phone: [phone] This information will be useful to you if you have been prescribed fentanyl patches for pain relief. It describes how to use them properly and answers some questions you may have when using them for the first time     What is a fentanyl patch?   Fentanyl i • When you use fentanyl patches for the first time, or if your dose is increased, it can take a day or more for you to feel the maximum benefit.    • You may experience pain while doing a particular activity despite being on the patch e.g. washing, walking Seek medical advice if you develop a temperature. If a patch is accidently transferred to another adult or a child, remove it immediately and seek medical advice.   Store patches safely and out of sight and reach of children    Different brands of patch  T

## 2021-02-27 NOTE — PROGRESS NOTES
[facility]    Progress Note    Marylin Malhotra Patient Status:  Inpatient    1945 MRN [id number]   Location [facility] 4W/SW/SE Attending Ezio Oconnell MD   Hosp Day # 6 PCP Heaven Delcid MD       Subjective:   Anastacia Rivera Lab 02/24/21  0654 02/25/21  0658 02/26/21  0718   * 107* 97   BUN 9 12 20*   CREATSERUM 0.54* 0.46* 0.62   GFRAA 106 112 101   GFRNAA 92 97 88   CA 8.8 8.9 9.4   * 127* 138   K 4.3 4.0 4.2   CL 96* 95* 104   CO2 28.0 27.0 31.0          No r

## 2021-02-27 NOTE — PLAN OF CARE
Pt is A&Ox4, VSS on room air. Pain controlled with fentanyl patch and PRN PO dilaudid. Tolerating diet, no n/v. Voiding freely. Multiple bowel movements today. Saline locked. Up ad clay with a walker.  Call light within reach,  at bedside, plan is to non-pharmacological measures as appropriate and evaluate response  - Consider cultural and social influences on pain and pain management  - Manage/alleviate anxiety  - Utilize distraction and/or relaxation techniques  - Monitor for opioid side effects  - N system  Outcome: Adequate for Discharge     Problem: Altered Communication/Language Barrier  Goal: Patient/Family is able to understand and participate in their care  Description: Interventions:  - Assess communication ability and preferred communication s administer replacement therapy as ordered  Outcome: Adequate for Discharge

## 2021-03-01 ENCOUNTER — TELEPHONE (OUTPATIENT)
Dept: HEMATOLOGY/ONCOLOGY | Facility: HOSPITAL | Age: 76
End: 2021-03-01

## 2021-03-01 ENCOUNTER — APPOINTMENT (OUTPATIENT)
Dept: HEMATOLOGY/ONCOLOGY | Facility: HOSPITAL | Age: 76
End: 2021-03-01
Attending: NURSE PRACTITIONER
Payer: MEDICARE

## 2021-03-01 PROCEDURE — 1111F DSCHRG MED/CURRENT MED MERGE: CPT | Performed by: INTERNAL MEDICINE

## 2021-03-01 PROCEDURE — G0180 MD CERTIFICATION HHA PATIENT: HCPCS | Performed by: INTERNAL MEDICINE

## 2021-03-01 NOTE — TELEPHONE ENCOUNTER
Koby received 02/26 from the pt's family requesting update on an order for hospital bed.  Pt had been Inpatient status and the bed was arranged by Inpatient CM team. Notes from CMSW/Nellie indicate the bed was delivered by [facility] on 2

## 2021-03-03 ENCOUNTER — OFFICE VISIT (OUTPATIENT)
Dept: HEMATOLOGY/ONCOLOGY | Facility: HOSPITAL | Age: 76
End: 2021-03-03
Attending: NURSE PRACTITIONER
Payer: COMMERCIAL

## 2021-03-03 ENCOUNTER — ANESTHESIA (OUTPATIENT)
Dept: SURGERY | Facility: HOSPITAL | Age: 76
DRG: 457 | End: 2021-03-03
Payer: MEDICARE

## 2021-03-03 ENCOUNTER — HOSPITAL ENCOUNTER (INPATIENT)
Facility: HOSPITAL | Age: 76
LOS: 2 days | Discharge: ANOTHER HOSPITAL | DRG: 457 | End: 2021-03-05
Attending: EMERGENCY MEDICINE | Admitting: HOSPITALIST
Payer: MEDICARE

## 2021-03-03 ENCOUNTER — APPOINTMENT (OUTPATIENT)
Dept: MRI IMAGING | Facility: HOSPITAL | Age: 76
DRG: 457 | End: 2021-03-03
Attending: EMERGENCY MEDICINE
Payer: MEDICARE

## 2021-03-03 ENCOUNTER — TELEPHONE (OUTPATIENT)
Dept: INTERNAL MEDICINE CLINIC | Facility: CLINIC | Age: 76
End: 2021-03-03

## 2021-03-03 ENCOUNTER — ANESTHESIA EVENT (OUTPATIENT)
Dept: SURGERY | Facility: HOSPITAL | Age: 76
DRG: 457 | End: 2021-03-03
Payer: MEDICARE

## 2021-03-03 ENCOUNTER — APPOINTMENT (OUTPATIENT)
Dept: GENERAL RADIOLOGY | Facility: HOSPITAL | Age: 76
DRG: 457 | End: 2021-03-03
Attending: NEUROLOGICAL SURGERY
Payer: MEDICARE

## 2021-03-03 VITALS
SYSTOLIC BLOOD PRESSURE: 149 MMHG | HEART RATE: 91 BPM | TEMPERATURE: 98 F | DIASTOLIC BLOOD PRESSURE: 64 MMHG | OXYGEN SATURATION: 97 % | RESPIRATION RATE: 16 BRPM

## 2021-03-03 VITALS
OXYGEN SATURATION: 97 % | TEMPERATURE: 98 F | SYSTOLIC BLOOD PRESSURE: 149 MMHG | DIASTOLIC BLOOD PRESSURE: 64 MMHG | HEART RATE: 91 BPM | RESPIRATION RATE: 16 BRPM

## 2021-03-03 DIAGNOSIS — Z23 NEED FOR VACCINATION: ICD-10-CM

## 2021-03-03 DIAGNOSIS — C79.51 METASTASIS TO VERTEBRAL COLUMN OF UNKNOWN ORIGIN (HCC): ICD-10-CM

## 2021-03-03 DIAGNOSIS — C34.32 MALIGNANT NEOPLASM OF LOWER LOBE OF LEFT LUNG (HCC): Primary | ICD-10-CM

## 2021-03-03 DIAGNOSIS — G83.4 CAUDA EQUINA COMPRESSION (HCC): ICD-10-CM

## 2021-03-03 DIAGNOSIS — C80.1 METASTASIS TO VERTEBRAL COLUMN OF UNKNOWN ORIGIN (HCC): ICD-10-CM

## 2021-03-03 DIAGNOSIS — M84.48XA: ICD-10-CM

## 2021-03-03 DIAGNOSIS — M84.48XA PATHOLOGICAL FRACTURE OF LUMBAR VERTEBRA, INITIAL ENCOUNTER: Primary | ICD-10-CM

## 2021-03-03 DIAGNOSIS — R29.898 WEAKNESS OF BOTH LOWER EXTREMITIES: ICD-10-CM

## 2021-03-03 DIAGNOSIS — C79.31 BRAIN METASTASIS (HCC): ICD-10-CM

## 2021-03-03 LAB
ANION GAP SERPL CALC-SCNC: 3 MMOL/L (ref 0–18)
BASOPHILS # BLD AUTO: 0.03 X10(3) UL (ref 0–0.2)
BASOPHILS NFR BLD AUTO: 0.2 %
BILIRUB UR QL: NEGATIVE
BUN BLD-MCNC: 21 MG/DL (ref 7–18)
BUN/CREAT SERPL: 41.2 (ref 10–20)
CALCIUM BLD-MCNC: 9.5 MG/DL (ref 8.5–10.1)
CHLORIDE SERPL-SCNC: 96 MMOL/L (ref 98–112)
CO2 SERPL-SCNC: 32 MMOL/L (ref 21–32)
COLOR UR: YELLOW
CREAT BLD-MCNC: 0.51 MG/DL
DEPRECATED RDW RBC AUTO: 43.5 FL (ref 35.1–46.3)
EOSINOPHIL # BLD AUTO: 0.03 X10(3) UL (ref 0–0.7)
EOSINOPHIL NFR BLD AUTO: 0.2 %
ERYTHROCYTE [DISTWIDTH] IN BLOOD BY AUTOMATED COUNT: 14.9 % (ref 11–15)
GLUCOSE BLD-MCNC: 119 MG/DL (ref 70–99)
GLUCOSE UR-MCNC: NEGATIVE MG/DL
HCT VFR BLD AUTO: 34.1 %
HGB BLD-MCNC: 10.9 G/DL
HGB UR QL STRIP.AUTO: NEGATIVE
IMM GRANULOCYTES # BLD AUTO: 0.28 X10(3) UL (ref 0–1)
IMM GRANULOCYTES NFR BLD: 1.8 %
KETONES UR-MCNC: NEGATIVE MG/DL
LEUKOCYTE ESTERASE UR QL STRIP.AUTO: NEGATIVE
LYMPHOCYTES # BLD AUTO: 0.48 X10(3) UL (ref 1–4)
LYMPHOCYTES NFR BLD AUTO: 3 %
MCH RBC QN AUTO: 25.9 PG (ref 26–34)
MCHC RBC AUTO-ENTMCNC: 32 G/DL (ref 31–37)
MCV RBC AUTO: 81 FL
MONOCYTES # BLD AUTO: 1.04 X10(3) UL (ref 0.1–1)
MONOCYTES NFR BLD AUTO: 6.6 %
NEUTROPHILS # BLD AUTO: 14 X10 (3) UL (ref 1.5–7.7)
NEUTROPHILS # BLD AUTO: 14 X10(3) UL (ref 1.5–7.7)
NEUTROPHILS NFR BLD AUTO: 88.2 %
NITRITE UR QL STRIP.AUTO: NEGATIVE
OSMOLALITY SERPL CALC.SUM OF ELEC: 276 MOSM/KG (ref 275–295)
PH UR: 7 [PH] (ref 5–8)
PLATELET # BLD AUTO: 213 10(3)UL (ref 150–450)
POTASSIUM SERPL-SCNC: 4.2 MMOL/L (ref 3.5–5.1)
PROT UR-MCNC: NEGATIVE MG/DL
RBC # BLD AUTO: 4.21 X10(6)UL
SARS-COV-2 RNA RESP QL NAA+PROBE: NOT DETECTED
SODIUM SERPL-SCNC: 131 MMOL/L (ref 136–145)
SP GR UR STRIP: 1.01 (ref 1–1.03)
UROBILINOGEN UR STRIP-ACNC: <2
WBC # BLD AUTO: 15.9 X10(3) UL (ref 4–11)

## 2021-03-03 PROCEDURE — 72158 MRI LUMBAR SPINE W/O & W/DYE: CPT | Performed by: EMERGENCY MEDICINE

## 2021-03-03 PROCEDURE — 99215 OFFICE O/P EST HI 40 MIN: CPT | Performed by: NURSE PRACTITIONER

## 2021-03-03 PROCEDURE — 76000 FLUOROSCOPY <1 HR PHYS/QHP: CPT | Performed by: NEUROLOGICAL SURGERY

## 2021-03-03 PROCEDURE — 0RGA0JJ FUSION OF THORACOLUMBAR VERTEBRAL JOINT WITH SYNTHETIC SUBSTITUTE, POSTERIOR APPROACH, ANTERIOR COLUMN, OPEN APPROACH: ICD-10-PCS | Performed by: NEUROLOGICAL SURGERY

## 2021-03-03 PROCEDURE — 99223 1ST HOSP IP/OBS HIGH 75: CPT | Performed by: HOSPITALIST

## 2021-03-03 PROCEDURE — 01NB0ZZ RELEASE LUMBAR NERVE, OPEN APPROACH: ICD-10-PCS | Performed by: NEUROLOGICAL SURGERY

## 2021-03-03 PROCEDURE — 0SG00JJ FUSION OF LUMBAR VERTEBRAL JOINT WITH SYNTHETIC SUBSTITUTE, POSTERIOR APPROACH, ANTERIOR COLUMN, OPEN APPROACH: ICD-10-PCS | Performed by: NEUROLOGICAL SURGERY

## 2021-03-03 DEVICE — IMPLANTABLE DEVICE: Type: IMPLANTABLE DEVICE | Status: FUNCTIONAL

## 2021-03-03 DEVICE — EXPEDIUM SFX CROSS CONNECTOR SYSTEM CONNECTOR F9 5.5 X 36MM
Type: IMPLANTABLE DEVICE | Status: FUNCTIONAL
Brand: EXPEDIUM SFX

## 2021-03-03 DEVICE — GRAFT BN CANC 15ML 1-4MM FRZDR: Type: IMPLANTABLE DEVICE | Status: FUNCTIONAL

## 2021-03-03 RX ORDER — SODIUM CHLORIDE 9 MG/ML
INJECTION, SOLUTION INTRAVENOUS CONTINUOUS
Status: DISCONTINUED | OUTPATIENT
Start: 2021-03-03 | End: 2021-03-05

## 2021-03-03 RX ORDER — HYDROCODONE BITARTRATE AND ACETAMINOPHEN 5; 325 MG/1; MG/1
1 TABLET ORAL AS NEEDED
Status: DISCONTINUED | OUTPATIENT
Start: 2021-03-03 | End: 2021-03-04 | Stop reason: HOSPADM

## 2021-03-03 RX ORDER — ROCURONIUM BROMIDE 10 MG/ML
INJECTION, SOLUTION INTRAVENOUS AS NEEDED
Status: DISCONTINUED | OUTPATIENT
Start: 2021-03-03 | End: 2021-03-03 | Stop reason: SURG

## 2021-03-03 RX ORDER — BUPIVACAINE HYDROCHLORIDE AND EPINEPHRINE 2.5; 5 MG/ML; UG/ML
INJECTION, SOLUTION INFILTRATION; PERINEURAL AS NEEDED
Status: DISCONTINUED | OUTPATIENT
Start: 2021-03-03 | End: 2021-03-03 | Stop reason: HOSPADM

## 2021-03-03 RX ORDER — MORPHINE SULFATE 4 MG/ML
4 INJECTION, SOLUTION INTRAMUSCULAR; INTRAVENOUS EVERY 10 MIN PRN
Status: DISCONTINUED | OUTPATIENT
Start: 2021-03-03 | End: 2021-03-04 | Stop reason: HOSPADM

## 2021-03-03 RX ORDER — DEXAMETHASONE SODIUM PHOSPHATE 4 MG/ML
VIAL (ML) INJECTION AS NEEDED
Status: DISCONTINUED | OUTPATIENT
Start: 2021-03-03 | End: 2021-03-03 | Stop reason: SURG

## 2021-03-03 RX ORDER — MORPHINE SULFATE 2 MG/ML
2 INJECTION, SOLUTION INTRAMUSCULAR; INTRAVENOUS EVERY 2 HOUR PRN
Status: DISCONTINUED | OUTPATIENT
Start: 2021-03-03 | End: 2021-03-04

## 2021-03-03 RX ORDER — HALOPERIDOL 5 MG/ML
0.25 INJECTION INTRAMUSCULAR ONCE AS NEEDED
Status: ACTIVE | OUTPATIENT
Start: 2021-03-03 | End: 2021-03-03

## 2021-03-03 RX ORDER — DEXAMETHASONE SODIUM PHOSPHATE 4 MG/ML
6 VIAL (ML) INJECTION EVERY 6 HOURS
Status: DISCONTINUED | OUTPATIENT
Start: 2021-03-03 | End: 2021-03-05

## 2021-03-03 RX ORDER — HYDROCODONE BITARTRATE AND ACETAMINOPHEN 5; 325 MG/1; MG/1
2 TABLET ORAL AS NEEDED
Status: DISCONTINUED | OUTPATIENT
Start: 2021-03-03 | End: 2021-03-04 | Stop reason: HOSPADM

## 2021-03-03 RX ORDER — PROCHLORPERAZINE EDISYLATE 5 MG/ML
5 INJECTION INTRAMUSCULAR; INTRAVENOUS ONCE AS NEEDED
Status: ACTIVE | OUTPATIENT
Start: 2021-03-03 | End: 2021-03-03

## 2021-03-03 RX ORDER — CEFAZOLIN SODIUM/WATER 2 G/20 ML
2 SYRINGE (ML) INTRAVENOUS ONCE
Status: COMPLETED | OUTPATIENT
Start: 2021-03-03 | End: 2021-03-03

## 2021-03-03 RX ORDER — METOCLOPRAMIDE HYDROCHLORIDE 5 MG/ML
10 INJECTION INTRAMUSCULAR; INTRAVENOUS EVERY 8 HOURS PRN
Status: DISCONTINUED | OUTPATIENT
Start: 2021-03-03 | End: 2021-03-05

## 2021-03-03 RX ORDER — PHENYLEPHRINE HCL 10 MG/ML
VIAL (ML) INJECTION AS NEEDED
Status: DISCONTINUED | OUTPATIENT
Start: 2021-03-03 | End: 2021-03-03 | Stop reason: SURG

## 2021-03-03 RX ORDER — HYDROMORPHONE HYDROCHLORIDE 1 MG/ML
0.6 INJECTION, SOLUTION INTRAMUSCULAR; INTRAVENOUS; SUBCUTANEOUS EVERY 5 MIN PRN
Status: DISCONTINUED | OUTPATIENT
Start: 2021-03-03 | End: 2021-03-04 | Stop reason: HOSPADM

## 2021-03-03 RX ORDER — HYDROMORPHONE HYDROCHLORIDE 1 MG/ML
0.2 INJECTION, SOLUTION INTRAMUSCULAR; INTRAVENOUS; SUBCUTANEOUS EVERY 5 MIN PRN
Status: DISCONTINUED | OUTPATIENT
Start: 2021-03-03 | End: 2021-03-04 | Stop reason: HOSPADM

## 2021-03-03 RX ORDER — MORPHINE SULFATE 2 MG/ML
1 INJECTION, SOLUTION INTRAMUSCULAR; INTRAVENOUS EVERY 2 HOUR PRN
Status: DISCONTINUED | OUTPATIENT
Start: 2021-03-03 | End: 2021-03-04

## 2021-03-03 RX ORDER — ONDANSETRON 2 MG/ML
INJECTION INTRAMUSCULAR; INTRAVENOUS AS NEEDED
Status: DISCONTINUED | OUTPATIENT
Start: 2021-03-03 | End: 2021-03-03 | Stop reason: SURG

## 2021-03-03 RX ORDER — MORPHINE SULFATE 4 MG/ML
2 INJECTION, SOLUTION INTRAMUSCULAR; INTRAVENOUS ONCE
Status: COMPLETED | OUTPATIENT
Start: 2021-03-03 | End: 2021-03-03

## 2021-03-03 RX ORDER — NALOXONE HYDROCHLORIDE 0.4 MG/ML
80 INJECTION, SOLUTION INTRAMUSCULAR; INTRAVENOUS; SUBCUTANEOUS AS NEEDED
Status: DISCONTINUED | OUTPATIENT
Start: 2021-03-03 | End: 2021-03-04 | Stop reason: HOSPADM

## 2021-03-03 RX ORDER — ONDANSETRON 2 MG/ML
4 INJECTION INTRAMUSCULAR; INTRAVENOUS EVERY 6 HOURS PRN
Status: DISCONTINUED | OUTPATIENT
Start: 2021-03-03 | End: 2021-03-04

## 2021-03-03 RX ORDER — ONDANSETRON 2 MG/ML
4 INJECTION INTRAMUSCULAR; INTRAVENOUS ONCE AS NEEDED
Status: ACTIVE | OUTPATIENT
Start: 2021-03-03 | End: 2021-03-03

## 2021-03-03 RX ORDER — MORPHINE SULFATE 4 MG/ML
2 INJECTION, SOLUTION INTRAMUSCULAR; INTRAVENOUS EVERY 2 HOUR PRN
Status: DISCONTINUED | OUTPATIENT
Start: 2021-03-03 | End: 2021-03-04

## 2021-03-03 RX ORDER — MORPHINE SULFATE 4 MG/ML
4 INJECTION, SOLUTION INTRAMUSCULAR; INTRAVENOUS EVERY 2 HOUR PRN
Status: DISCONTINUED | OUTPATIENT
Start: 2021-03-03 | End: 2021-03-04

## 2021-03-03 RX ORDER — LIDOCAINE HYDROCHLORIDE 10 MG/ML
INJECTION, SOLUTION EPIDURAL; INFILTRATION; INTRACAUDAL; PERINEURAL AS NEEDED
Status: DISCONTINUED | OUTPATIENT
Start: 2021-03-03 | End: 2021-03-03 | Stop reason: SURG

## 2021-03-03 RX ORDER — DEXAMETHASONE SODIUM PHOSPHATE 10 MG/ML
10 INJECTION, SOLUTION INTRAMUSCULAR; INTRAVENOUS ONCE
Status: COMPLETED | OUTPATIENT
Start: 2021-03-03 | End: 2021-03-03

## 2021-03-03 RX ORDER — SODIUM CHLORIDE, SODIUM LACTATE, POTASSIUM CHLORIDE, CALCIUM CHLORIDE 600; 310; 30; 20 MG/100ML; MG/100ML; MG/100ML; MG/100ML
INJECTION, SOLUTION INTRAVENOUS CONTINUOUS
Status: DISCONTINUED | OUTPATIENT
Start: 2021-03-03 | End: 2021-03-04 | Stop reason: HOSPADM

## 2021-03-03 RX ORDER — NEOSTIGMINE METHYLSULFATE 1 MG/ML
INJECTION INTRAVENOUS AS NEEDED
Status: DISCONTINUED | OUTPATIENT
Start: 2021-03-03 | End: 2021-03-03 | Stop reason: SURG

## 2021-03-03 RX ORDER — HYDROMORPHONE HYDROCHLORIDE 1 MG/ML
0.4 INJECTION, SOLUTION INTRAMUSCULAR; INTRAVENOUS; SUBCUTANEOUS EVERY 5 MIN PRN
Status: DISCONTINUED | OUTPATIENT
Start: 2021-03-03 | End: 2021-03-04 | Stop reason: HOSPADM

## 2021-03-03 RX ORDER — EPHEDRINE SULFATE 50 MG/ML
INJECTION, SOLUTION INTRAVENOUS AS NEEDED
Status: DISCONTINUED | OUTPATIENT
Start: 2021-03-03 | End: 2021-03-03 | Stop reason: SURG

## 2021-03-03 RX ORDER — MORPHINE SULFATE 10 MG/ML
6 INJECTION, SOLUTION INTRAMUSCULAR; INTRAVENOUS EVERY 10 MIN PRN
Status: DISCONTINUED | OUTPATIENT
Start: 2021-03-03 | End: 2021-03-04 | Stop reason: HOSPADM

## 2021-03-03 RX ORDER — MORPHINE SULFATE 4 MG/ML
2 INJECTION, SOLUTION INTRAMUSCULAR; INTRAVENOUS EVERY 10 MIN PRN
Status: DISCONTINUED | OUTPATIENT
Start: 2021-03-03 | End: 2021-03-04 | Stop reason: HOSPADM

## 2021-03-03 RX ORDER — GLYCOPYRROLATE 0.2 MG/ML
INJECTION, SOLUTION INTRAMUSCULAR; INTRAVENOUS AS NEEDED
Status: DISCONTINUED | OUTPATIENT
Start: 2021-03-03 | End: 2021-03-03 | Stop reason: SURG

## 2021-03-03 RX ADMIN — EPHEDRINE SULFATE 5 MG: 50 INJECTION, SOLUTION INTRAVENOUS at 22:00:00

## 2021-03-03 RX ADMIN — PHENYLEPHRINE HCL 100 MCG: 10 MG/ML VIAL (ML) INJECTION at 21:54:00

## 2021-03-03 RX ADMIN — PHENYLEPHRINE HCL 100 MCG: 10 MG/ML VIAL (ML) INJECTION at 20:29:00

## 2021-03-03 RX ADMIN — ONDANSETRON 4 MG: 2 INJECTION INTRAMUSCULAR; INTRAVENOUS at 22:04:00

## 2021-03-03 RX ADMIN — PHENYLEPHRINE HCL 100 MCG: 10 MG/ML VIAL (ML) INJECTION at 21:04:00

## 2021-03-03 RX ADMIN — ROCURONIUM BROMIDE 50 MG: 10 INJECTION, SOLUTION INTRAVENOUS at 20:09:00

## 2021-03-03 RX ADMIN — GLYCOPYRROLATE 0.4 MG: 0.2 INJECTION, SOLUTION INTRAMUSCULAR; INTRAVENOUS at 22:08:00

## 2021-03-03 RX ADMIN — PHENYLEPHRINE HCL 100 MCG: 10 MG/ML VIAL (ML) INJECTION at 21:38:00

## 2021-03-03 RX ADMIN — SODIUM CHLORIDE: 9 INJECTION, SOLUTION INTRAVENOUS at 22:01:00

## 2021-03-03 RX ADMIN — NEOSTIGMINE METHYLSULFATE 3 MG: 1 INJECTION INTRAVENOUS at 22:08:00

## 2021-03-03 RX ADMIN — LIDOCAINE HYDROCHLORIDE 25 MG: 10 INJECTION, SOLUTION EPIDURAL; INFILTRATION; INTRACAUDAL; PERINEURAL at 20:09:00

## 2021-03-03 RX ADMIN — CEFAZOLIN SODIUM/WATER 2 G: 2 G/20 ML SYRINGE (ML) INTRAVENOUS at 20:15:00

## 2021-03-03 RX ADMIN — ROCURONIUM BROMIDE 20 MG: 10 INJECTION, SOLUTION INTRAVENOUS at 21:31:00

## 2021-03-03 RX ADMIN — DEXAMETHASONE SODIUM PHOSPHATE 4 MG: 4 MG/ML VIAL (ML) INJECTION at 20:28:00

## 2021-03-03 RX ADMIN — EPHEDRINE SULFATE 5 MG: 50 INJECTION, SOLUTION INTRAVENOUS at 20:51:00

## 2021-03-03 RX ADMIN — PHENYLEPHRINE HCL 100 MCG: 10 MG/ML VIAL (ML) INJECTION at 21:11:00

## 2021-03-03 RX ADMIN — PHENYLEPHRINE HCL 100 MCG: 10 MG/ML VIAL (ML) INJECTION at 20:21:00

## 2021-03-03 NOTE — PROGRESS NOTES
KIYA Eli is a 68year old female here for follow up of Malignant neoplasm of lower lobe of left lung (hcc)  (primary encounter diagnosis)  Brain metastasis (hcc)  Metastasis to vertebral column of unknown origin (hcc)    Patient complet fentaNYL 25 MCG/HR Transdermal Patch 72 Hr Place 1 patch onto the skin every third day. For increasing cancer-related pain. Please fill.  10 patch 0   • HYDROmorphone HCl 4 MG Oral Tab Take 2 tablets (8 mg total) by mouth every 3 (three) hours as needed (AS by Jeremias Patel MD at [address] [place] [address] OR   • YAG CAPSULOTOMY - OD - RIGHT EYE Right 6/6/15    DOMITILA   • YAG CAPSULOTOMY - OS - LEFT EYE Left 06/16/2015    DOMITILA     Social History    Tobacco Use      Smoking status: Never Smoker      Smokeless tobacco: Never Used biopsy to be sent for PD-L1, EGFR, ALK, ROS–1, BRAF, RET, and MET.  --Given that she is a non-smoking female, patient sent, discussed with patient and her son via phone that risk but likelihood that she may have an EGFR mutation, and the for patients in th

## 2021-03-03 NOTE — CONSULTS
[facility]    Report of Consultation    Jovani Mclaughlin Carmelina Marieal Patient Status:  Emergency    1945 MRN [id number]   Location [address] Attending Shoaib Roberts MD   Hosp Day # 0 PCP Silvio Newell MD     Da DOMITILA   • YAG CAPSULOTOMY - OS - LEFT EYE Left 06/16/2015    DOMITILA       Family History  Family History   Problem Relation Age of Onset   • Hypertension Mother    • Lipids Mother         hyperlipidemia   • Glaucoma Mother    • Hypertension Father    • Heart 03/03/2021    ALB 3.1 (L) 02/26/2021    ALKPHO 74 02/25/2021    TP 6.1 (L) 02/25/2021    AST 11 (L) 02/25/2021    ALT 32 02/25/2021    PTT 25.8 01/31/2021    INR 0.99 01/31/2021    PTP 12.9 01/31/2021    TSH 0.172 (L) 02/26/2021    MG 2.6 02/22/2021    DANIEL to do an L1 corpectomy. We carefully discussed this with Ms. Nancy Enamorado, her  at the bedside, and her son  Deannelucia Wong over the phone. We discussed the potential benefits, inherent risks, and alternatives to surgery.   All of their questions were answere

## 2021-03-03 NOTE — ED INITIAL ASSESSMENT (HPI)
Patient presents to ER with RN from cancer center. Patient has hx of lung ca, with mets to brain and spine. RN states since overnight, she has been unable to stand or walk, and increase in pain. Patient states pain is to her lower back.  \"tumor on L

## 2021-03-03 NOTE — ED PROVIDER NOTES
Patient Seen in: [facility] AND [facility] Emergency Department    History   Patient presents with:  Weakness    Stated Complaint: Leg weakness - Sent by Dr. Morataya Click for Dr. Samaria Olivares    HPI    Patient complains of increased pain and weakness.   Patient has adenocarcinoma mouth daily. Senna-Docusate Sodium 8.6-50 MG Oral Tab,  Take 2 tablets by mouth 3 (three) times daily. mirtazapine 15 MG Oral Tab,  Take 1 tablet (15 mg total) by mouth nightly. PEG 3350 17 g Oral Powd Pack,  Take 17 g by mouth daily.    fentaNYL 25 M Alcohol use: No      Alcohol/week: 0.0 standard drinks    Drug use: Not Currently      Review of Systems    Positive for stated complaint: Leg weakness - Sent by Dr. Joey Kelsey for Dr. Brandi Bryant  Other systems are as noted in HPI.   Constitutional and vital signs revie for the following components:    Clarity Urine Hazy (*)     All other components within normal limits   CBC W/ DIFFERENTIAL - Abnormal; Notable for the following components:    WBC 15.9 (*)     HGB 10.9 (*)     HCT 34.1 (*)     MCH 25.9 (*)     Neutrophil Procedures:      Critical Care:   I spent a total of 55 minutes of critical care time in obtaining history, performing a physical exam, bedside monitoring of interventions, collecting and interpreting tests and discussion with consultants but not incl

## 2021-03-03 NOTE — ED NOTES
.Orders for admission, patient is aware of plan and ready to go upstairs. Any questions, please call ED DELONTE Portillo at extension [phone].    Type of COVID test sent: Rapid  COVID Suspicion level: Low  Titratable drug(s) infusing: None  Rate:  LOC at time of brown

## 2021-03-04 ENCOUNTER — APPOINTMENT (OUTPATIENT)
Dept: CT IMAGING | Facility: HOSPITAL | Age: 76
DRG: 457 | End: 2021-03-04
Attending: NEUROLOGICAL SURGERY
Payer: MEDICARE

## 2021-03-04 ENCOUNTER — TELEPHONE (OUTPATIENT)
Dept: HEMATOLOGY/ONCOLOGY | Facility: HOSPITAL | Age: 76
End: 2021-03-04

## 2021-03-04 PROBLEM — Z51.5 PALLIATIVE CARE BY SPECIALIST: Status: ACTIVE | Noted: 2021-03-04

## 2021-03-04 LAB
ANION GAP SERPL CALC-SCNC: 4 MMOL/L (ref 0–18)
BASOPHILS # BLD AUTO: 0.01 X10(3) UL (ref 0–0.2)
BASOPHILS NFR BLD AUTO: 0.1 %
BUN BLD-MCNC: 14 MG/DL (ref 7–18)
BUN/CREAT SERPL: 25.5 (ref 10–20)
CALCIUM BLD-MCNC: 8.6 MG/DL (ref 8.5–10.1)
CHLORIDE SERPL-SCNC: 102 MMOL/L (ref 98–112)
CO2 SERPL-SCNC: 30 MMOL/L (ref 21–32)
CREAT BLD-MCNC: 0.55 MG/DL
DEPRECATED RDW RBC AUTO: 44.8 FL (ref 35.1–46.3)
EOSINOPHIL # BLD AUTO: 0 X10(3) UL (ref 0–0.7)
EOSINOPHIL NFR BLD AUTO: 0 %
ERYTHROCYTE [DISTWIDTH] IN BLOOD BY AUTOMATED COUNT: 15 % (ref 11–15)
GLUCOSE BLD-MCNC: 127 MG/DL (ref 70–99)
HCT VFR BLD AUTO: 28.4 %
HGB BLD-MCNC: 9.2 G/DL
IMM GRANULOCYTES # BLD AUTO: 0.18 X10(3) UL (ref 0–1)
IMM GRANULOCYTES NFR BLD: 1.3 %
LYMPHOCYTES # BLD AUTO: 0.42 X10(3) UL (ref 1–4)
LYMPHOCYTES NFR BLD AUTO: 3.1 %
MCH RBC QN AUTO: 26.6 PG (ref 26–34)
MCHC RBC AUTO-ENTMCNC: 32.4 G/DL (ref 31–37)
MCV RBC AUTO: 82.1 FL
MONOCYTES # BLD AUTO: 0.69 X10(3) UL (ref 0.1–1)
MONOCYTES NFR BLD AUTO: 5.1 %
NEUTROPHILS # BLD AUTO: 12.16 X10 (3) UL (ref 1.5–7.7)
NEUTROPHILS # BLD AUTO: 12.16 X10(3) UL (ref 1.5–7.7)
NEUTROPHILS NFR BLD AUTO: 90.4 %
OSMOLALITY SERPL CALC.SUM OF ELEC: 284 MOSM/KG (ref 275–295)
PLATELET # BLD AUTO: 190 10(3)UL (ref 150–450)
POTASSIUM SERPL-SCNC: 3.8 MMOL/L (ref 3.5–5.1)
RBC # BLD AUTO: 3.46 X10(6)UL
SODIUM SERPL-SCNC: 136 MMOL/L (ref 136–145)
WBC # BLD AUTO: 13.5 X10(3) UL (ref 4–11)

## 2021-03-04 PROCEDURE — 72131 CT LUMBAR SPINE W/O DYE: CPT | Performed by: NEUROLOGICAL SURGERY

## 2021-03-04 PROCEDURE — 99291 CRITICAL CARE FIRST HOUR: CPT | Performed by: INTERNAL MEDICINE

## 2021-03-04 PROCEDURE — 99233 SBSQ HOSP IP/OBS HIGH 50: CPT | Performed by: HOSPITALIST

## 2021-03-04 PROCEDURE — 99253 IP/OBS CNSLTJ NEW/EST LOW 45: CPT | Performed by: INTERNAL MEDICINE

## 2021-03-04 RX ORDER — HYDROMORPHONE HYDROCHLORIDE 1 MG/ML
0.3 INJECTION, SOLUTION INTRAMUSCULAR; INTRAVENOUS; SUBCUTANEOUS
Status: DISCONTINUED | OUTPATIENT
Start: 2021-03-04 | End: 2021-03-04

## 2021-03-04 RX ORDER — ONDANSETRON 2 MG/ML
4 INJECTION INTRAMUSCULAR; INTRAVENOUS EVERY 6 HOURS PRN
Status: DISCONTINUED | OUTPATIENT
Start: 2021-03-04 | End: 2021-03-04

## 2021-03-04 RX ORDER — HYDROCODONE BITARTRATE AND ACETAMINOPHEN 10; 325 MG/1; MG/1
1 TABLET ORAL EVERY 4 HOURS PRN
Status: DISCONTINUED | OUTPATIENT
Start: 2021-03-04 | End: 2021-03-05

## 2021-03-04 RX ORDER — ONDANSETRON 2 MG/ML
4 INJECTION INTRAMUSCULAR; INTRAVENOUS EVERY 6 HOURS PRN
Status: DISCONTINUED | OUTPATIENT
Start: 2021-03-04 | End: 2021-03-05

## 2021-03-04 RX ORDER — HYDROCODONE BITARTRATE AND ACETAMINOPHEN 10; 325 MG/1; MG/1
2 TABLET ORAL EVERY 4 HOURS PRN
Status: DISCONTINUED | OUTPATIENT
Start: 2021-03-04 | End: 2021-03-04

## 2021-03-04 RX ORDER — HYDROMORPHONE HYDROCHLORIDE 1 MG/ML
0.6 INJECTION, SOLUTION INTRAMUSCULAR; INTRAVENOUS; SUBCUTANEOUS EVERY 2 HOUR PRN
Status: DISCONTINUED | OUTPATIENT
Start: 2021-03-04 | End: 2021-03-05

## 2021-03-04 RX ORDER — CEFAZOLIN SODIUM/WATER 2 G/20 ML
2 SYRINGE (ML) INTRAVENOUS EVERY 8 HOURS
Status: DISCONTINUED | OUTPATIENT
Start: 2021-03-04 | End: 2021-03-05

## 2021-03-04 RX ORDER — NALOXONE HYDROCHLORIDE 0.4 MG/ML
0.08 INJECTION, SOLUTION INTRAMUSCULAR; INTRAVENOUS; SUBCUTANEOUS
Status: DISCONTINUED | OUTPATIENT
Start: 2021-03-04 | End: 2021-03-04

## 2021-03-04 RX ORDER — HYDROMORPHONE HYDROCHLORIDE 1 MG/ML
0.4 INJECTION, SOLUTION INTRAMUSCULAR; INTRAVENOUS; SUBCUTANEOUS EVERY 2 HOUR PRN
Status: DISCONTINUED | OUTPATIENT
Start: 2021-03-04 | End: 2021-03-05

## 2021-03-04 RX ORDER — DIPHENHYDRAMINE HYDROCHLORIDE 50 MG/ML
12.5 INJECTION INTRAMUSCULAR; INTRAVENOUS EVERY 4 HOURS PRN
Status: DISCONTINUED | OUTPATIENT
Start: 2021-03-04 | End: 2021-03-05

## 2021-03-04 RX ORDER — DIPHENHYDRAMINE HYDROCHLORIDE 50 MG/ML
12.5 INJECTION INTRAMUSCULAR; INTRAVENOUS EVERY 4 HOURS PRN
Status: DISCONTINUED | OUTPATIENT
Start: 2021-03-04 | End: 2021-03-04

## 2021-03-04 RX ORDER — NALOXONE HYDROCHLORIDE 0.4 MG/ML
0.08 INJECTION, SOLUTION INTRAMUSCULAR; INTRAVENOUS; SUBCUTANEOUS
Status: DISCONTINUED | OUTPATIENT
Start: 2021-03-04 | End: 2021-03-05

## 2021-03-04 RX ORDER — HYDROMORPHONE HYDROCHLORIDE 1 MG/ML
0.6 INJECTION, SOLUTION INTRAMUSCULAR; INTRAVENOUS; SUBCUTANEOUS
Status: DISCONTINUED | OUTPATIENT
Start: 2021-03-04 | End: 2021-03-04

## 2021-03-04 RX ORDER — HYDROMORPHONE HYDROCHLORIDE 1 MG/ML
1 INJECTION, SOLUTION INTRAMUSCULAR; INTRAVENOUS; SUBCUTANEOUS EVERY 2 HOUR PRN
Status: DISCONTINUED | OUTPATIENT
Start: 2021-03-04 | End: 2021-03-05

## 2021-03-04 NOTE — TELEPHONE ENCOUNTER
Patient's  called and said that they got conflicting letters in the mail that said that medication Estrella Rosales was not approved on 2/24 then on 2/25 they got a letter from Irvin Gonzalez 150 it was approved. He wants to make sure that she will get the medication.

## 2021-03-04 NOTE — ANESTHESIA POSTPROCEDURE EVALUATION
Patient: Parker Degroot    Procedure Summary     Date: 03/03/21 Room / Location: [address] [place] [address] MAIN OR 12 / [address] [place] [address] MAIN OR    Anesthesia Start: 2002 Anesthesia Stop: 9714    Procedure: POSTERIOR LUMBAR LAMINECT SPINAL FUS W/INSTR 2 LEV (N/A Spine Lumbar) Diagnosi

## 2021-03-04 NOTE — OPERATIVE REPORT
NEUROSURGERY OPERATIVE NOTE    Surgery Date: 03/03/21  Hospital: [facility]  Patient Name: Natali Anguiano  Patient MR#: [id number]  Surgeon: Dr. Ho Saravai.  Michael  Co-Surgeon: None  Assistant: None    Anesthesia:  GETA  Length: 2 hours  Antibiotics: IV  Specimen: L1 the operation. General endotracheal anesthesia was performed by the anesthesia team and appropriate lines were placed.   The patient was placed in a head desai and turned to a prone position on the operating table with care taken to keep the neck in a olivia posterior wall of L1. The high-speed drill was then used to perform a transpedicular decompression on the left side.   This was carried down into the vertebral body itself and then down curettes were used to tamp the retropulsed bony fragments into the kermit

## 2021-03-04 NOTE — TELEPHONE ENCOUNTER
Returned phone call. Notified that first month of the Osmertinib was approved by insurance to be filled by [facility] month. Informed working on getting approved to get filled by [facility] for next refill.  Discussed pt not start medication t

## 2021-03-04 NOTE — PROGRESS NOTES
Per patient and , there was plan to increase fentanyl patch to 50 mcg/hr due to 25 mcg not controlling the pain. Patient was requiring 4mg dilaudid q2-3 hrs in addition to the 25 mcg/hr fentanyl patch.

## 2021-03-04 NOTE — CONSULTS
[address] [place] [address]  [id number]  Hospital Day #1  Date of Consult: 03/04/21  Patient seen at: [address]    Reason for Consultation:      Consult requested by Dr Kristina Baez for ev pain escalated last night and she did not have her Fentanyl patch. EMR reviewed.     Past Medical History/ Past Surgical history:     Past Medical History:   Diagnosis Date   • Cancer ([facility] [address]     Lung, spine mets   • Cataract 2007    OD   • Cataract 2007 Medications:   •  HYDROcodone-acetaminophen (NORCO)  MG per tab 1 tablet, 1 tablet, Oral, Q4H PRN  •  ceFAZolin sodium (ANCEF/KEFZOL) 2 GM/20ML premix IV syringe 2 g, 2 g, Intravenous, Q8H  •  HYDROmorphone HCl (DILAUDID) 1 MG/ML injection 0.4 mg, 0. 02/26/2021    TROP <0.045 02/21/2021       Albumin (g/dL)   Date Value   02/26/2021 3.1 (L)   02/25/2021 3.1 (L)   02/22/2021 2.7 (L)   02/16/2021 3.5   02/02/2021 3.6   08/11/2020 4.0       Imaging:  Mri Spine Lumbar (w+wo) (cpt=72158)    Result Date: 3/3 97.9 °F (36.6 °C) (Oral)   Resp 18   Ht 5' (1.524 m)   Wt 125 lb 12.8 oz (57.1 kg)   SpO2 95%   BMI 24.57 kg/m²     Weights:  Wt Readings from Last 6 Encounters:  03/03/21 : 125 lb 12.8 oz (57.1 kg)  02/21/21 : 142 lb 6.4 oz (64.6 kg)  02/16/21 : 149 lb (67 with palliative care services. We talked about pain medications. Per EMR, she did not tolerate Gabapentin TID  (better BID?)  Dr Matthew Wilson wanted me to hold on this medication for now. PCA Pump ordered for pain control - agree with plan.   Communicated with per family request.    4. Psychosocial: Emotional support provided. Spiritual Care Consultation politely declined. She has a [employer] [facility] [employer] [place] [facility] community praying for her and her family.        5. Disposition: TBD    - Discussed today’s visit with Dr Demond Hoffman

## 2021-03-04 NOTE — CM/SW NOTE
CM received MDO to assist with re-scheduling pts covid vaccine. CM met with pt and  at bedside. Pt  states that pt is wc bound at baseline and he has been assisting her will ADL's. They are currently staying at pt's sons home (0 s.  Madiso caregiver who is available, willing and able to provide assistance with the wheelchair. New social work order entered for [employer]. MD to co-sign. MARGARITO spoke with Tom Alonso whom confirms pt is current with them. Resume [facility] orders entered.      Tip

## 2021-03-04 NOTE — ANESTHESIA PROCEDURE NOTES
Peripheral IV  Date/Time: 3/3/2021 8:15 PM  Inserted by: Mariza Gandara MD    Placement  Needle size: 18 G  Laterality: left  Location: hand  Site prep: alcohol  Technique: anatomical landmarks  Attempts: 1

## 2021-03-04 NOTE — PROGRESS NOTES
S: c/o pain    O:    03/04/21  0810   BP: 140/65   Pulse: 86   Resp: 18   Temp: 97.9 °F (36.6 °C)     Motor 3/5 BLE  Wound spotted    A/P  The patient is doing well and her leg strength seems better.   She will need a second surgery but I have been havingpr

## 2021-03-04 NOTE — H&P
[facility]    PATIENT'S NAME: Mikal Moreland   ATTENDING PHYSICIAN: Yarelis Cardona MD   PATIENT ACCOUNT#:   [de-identified]    LOCATION:  [place] [address]  MEDICAL RECORD #:   [id number]       YOB: 1945  ADMISSION DATE:       03 did not start yet. Also, history of hypertension and hyperlipidemia. PAST SURGICAL HISTORY:  Total abdominal hysterectomy, cataract procedure, and hemorrhoidectomy. ALLERGIES:  No known drug allergies.     FAMILY HISTORY:  Mother had hypertension, fa compression fracture from metastatic disease with retropulsion of fragments and severe spinal stenosis, possible caudae equina syndrome. 2.   Metastatic lung cancer as outlined above. The patient will be admitted to general medical floor.   MODESTO Licea

## 2021-03-04 NOTE — ANESTHESIA PROCEDURE NOTES
Airway  Urgency: elective      General Information and Staff    Patient location during procedure: OR  Anesthesiologist: Ortiz Delgado MD  Performed: anesthesiologist     Indications and Patient Condition  Indications for airway management: anesthe

## 2021-03-04 NOTE — PLAN OF CARE
Patient axo4. Patient was in sever pain this am, PCA continuous and patient controlled started. Patient reports relief from PCA dose Patient has remained free from falls throughout stay. Hourly rounding maintained.   Pt's bed in lowest position w/ side bowers goals for specific interventions  3/4/2021 1112 by Cameron Conti RN  Outcome: Progressing  3/4/2021 1111 by Cameron Conti RN  Outcome: Progressing     Problem: PAIN - ADULT  Goal: Verbalizes/displays adequate comfort level or patient's stated pain go toileting schedule  3/4/2021 1112 by Leda Mandujano, RN  Outcome: Progressing  3/4/2021 1111 by Leda Mandujano, RN  Outcome: Progressing     Problem: DISCHARGE PLANNING  Goal: Discharge to home or other facility with appropriate resources  Description: I

## 2021-03-04 NOTE — PROGRESS NOTES
[facility]    Progress Note    Nga Alexia Guillory Patient Status:  Inpatient    1945 MRN [id number]   Location [facility] 4W/SW/SE Attending Genaro Dugan MD   Hosp Day # 1 PCP Darrin Khan MD       Subjective:   Bettina Morrow bilaterally. These findings contribute to moderate to severe spinal canal narrowing and severe bilateral foraminal stenosis, worse on the left. 2. Progression of osseous metastatic disease with new lesions identified in the T11, T12, and L2 vertebrae.   3.

## 2021-03-04 NOTE — ANESTHESIA PREPROCEDURE EVALUATION
Anesthesia PreOp Note    HPI:     Eben Wallace is a 68year old female who presents for preoperative consultation requested by: Mitali Burkett MD    Date of Surgery: 3/3/2021    Procedure(s):  POSTERIOR LUMBAR LAMINECT SPINAL FUS W/INSTR 2 LEV  Sharon Date Noted: 05/21/2015      Visit for screening mammogram         Date Noted: 04/07/2015      Knee pain, chronic         Date Noted: 04/07/2015      Hyperlipidemia         Date Noted: 04/07/2015      Hypertension         Date Noted: 04/07/2015 •  PEG 3350 17 g Oral Powd Pack, Take 17 g by mouth daily. , Disp:  , Rfl: 0, 3/2/2021 at Unknown time    •  fentaNYL 25 MCG/HR Transdermal Patch 72 Hr, Place 1 patch onto the skin every third day. For increasing cancer-related pain.  Please fill., Disp: 10 •  morphINE sulfate (PF) 2 MG/ML injection 1 mg, 1 mg, Intravenous, Q2H PRN, Oscar Hernandez MD    Or    •  morphINE sulfate (PF) 2 MG/ML injection 2 mg, 2 mg, Intravenous, Q2H PRN, Oscar Hernandez MD    Or    •  morphINE sulfate (PF) 4 MG/ML injection 4 Inability: Not on file      Transportation needs        Medical: Not on file        Non-medical: Not on file    Tobacco Use      Smoking status: Never Smoker      Smokeless tobacco: Never Used    Substance and Sexual Activity      Alcohol use:  No CL 96 (L) 03/03/2021    CO2 32.0 03/03/2021    BUN 21 (H) 03/03/2021    CREATSERUM 0.51 (L) 03/03/2021     (H) 03/03/2021    PGLU 103 (H) 02/11/2021    CA 9.5 03/03/2021          Vital Signs: Body mass index is 24.57 kg/m².    height is 1.524 m (5' Monitors and Lines:   Additonal IV  Airway:  ETT  Post-op Pain Management: IV analgesics  Plan Comments: I have discussed the anesthetic plan, major risks and alternatives with the patient and answered all questions.  The patient desires to proceed with missy

## 2021-03-04 NOTE — CONSULTS
Neurosurgery Consult Note    _______________________ PATIENT HISTORY _______________________    CC: Leg Weakness    HPI: A neurosurgery consult was requested by Dr Neida Tan. The patient is a 74yo AF who has a history of lung CA treated by Dr Gena Cunningham.   She deandra Transportation needs        Medical: Not on file        Non-medical: Not on file    Tobacco Use      Smoking status: Never Smoker      Smokeless tobacco: Never Used    Substance and Sexual Activity      Alcohol use: No        Alcohol/week: 0.0 standard dri by mouth nightly., Disp: 30 tablet, Rfl: 0    •  PEG 3350 17 g Oral Powd Pack, Take 17 g by mouth daily. , Disp:  , Rfl: 0    •  fentaNYL 25 MCG/HR Transdermal Patch 72 Hr, Place 1 patch onto the skin every third day. For increasing cancer-related pain.  Ple DTR 2 2 Babinski - -   Francois reflex - - Clonus - -       SENSATION   Left arm Right arm Left leg Right leg   Light touch + + - -   Pin prick + + + +   Propriocep. + + + +       Cranial nerves 2-12 intact      Oriented to person, place, time        Affect Ron Shirley MD on 3/03/2021 at 3:25 PM               MRI:           Bone scan:      Discogram:      Myelogram:    Recent Labs   Lab 02/26/21  0718 02/27/21  1102 03/03/21  1151   GLU 97 118* 119*   BUN 20* 18 21*   CREATSERUM 0.62 0.50* 0.51*   GFRAA 1

## 2021-03-04 NOTE — PLAN OF CARE
Patient arrived to floor at approx 0030.   Dr Gudelia Rodas ordered IV Dilaudid for pain as morphine was not working for patient and Dilaudid has been successful for her in past.  Discussed home pain medications with Dr Pepe Olivo as patient requesting fentanyl patch, n of pain and evaluate response  - Implement non-pharmacological measures as appropriate and evaluate response  - Consider cultural and social influences on pain and pain management  - Manage/alleviate anxiety  - Utilize distraction and/or relaxation techniq responsible for managing their own health  - Refer to Case Management Department for coordinating discharge planning if the patient needs post-hospital services based on physician/LIP order or complex needs related to functional status, cognitive ability o

## 2021-03-04 NOTE — PACU NOTE
Pt and pt's  stated pt has had prior numbness that is worse on the right than the left. Pt states some numbness still remains with some tingling.

## 2021-03-04 NOTE — PROGRESS NOTES
Pt not seen for outpatient Palliative Care appointment today- she was sent to ER t/ r/o cord compression.

## 2021-03-05 VITALS
OXYGEN SATURATION: 100 % | HEIGHT: 60 IN | DIASTOLIC BLOOD PRESSURE: 75 MMHG | HEART RATE: 82 BPM | SYSTOLIC BLOOD PRESSURE: 147 MMHG | TEMPERATURE: 98 F | BODY MASS INDEX: 24.7 KG/M2 | RESPIRATION RATE: 12 BRPM | WEIGHT: 125.81 LBS

## 2021-03-05 LAB
ANION GAP SERPL CALC-SCNC: 2 MMOL/L (ref 0–18)
BUN BLD-MCNC: 9 MG/DL (ref 7–18)
BUN/CREAT SERPL: 14.3 (ref 10–20)
CALCIUM BLD-MCNC: 8.5 MG/DL (ref 8.5–10.1)
CHLORIDE SERPL-SCNC: 104 MMOL/L (ref 98–112)
CO2 SERPL-SCNC: 30 MMOL/L (ref 21–32)
CREAT BLD-MCNC: 0.63 MG/DL
DEPRECATED RDW RBC AUTO: 45.1 FL (ref 35.1–46.3)
ERYTHROCYTE [DISTWIDTH] IN BLOOD BY AUTOMATED COUNT: 15.1 % (ref 11–15)
GLUCOSE BLD-MCNC: 121 MG/DL (ref 70–99)
HCT VFR BLD AUTO: 27.7 %
HGB BLD-MCNC: 8.7 G/DL
MCH RBC QN AUTO: 25.6 PG (ref 26–34)
MCHC RBC AUTO-ENTMCNC: 31.4 G/DL (ref 31–37)
MCV RBC AUTO: 81.5 FL
OSMOLALITY SERPL CALC.SUM OF ELEC: 282 MOSM/KG (ref 275–295)
PLATELET # BLD AUTO: 173 10(3)UL (ref 150–450)
POTASSIUM SERPL-SCNC: 4.4 MMOL/L (ref 3.5–5.1)
RBC # BLD AUTO: 3.4 X10(6)UL
SODIUM SERPL-SCNC: 136 MMOL/L (ref 136–145)
WBC # BLD AUTO: 10.9 X10(3) UL (ref 4–11)

## 2021-03-05 PROCEDURE — 99232 SBSQ HOSP IP/OBS MODERATE 35: CPT | Performed by: INTERNAL MEDICINE

## 2021-03-05 PROCEDURE — 99239 HOSP IP/OBS DSCHRG MGMT >30: CPT | Performed by: HOSPITALIST

## 2021-03-05 NOTE — PROGRESS NOTES
Patient received 12 doses of 0.2mg patient controlled dilaudid, so 2.4mg since 945am. There were 16 requests for doses.

## 2021-03-05 NOTE — PLAN OF CARE
Alert and oriented x 4. Anxious at times. CMS remains unchanged. . Bilateral dorsi/plantar flexion strong. Remote tele applied, no calls. Room air. LBM 3/2/21, flatus noted. Prune juice given. Dangle at bedside. TLSO on when OOB.  Fall precautions discussed Term Goal: My goal is to have my pain be controlled to a 5 or less.          Interventions:   - pain meds, ice  - See additional Care Plan goals for specific interventions  Outcome: Progressing     Problem: PAIN - ADULT  Goal: Verbalizes/displays adequate c INTERVENTIONS:  - Identify barriers to discharge w/pt and caregiver  - Include patient/family/discharge partner in discharge planning  - Arrange for needed discharge resources and transportation as appropriate  - Identify discharge learning needs (meds, wo ex. Angina  - Evaluate fluid balance, assess for edema, trend weights  Outcome: Progressing  Goal: Absence of cardiac arrhythmias or at baseline  Description: INTERVENTIONS:  - Continuous cardiac monitoring, monitor vital signs, obtain 12 lead EKG if indic [facility]GR[facility] - ADULT  Goal: Incision(s), wounds(s) or drain site(s) healing without S/S of infection  Description: INTERVENTIONS:  - Assess and document risk factors for pressure ulcer development  - Assess and document skin integrity  - Assess and document d

## 2021-03-05 NOTE — DISCHARGE SUMMARY
[facility]    Discharge Summary    Maranda Osorio Patient Status:  Inpatient    1945 MRN [id number]   Location [facility] 4W/SW/SE Attending Umm Sal MD   Hosp Day # 2 PCP Jacky Hicks MD     Date of Admission: epidural and paraspinal extension of the tumor into the psoas musculature bilaterally. Findings compatible with moderate to severe spinal canal narrowing and severe bilateral foraminal narrowing, worse on the left.   Progression of osseous metastatic disea Surgical Procedures     Case IDs Date Procedure Surgeon Location Status    [id number] 3/3/21 POSTERIOR LUMBAR LAMINECT SPINAL FUS W/INSTR 2 LEV Srinivasan Robertson MD [facility] MAIN OR Comp            Discharge Plan:   Discharge Condition: Stable    Current Discharge tablet (4 mg total) by mouth 3 (three) times daily. Stop taking on: April 9, 2021  Quantity: 90 tablet  Refills: 1     fentaNYL 25 MCG/HR Pt72  Commonly known as: [address] 1 patch onto the skin every third day.  For increasing cancer-related p tablet  Refills: 4     Vitamin D3 50 MCG (2000 UT) Caps  Commonly known as: VITAMIN D3      Take 2,000 Units by mouth daily. Refills: 0            Follow up:      Follow-up Information     Leticia Lopez MD.    Specialties: Hematology and Oncology, Jacob

## 2021-03-05 NOTE — PLAN OF CARE
Post op lumbar thoracic surgery with patent STACY maintained and surgical dressing 4x4s with tegaderm to incision with small old dry drainage; all extremities mobile (with numbness to bilateral lower extremities preexisting/historical).  Maintained on bedrest INTERVENTIONS:  - Encourage pt to monitor pain and request assistance  - Assess pain using appropriate pain scale  - Administer analgesics based on type and severity of pain and evaluate response  - Implement non-pharmacological measures as appropriate and discharge as needed  - Consider post-discharge preferences of patient/family/discharge partner  - Complete POLST form as appropriate  - Assess patient's ability to be responsible for managing their own health  - Refer to Case Management Department for coor heart rate control medications as ordered  - Initiate emergency measures for life threatening arrhythmias  - Monitor electrolytes and administer replacement therapy as ordered  Outcome: Progressing     Problem: GASTROINTESTINAL - ADULT  Goal: Maintains or adequate protection for wounds/incisions during mobilization  - Obtain PT/OT consults as needed  - Advance activity as appropriate  - Communicate ordered activity level and limitations with patient/family  Outcome: Progressing

## 2021-03-05 NOTE — PROGRESS NOTES
S: No events    O:    03/05/21  0418   BP: 147/75   Pulse: 82   Resp: 12   Temp: 98.4 °F (36.9 °C)       Wound - dressing spotted  STACY 50  Motor 3/5 BLE    A/P  The patient is doing well and her leg strength is improving.   She needs a second surgery to paco

## 2021-03-05 NOTE — PROGRESS NOTES
[address] [facility] Follow Up    Matt Grief Jen Fisher  [id number]  Hospital Day #2  Date of Consult: 03/04/21  Patient seen at: [address]    Subjective:      Patient was seen and examined with her  at the be Continuous  •  Naloxone HCl (NARCAN) 0.4 MG/ML injection 0.08 mg, 0.08 mg, Intravenous, Q5 Min PRN  •  diphenhydrAMINE HCl (BENADRYL) IV PUSH injection 12.5 mg, 12.5 mg, Intravenous, Q4H PRN  •  ondansetron HCl (ZOFRAN) injection 4 mg, 4 mg, Intravenous, Q Additional multilevel degenerative change lumbar spine as described on preceding MRI. The progressed osseous metastatic lesions T11, T12 and L2 noted on preceding MRI better defined on that examination.      Dictated by (CST): Xochitl Srivastava MD on 3/04/ 24.57  Neurologic: Alert and awake, able to ask and answer questions appropriately. Psychiatric: Mood: no agitation noted, no anxiety noted, appears to be in good spirits. Skin: Warm and dry.      Palliative Performance Scale:     Palliative Performance [facility]          Palliative Care Recommendations/Plan:     1. Ongoing goals of care discussion: continue with current medical treatments    2.  Advance Care Planning:   - CODE STATUS:  FULL CODE STATUS  - POLST: deferred  - Healthcare ARNOLD / Iggy surrogate: her

## 2021-03-05 NOTE — CM/SW NOTE
LASHAY received MDO for [facility]. Pt needs additional L1 Coprectomy and stabilization of the spine to be completed at [facility] by Dr. Victorino Castle.      LASHAY called and spoke with [address] who states she is aware of

## 2021-03-10 ENCOUNTER — TELEPHONE (OUTPATIENT)
Dept: HEMATOLOGY/ONCOLOGY | Facility: HOSPITAL | Age: 76
End: 2021-03-10

## 2021-03-10 NOTE — TELEPHONE ENCOUNTER
Pt's spouse called to check on status. Per spouse pt has second spinal surgery 3/6/21 and still at [facility]. Spouse stated she is wheelchair bound and working on learning to stand and walk.  The plan is for pt to go to [facility] rehab post hospitali

## 2021-03-15 ENCOUNTER — TELEPHONE (OUTPATIENT)
Dept: HEMATOLOGY/ONCOLOGY | Facility: HOSPITAL | Age: 76
End: 2021-03-15

## 2021-03-15 NOTE — TELEPHONE ENCOUNTER
Liliya calling from [place] [facility] [address] [place] [address] with a prior [employer] my meds christina, Stevie Wilson. She will also be faxing form over.  leonardo

## 2021-03-16 ENCOUNTER — TELEPHONE (OUTPATIENT)
Dept: HEMATOLOGY/ONCOLOGY | Facility: HOSPITAL | Age: 76
End: 2021-03-16

## 2021-03-16 NOTE — TELEPHONE ENCOUNTER
Returned phone call. Pt currently in rehab in [place]. Pt had second spinal surgery 3/6/21. Per spouse pt walking in rehab with walker. Pt taking pain medicine as needed every 4 hours and still having pain.  Discussed with Dr. Yrn Barker and instructed that ok fo

## 2021-03-16 NOTE — TELEPHONE ENCOUNTER
Lucas Gannon is calling to get instructions for his wife's cancer medication Clarktheresa Benavides, she will be receiving it at the [facility] on 3/17/21. Please call Page Proctor very important.

## 2021-03-23 ENCOUNTER — TELEPHONE (OUTPATIENT)
Dept: HEMATOLOGY/ONCOLOGY | Facility: HOSPITAL | Age: 76
End: 2021-03-23

## 2021-03-23 DIAGNOSIS — C79.31 BRAIN METASTASIS (HCC): ICD-10-CM

## 2021-03-23 DIAGNOSIS — C34.32 MALIGNANT NEOPLASM OF LOWER LOBE OF LEFT LUNG (HCC): ICD-10-CM

## 2021-03-23 NOTE — TELEPHONE ENCOUNTER
[place] [address] called to check on status of Osmertinib. Per [facility] script transferred to [facility] pharmacy phone [phone] fax [phone]. [facility] called. Dr. Kristina Baez faxed new script.      Joey Philip from [facility] authorization

## 2021-03-24 ENCOUNTER — TELEPHONE (OUTPATIENT)
Dept: HEMATOLOGY/ONCOLOGY | Facility: HOSPITAL | Age: 76
End: 2021-03-24

## 2021-03-24 NOTE — TELEPHONE ENCOUNTER
C1 D8 Osmertinib started 3/17/2. Pt had [facility] rehab. Per spouse pt not having any side effects from Osmertinib. Per spouse spasms to back improving and not having constantly. Pt still has espitia catheter in place. Plan for discharge is soon per spouse.  Spo

## 2021-03-29 ENCOUNTER — TELEPHONE (OUTPATIENT)
Dept: HEMATOLOGY/ONCOLOGY | Facility: HOSPITAL | Age: 76
End: 2021-03-29

## 2021-03-29 DIAGNOSIS — C34.32 MALIGNANT NEOPLASM OF LOWER LOBE OF LEFT LUNG (HCC): ICD-10-CM

## 2021-03-29 DIAGNOSIS — C79.31 BRAIN METASTASIS (HCC): ICD-10-CM

## 2021-03-29 NOTE — TELEPHONE ENCOUNTER
Griselda Corral is also asking for a 3 month refill of morphine, a refill of dexamethasone, and Methocarbamol 75p0mg tablet. He is asking for a call back. He is asking that these three are sent to [facility] DR TRACEY FRANCIS in [facility] [place].   He is asking that her Walsh Lauren is se

## 2021-03-29 NOTE — TELEPHONE ENCOUNTER
Returned phone. Discussed MD/Provider unable to dispense 90 day supply of Morphine/narcotics due to regulatory standards. Discussed [facility] will dispense 30 more pills of the Osmertinib and still working with insurance for authorization.  Spouse to call Edw

## 2021-03-29 NOTE — TELEPHONE ENCOUNTER
Patient discharge fax    Reason for visit- L1 Fx S/P TLIF   Admit Date: 3/15/21  Discharge Date: 3/26/21  Encounter Type: Rehabilitation

## 2021-03-29 NOTE — TELEPHONE ENCOUNTER
Pat's , Christa Spangler, called. They have a few more additional questions for [address] regarding their earlier conversation. Please call.

## 2021-03-29 NOTE — TELEPHONE ENCOUNTER
I called and spoke with Charlette Herrmann and Yana Cummings via telephone. Charlette Herrmann was discharged from [facility] rehab on Friday, March 26, 2021 s/p surgery with Dr. Shimon Sheppard for cord compression.      Pat's current pain med regimen is as follows:    -Fentanyl 50 mcg/hour, rosa extremity and left side\" starting Saturday evening. Per Harley Just is hardly able to walk since Saturday night. \"  I explained to Ryley Solorzano and Blas Scherer that this is an alarming and emergent symptom and should be evaluated in the emergency room immediately.

## 2021-04-01 DIAGNOSIS — C34.90 PRIMARY MALIGNANT NEOPLASM OF LUNG METASTATIC TO OTHER SITE (HCC): ICD-10-CM

## 2021-04-01 DIAGNOSIS — Z51.81 MEDICATION MONITORING ENCOUNTER: ICD-10-CM

## 2021-04-01 DIAGNOSIS — C34.32 MALIGNANT NEOPLASM OF LOWER LOBE OF LEFT LUNG (HCC): Primary | ICD-10-CM

## 2021-04-06 ENCOUNTER — OFFICE VISIT (OUTPATIENT)
Dept: HEMATOLOGY/ONCOLOGY | Facility: HOSPITAL | Age: 76
End: 2021-04-06
Attending: INTERNAL MEDICINE
Payer: COMMERCIAL

## 2021-04-06 ENCOUNTER — TELEPHONE (OUTPATIENT)
Dept: HEMATOLOGY/ONCOLOGY | Facility: HOSPITAL | Age: 76
End: 2021-04-06

## 2021-04-06 VITALS
SYSTOLIC BLOOD PRESSURE: 114 MMHG | WEIGHT: 128 LBS | HEART RATE: 83 BPM | DIASTOLIC BLOOD PRESSURE: 43 MMHG | HEIGHT: 60 IN | BODY MASS INDEX: 25.13 KG/M2 | RESPIRATION RATE: 18 BRPM | OXYGEN SATURATION: 97 % | TEMPERATURE: 98 F

## 2021-04-06 VITALS
TEMPERATURE: 98 F | BODY MASS INDEX: 25.13 KG/M2 | RESPIRATION RATE: 18 BRPM | HEIGHT: 60 IN | HEART RATE: 83 BPM | DIASTOLIC BLOOD PRESSURE: 43 MMHG | SYSTOLIC BLOOD PRESSURE: 114 MMHG | OXYGEN SATURATION: 97 % | WEIGHT: 128 LBS

## 2021-04-06 DIAGNOSIS — C79.51 METASTASIS TO VERTEBRAL COLUMN OF UNKNOWN ORIGIN (HCC): ICD-10-CM

## 2021-04-06 DIAGNOSIS — Z71.89 GOALS OF CARE, COUNSELING/DISCUSSION: ICD-10-CM

## 2021-04-06 DIAGNOSIS — C80.1 METASTASIS TO VERTEBRAL COLUMN OF UNKNOWN ORIGIN (HCC): ICD-10-CM

## 2021-04-06 DIAGNOSIS — C34.32 MALIGNANT NEOPLASM OF LOWER LOBE OF LEFT LUNG (HCC): ICD-10-CM

## 2021-04-06 DIAGNOSIS — C79.31 BRAIN METASTASIS (HCC): ICD-10-CM

## 2021-04-06 DIAGNOSIS — C34.90 PRIMARY MALIGNANT NEOPLASM OF LUNG METASTATIC TO OTHER SITE (HCC): ICD-10-CM

## 2021-04-06 DIAGNOSIS — Z71.89 ADVANCE CARE PLANNING: ICD-10-CM

## 2021-04-06 DIAGNOSIS — M62.838 MUSCLE SPASM: ICD-10-CM

## 2021-04-06 DIAGNOSIS — K59.03 THERAPEUTIC OPIOID INDUCED CONSTIPATION: ICD-10-CM

## 2021-04-06 DIAGNOSIS — G89.3 CANCER RELATED PAIN: Primary | ICD-10-CM

## 2021-04-06 DIAGNOSIS — T40.2X5A THERAPEUTIC OPIOID INDUCED CONSTIPATION: ICD-10-CM

## 2021-04-06 DIAGNOSIS — C34.32 MALIGNANT NEOPLASM OF LOWER LOBE OF LEFT LUNG (HCC): Primary | ICD-10-CM

## 2021-04-06 DIAGNOSIS — Z51.81 MEDICATION MONITORING ENCOUNTER: ICD-10-CM

## 2021-04-06 DIAGNOSIS — Z51.81 ENCOUNTER FOR MEDICATION MONITORING: ICD-10-CM

## 2021-04-06 PROCEDURE — 99215 OFFICE O/P EST HI 40 MIN: CPT | Performed by: NURSE PRACTITIONER

## 2021-04-06 PROCEDURE — 99215 OFFICE O/P EST HI 40 MIN: CPT | Performed by: INTERNAL MEDICINE

## 2021-04-06 PROCEDURE — 80053 COMPREHEN METABOLIC PANEL: CPT

## 2021-04-06 PROCEDURE — 83735 ASSAY OF MAGNESIUM: CPT

## 2021-04-06 PROCEDURE — 99417 PROLNG OP E/M EACH 15 MIN: CPT | Performed by: NURSE PRACTITIONER

## 2021-04-06 PROCEDURE — 36415 COLL VENOUS BLD VENIPUNCTURE: CPT

## 2021-04-06 PROCEDURE — 85025 COMPLETE CBC W/AUTO DIFF WBC: CPT

## 2021-04-06 RX ORDER — FENTANYL 75 UG/H
1 PATCH TRANSDERMAL
Qty: 5 PATCH | Refills: 0 | Status: SHIPPED | OUTPATIENT
Start: 2021-04-06 | End: 2021-04-19

## 2021-04-06 RX ORDER — GABAPENTIN 100 MG/1
100 CAPSULE ORAL 3 TIMES DAILY
Qty: 90 CAPSULE | Refills: 1 | Status: SHIPPED | OUTPATIENT
Start: 2021-04-06 | End: 2021-05-24

## 2021-04-06 RX ORDER — MORPHINE SULFATE 30 MG/1
TABLET ORAL
Qty: 60 TABLET | Refills: 0 | Status: SHIPPED | OUTPATIENT
Start: 2021-04-06 | End: 2021-04-19

## 2021-04-06 RX ORDER — METHOCARBAMOL 750 MG/1
750 TABLET, FILM COATED ORAL 3 TIMES DAILY PRN
Qty: 60 TABLET | Refills: 0 | Status: SHIPPED | OUTPATIENT
Start: 2021-04-06 | End: 2021-06-15

## 2021-04-06 NOTE — PROGRESS NOTES
Palliative Care Follow-Up Note     Patient Name: Thee Mejía   YOB: 1945   Medical Record Number: [id number]   Date of visit: 4/6/2021     Chief Complaint/Reason for Visit:  Patient presents with:  Palliative Care    Past Medical Hi pain; chronic, but has worsened since cancer diagnosis  -Slight improvement in back pain since surgery with Dr. Darin Matthews  -Pain is sharp and aching in character  -Alleviating factors include pain meds (see A/P) for current pain med regimen  -Aggravating factor Medication monitoring encounter     Encounter for medication monitoring       Medical History:  Past Medical History:   Diagnosis Date   • Cancer ([facility] [address]     Lung, spine mets   • Cataract 2007    OD   • Cataract 2007    OS   • Essential hypertension    • H/O education: Not on file      Highest education level: Not on file    Tobacco Use      Smoking status: Never Smoker      Smokeless tobacco: Never Used    Vaping Use      Vaping Use: Never used    Substance and Sexual Activity      Alcohol use: No        Alco ) 90 tablet 1   • ondansetron 4 MG Oral Tablet Dispersible Take 1 tablet (4 mg total) by mouth every 8 (eight) hours as needed for Nausea. 60 tablet 0   • Omega-3-acid Ethyl Esters (LOVAZA) 1 g Oral Cap Take 1 capsule (1 g total) by mouth daily.  90 capsule ear normal.      Nose: Nose normal.      Mouth/Throat:      Mouth: Mucous membranes are moist.      Pharynx: Oropharynx is clear. Eyes:      General: No scleral icterus.      Conjunctiva/sclera: Conjunctivae normal.   Cardiovascular:      Rate and Rhythm: Transdermal Patch 72 Hr; Place 1 patch onto the skin every third day. Dispense: 5 patch; Refill: 0  - gabapentin 100 MG Oral Cap; Take 1 capsule (100 mg total) by mouth 3 (three) times daily. Dispense: 90 capsule; Refill: 1    2.  Muscle spasm  - methocar treatment  -Provided emotional support to pt/family who appear to be coping adequately  -Patient is agreeable to hospitalization, if indicated  -Patient is agreeable to following up with outpatient palliative care  -See above narrative for further details

## 2021-04-06 NOTE — PROGRESS NOTES
HPI   Thee Mejía is a 68year old female here for follow up of Malignant neoplasm of lower lobe of left lung (hcc)  (primary encounter diagnosis)  Brain metastasis (hcc)  Metastasis to vertebral column of unknown origin (hcc)  Encounter for med disturbance. The patient is nervous/anxious. Current Outpatient Medications   Medication Sig Dispense Refill   • Osimertinib Mesylate 80 MG Oral Tab Take 160 mg by mouth daily for 28 days. 56 tablet 11   • losartan Potassium 50 MG Oral Tab Take 0. OS   • Essential hypertension    • H/O hemorrhoidectomy 1983   • High blood pressure    • HTN (hypertension)    • Lipid screening 4/29/2014    per:NG   • Ophthalmological disorder 2007    IOU increased C/D ratio   • Vitreous floaters 2007    OD     Past kg/m²    Wt Readings from Last 6 Encounters:  04/06/21 : 58.1 kg (128 lb)  04/06/21 : 58.1 kg (128 lb)  03/03/21 : 57.1 kg (125 lb 12.8 oz)  02/21/21 : 64.6 kg (142 lb 6.4 oz)  02/16/21 : 67.6 kg (149 lb)  02/16/21 : 64.9 kg (143 lb)        Physical Exam place. Following with urology. --dexamethasone 1 mg TID per neurosurgery. --Continue with current pain management with opioid analgesia prn, see above       3) Brain metastasis  --MRI of the brain to rule out metastatic disease c/w metastasis.   No signif .0 150.0 - 450.0 10(3)uL    Neutrophil Absolute Prelim 3.68 1.50 - 7.70 x10 (3) uL    Neutrophil Absolute 3.68 1.50 - 7.70 x10(3) uL    Lymphocyte Absolute 1.19 1.00 - 4.00 x10(3) uL    Monocyte Absolute 0.78 0.10 - 1.00 x10(3) uL    Eosinophil Abso

## 2021-04-06 NOTE — PATIENT INSTRUCTIONS
Recent Results (from the past 24 hour(s))   COMP METABOLIC PANEL (14)    Collection Time: 04/06/21  8:40 AM   Result Value Ref Range    Glucose 97 70 - 99 mg/dL    Sodium 136 136 - 145 mmol/L    Potassium 3.7 3.5 - 5.1 mmol/L    Chloride 102 98 - 112 mmol/

## 2021-04-06 NOTE — TELEPHONE ENCOUNTER
Called and spoke with Amado Morrison at [facility]. Willard Guthrie has been on Fentanyl Patch and Mirtazapine since surgery, she has tolerated well with no side effects or s/s serotonin syndrome, CNS depression, respiratory depression, and is on bowel regimen.

## 2021-04-06 NOTE — PATIENT INSTRUCTIONS
For pain:  -Increase Fentanyl patch dose to 75mcg/hr- change patch every 72 hours (3 days)  -Continue Morphine 30mg- take 1 tab every 6 hours/as needed for breakthrough pain  -Methocarbimol- take three times a day/as needed for muscle spasms  -Tylenol 500m
What Is The Reason For Today's Visit?: Full Body Skin Examination
What Is The Reason For Today's Visit? (Being Monitored For X): concerning skin lesions on an annual basis
How Severe Are Your Spot(S)?: mild

## 2021-04-07 ENCOUNTER — TELEPHONE (OUTPATIENT)
Dept: INTERNAL MEDICINE CLINIC | Facility: CLINIC | Age: 76
End: 2021-04-07

## 2021-04-07 NOTE — TELEPHONE ENCOUNTER
[facility] is calling [address] [place] [address] notifying the doctor that the are going to see the patient this week on 4/4/21 for her physical therapy evaluation. And that they are going to see her next week of 4/11/21 for patients occupational therapy.  Please c

## 2021-04-12 ENCOUNTER — HOSPITAL ENCOUNTER (OUTPATIENT)
Dept: CT IMAGING | Facility: HOSPITAL | Age: 76
Discharge: HOME | End: 2021-04-12
Attending: NEUROLOGICAL SURGERY
Payer: COMMERCIAL

## 2021-04-12 DIAGNOSIS — S32.018A OTH FRACTURE OF FIRST LUMBAR VERTEBRA, INIT FOR CLOS FX (HCC): ICD-10-CM

## 2021-04-12 DIAGNOSIS — C41.2 MALIGNANT NEOPLASM OF VERTEBRAL COLUMN, EXCLUDING SACRUM AND COCCYX (HCC): ICD-10-CM

## 2021-04-12 PROCEDURE — 72131 CT LUMBAR SPINE W/O DYE: CPT | Performed by: NEUROLOGICAL SURGERY

## 2021-04-13 NOTE — TELEPHONE ENCOUNTER
Nelaverradha Carranza had CT yesterday and Dr Tressie Galeazzi thinks there may be cancer on another disc. Lakisha Person also asking for refill on mirtazapine 15 MG Oral Tab and something to help her sleep at night .  This was initially ordered by hospitalist  Thank you

## 2021-04-14 RX ORDER — MIRTAZAPINE 15 MG/1
15 TABLET, FILM COATED ORAL NIGHTLY
Qty: 30 TABLET | Refills: 0 | Status: CANCELLED | OUTPATIENT
Start: 2021-04-14

## 2021-04-14 RX ORDER — MIRTAZAPINE 15 MG/1
15 TABLET, FILM COATED ORAL NIGHTLY
Qty: 30 TABLET | Refills: 3 | Status: SHIPPED | OUTPATIENT
Start: 2021-04-14 | End: 2021-04-15 | Stop reason: DRUGHIGH

## 2021-04-15 DIAGNOSIS — G47.9 DIFFICULTY SLEEPING: Primary | ICD-10-CM

## 2021-04-15 RX ORDER — MIRTAZAPINE 30 MG/1
30 TABLET, FILM COATED ORAL NIGHTLY
Qty: 30 TABLET | Refills: 1 | Status: SHIPPED | OUTPATIENT
Start: 2021-04-15 | End: 2021-06-11

## 2021-04-15 NOTE — TELEPHONE ENCOUNTER
I spoke with Aury Brito and Yareli Hill. Yareli Hill reports difficulty sleeping. She has been taking Mirtazapine 15mg at bedtime. Will increase Mirtazapine to 30mg at bedtime. Sent Rx to pharmacy.  An interaction between Fentanyl and Mirtazapine acknowledged- Pt has bee

## 2021-04-19 DIAGNOSIS — C80.1 METASTASIS TO VERTEBRAL COLUMN OF UNKNOWN ORIGIN (HCC): ICD-10-CM

## 2021-04-19 DIAGNOSIS — G89.3 CANCER RELATED PAIN: ICD-10-CM

## 2021-04-19 DIAGNOSIS — C79.51 METASTASIS TO VERTEBRAL COLUMN OF UNKNOWN ORIGIN (HCC): ICD-10-CM

## 2021-04-19 RX ORDER — MORPHINE SULFATE 30 MG/1
30 TABLET ORAL
Qty: 75 TABLET | Refills: 0 | Status: SHIPPED | OUTPATIENT
Start: 2021-04-19 | End: 2021-05-06

## 2021-04-19 RX ORDER — FENTANYL 75 UG/H
1 PATCH TRANSDERMAL
Qty: 5 PATCH | Refills: 0 | Status: SHIPPED | OUTPATIENT
Start: 2021-04-19 | End: 2021-05-06

## 2021-04-20 ENCOUNTER — TELEPHONE (OUTPATIENT)
Dept: HEMATOLOGY/ONCOLOGY | Facility: HOSPITAL | Age: 76
End: 2021-04-20

## 2021-04-20 NOTE — TELEPHONE ENCOUNTER
I spoke with Luz Pena and Olvin Butts. Luz Pena has been doing better physically and has had adequate pain control since last PC visit. She is able to move around a bit more. She saw Dr. Donte Hamlin on 4/19/21.  Plan is to repeat CT scan in 5 weeks and f/u again with Dr. Mccormick Manual

## 2021-04-26 ENCOUNTER — TELEPHONE (OUTPATIENT)
Dept: HEMATOLOGY/ONCOLOGY | Facility: HOSPITAL | Age: 76
End: 2021-04-26

## 2021-04-26 ENCOUNTER — HOSPITAL ENCOUNTER (EMERGENCY)
Facility: HOSPITAL | Age: 76
Discharge: HOME | End: 2021-04-26
Attending: EMERGENCY MEDICINE
Payer: COMMERCIAL

## 2021-04-26 VITALS
DIASTOLIC BLOOD PRESSURE: 54 MMHG | OXYGEN SATURATION: 98 % | HEART RATE: 83 BPM | RESPIRATION RATE: 16 BRPM | SYSTOLIC BLOOD PRESSURE: 121 MMHG | BODY MASS INDEX: 25.13 KG/M2 | WEIGHT: 128 LBS | TEMPERATURE: 99 F | HEIGHT: 60 IN

## 2021-04-26 DIAGNOSIS — R60.9 PERIPHERAL EDEMA: Primary | ICD-10-CM

## 2021-04-26 PROCEDURE — 80048 BASIC METABOLIC PNL TOTAL CA: CPT | Performed by: EMERGENCY MEDICINE

## 2021-04-26 PROCEDURE — 83880 ASSAY OF NATRIURETIC PEPTIDE: CPT | Performed by: EMERGENCY MEDICINE

## 2021-04-26 PROCEDURE — 99284 EMERGENCY DEPT VISIT MOD MDM: CPT

## 2021-04-26 PROCEDURE — 85025 COMPLETE CBC W/AUTO DIFF WBC: CPT | Performed by: EMERGENCY MEDICINE

## 2021-04-26 PROCEDURE — 36415 COLL VENOUS BLD VENIPUNCTURE: CPT

## 2021-04-26 PROCEDURE — 84484 ASSAY OF TROPONIN QUANT: CPT | Performed by: EMERGENCY MEDICINE

## 2021-04-26 RX ORDER — FUROSEMIDE 20 MG/1
20 TABLET ORAL DAILY
Qty: 14 TABLET | Refills: 0 | Status: SHIPPED | OUTPATIENT
Start: 2021-04-26 | End: 2021-05-10

## 2021-04-26 RX ORDER — FUROSEMIDE 20 MG/1
20 TABLET ORAL DAILY
Qty: 14 TABLET | Refills: 0 | Status: SHIPPED | OUTPATIENT
Start: 2021-04-26 | End: 2021-04-26

## 2021-04-26 NOTE — TELEPHONE ENCOUNTER
Toxicities: Osimertinib 160 mg daily    Eye Pain & Photophobia: ( reports that today Luz Pena has been complaining both her eyes are watery, burning and hurts when she looks at the light.  He denies redness or purulent discharge.)  Localized Edema: (Hus

## 2021-04-26 NOTE — TELEPHONE ENCOUNTER
Lorenza Sox days Luis Alfredo Shine is in [place]. He says her eyes are very sensitive to light, and she is having a burning sensation in both eyes. He says both lower legs are swelling, and the right is severe. He says the ankles are especially puffy.  He says she can walk

## 2021-04-27 NOTE — ED PROVIDER NOTES
Patient Seen in: [facility] AND [facility] Emergency Department    History   Patient presents with:  Swelling Edema      HPI    Patient with a history of lung cancer and hypertension presents to the ED complaining of swelling to bilateral lower legs.   She states detachment Neg         multiple   • Diabetes Neg        Smoking Status: Social History    Socioeconomic History      Marital status:       Spouse name: Not on file      Number of children: Not on file      Years of education: Not on file      [facility] Rate and Rhythm: Normal rate and regular rhythm. Pulmonary:      Effort: Pulmonary effort is normal. No respiratory distress. Breath sounds: Normal breath sounds. No stridor. Abdominal:      General: There is no distension.       Palpations: A Medications - No data to display      MDM      04/26/21  1756 04/26/21 1915 04/26/21  1930 04/26/21 1945   BP: 117/73 136/56 132/61 121/54   Pulse: 76 82 81 83   Resp: 17 18 16 16   Temp: 99.4 °F (37.4 °C)      TempSrc: Oral      SpO2: 98% 94% 96% 98%

## 2021-04-27 NOTE — ED QUICK NOTES
rec'd care of pt from triage. C/o bilateral l.e. edema and redness. +cms to bilateral l.e. requesting to sit in wheelchair instead of getting onto er cart. Denies sob. Denies chest pain. #20 g Iv started to lac, blood drawn and sent to lab.  Family at [facility]

## 2021-04-30 ENCOUNTER — APPOINTMENT (OUTPATIENT)
Dept: GENERAL RADIOLOGY | Facility: HOSPITAL | Age: 76
End: 2021-04-30
Attending: EMERGENCY MEDICINE
Payer: COMMERCIAL

## 2021-04-30 ENCOUNTER — HOSPITAL ENCOUNTER (EMERGENCY)
Facility: HOSPITAL | Age: 76
Discharge: HOME | End: 2021-04-30
Attending: EMERGENCY MEDICINE
Payer: COMMERCIAL

## 2021-04-30 ENCOUNTER — TELEPHONE (OUTPATIENT)
Dept: HEMATOLOGY/ONCOLOGY | Facility: HOSPITAL | Age: 76
End: 2021-04-30

## 2021-04-30 ENCOUNTER — APPOINTMENT (OUTPATIENT)
Dept: CT IMAGING | Facility: HOSPITAL | Age: 76
End: 2021-04-30
Attending: EMERGENCY MEDICINE
Payer: COMMERCIAL

## 2021-04-30 ENCOUNTER — APPOINTMENT (OUTPATIENT)
Dept: ULTRASOUND IMAGING | Facility: HOSPITAL | Age: 76
End: 2021-04-30
Attending: EMERGENCY MEDICINE
Payer: COMMERCIAL

## 2021-04-30 VITALS
BODY MASS INDEX: 25.11 KG/M2 | RESPIRATION RATE: 21 BRPM | HEIGHT: 60 IN | OXYGEN SATURATION: 100 % | HEART RATE: 88 BPM | SYSTOLIC BLOOD PRESSURE: 126 MMHG | DIASTOLIC BLOOD PRESSURE: 62 MMHG | WEIGHT: 127.88 LBS | TEMPERATURE: 98 F

## 2021-04-30 DIAGNOSIS — R06.00 DYSPNEA, UNSPECIFIED TYPE: Primary | ICD-10-CM

## 2021-04-30 PROCEDURE — 93010 ELECTROCARDIOGRAM REPORT: CPT | Performed by: EMERGENCY MEDICINE

## 2021-04-30 PROCEDURE — 84484 ASSAY OF TROPONIN QUANT: CPT | Performed by: EMERGENCY MEDICINE

## 2021-04-30 PROCEDURE — G0179 MD RECERTIFICATION HHA PT: HCPCS | Performed by: INTERNAL MEDICINE

## 2021-04-30 PROCEDURE — 71260 CT THORAX DX C+: CPT | Performed by: EMERGENCY MEDICINE

## 2021-04-30 PROCEDURE — 80048 BASIC METABOLIC PNL TOTAL CA: CPT | Performed by: EMERGENCY MEDICINE

## 2021-04-30 PROCEDURE — 85379 FIBRIN DEGRADATION QUANT: CPT | Performed by: EMERGENCY MEDICINE

## 2021-04-30 PROCEDURE — 83880 ASSAY OF NATRIURETIC PEPTIDE: CPT | Performed by: EMERGENCY MEDICINE

## 2021-04-30 PROCEDURE — 36415 COLL VENOUS BLD VENIPUNCTURE: CPT

## 2021-04-30 PROCEDURE — 1111F DSCHRG MED/CURRENT MED MERGE: CPT | Performed by: INTERNAL MEDICINE

## 2021-04-30 PROCEDURE — 93005 ELECTROCARDIOGRAM TRACING: CPT

## 2021-04-30 PROCEDURE — 99285 EMERGENCY DEPT VISIT HI MDM: CPT

## 2021-04-30 PROCEDURE — 71045 X-RAY EXAM CHEST 1 VIEW: CPT | Performed by: EMERGENCY MEDICINE

## 2021-04-30 PROCEDURE — 93970 EXTREMITY STUDY: CPT | Performed by: EMERGENCY MEDICINE

## 2021-04-30 PROCEDURE — 85025 COMPLETE CBC W/AUTO DIFF WBC: CPT | Performed by: EMERGENCY MEDICINE

## 2021-04-30 NOTE — ED QUICK NOTES
Pt pressed the call button to state she couldn't breath. , rr19 unlabored spo2 100% RA. Pt informed to take slow deep breaths and vitals were WNL. Pt insisted she needed oxygen. Education given. pt still requesting NC.  Pt placed on 1LNC and pt states she fe

## 2021-04-30 NOTE — ED PROVIDER NOTES
Patient Seen in: [facility] AND [facility] Emergency Department    History   Patient presents with:  Difficulty Breathing    Stated Complaint: chest tightness, sob    HPI    Patient presents for difficulty breathing going on for about 3 to 4 months which she fee Patch 72 Hr,  Place 1 patch onto the skin every third day. morphINE Sulfate IR 30 MG Oral Tab,  Take 1 tablet (30 mg total) by mouth every 4 to 6 hours as needed for Pain.   mirtazapine 30 MG Oral Tab,  Take 1 tablet (30 mg total) by mouth nightly.    nayla distress. Vital signs noted. Eye:  No scleral icterus. Eyelids appear normal, no lesions. Cardiovascular:  Normal S1 and S2, no murmur, regular, with good peripheral perfusion. She does have edema 2-3+ to bilateral lower extremities  Respiratory:    Sh following components:    RBC 3.00 (*)     HGB 8.1 (*)     HCT 25.2 (*)     RDW-SD 54.9 (*)     RDW 18.2 (*)     All other components within normal limits   TROPONIN I - Normal   PRO BETA NATRIURETIC PEPTIDE - Normal   RAPID SARS-COV-2 BY PCR - Normal   CBC

## 2021-04-30 NOTE — TELEPHONE ENCOUNTER
Toxicities: Osimertinib 160 mg daily    Dyspnea     Dyspnea: Grade 3 Adwoa Marin is stopping to catch her breath while talking. She denies chest pain, but feels her chest is tight. She keeps saying she needs oxygen.)    I asked Blas Scherer to call 911.  Jamee Romero

## 2021-05-05 ENCOUNTER — TELEPHONE (OUTPATIENT)
Dept: HEMATOLOGY/ONCOLOGY | Facility: HOSPITAL | Age: 76
End: 2021-05-05

## 2021-05-05 NOTE — TELEPHONE ENCOUNTER
[address] called [phone] and per [facility] only able to dispense 34 pills for 17 days and insurance only allowing 90 pills in a 67 day period. Dr. Kelvin Kang requesting Osimertinib 80 mg take 160mg ( 2 tabs) daily for 28 days quantity 56.  [facility] notifie

## 2021-05-05 NOTE — TELEPHONE ENCOUNTER
Corky Marr called asking to speak with a nurse. He says Mario Rodas is on tagrisso, and 56 pills were ordered for a 28 day period. He says they called for a refill, and they were told the insurence company only allows 34 pills for 17 days.

## 2021-05-06 ENCOUNTER — NURSE ONLY (OUTPATIENT)
Dept: HEMATOLOGY/ONCOLOGY | Facility: HOSPITAL | Age: 76
End: 2021-05-06
Attending: NURSE PRACTITIONER
Payer: COMMERCIAL

## 2021-05-06 ENCOUNTER — TELEPHONE (OUTPATIENT)
Dept: HEMATOLOGY/ONCOLOGY | Facility: HOSPITAL | Age: 76
End: 2021-05-06

## 2021-05-06 ENCOUNTER — OFFICE VISIT (OUTPATIENT)
Dept: HEMATOLOGY/ONCOLOGY | Facility: HOSPITAL | Age: 76
End: 2021-05-06
Attending: INTERNAL MEDICINE
Payer: COMMERCIAL

## 2021-05-06 VITALS
HEIGHT: 60 IN | BODY MASS INDEX: 26.5 KG/M2 | SYSTOLIC BLOOD PRESSURE: 116 MMHG | TEMPERATURE: 97 F | RESPIRATION RATE: 18 BRPM | DIASTOLIC BLOOD PRESSURE: 57 MMHG | HEART RATE: 78 BPM | WEIGHT: 135 LBS | OXYGEN SATURATION: 97 %

## 2021-05-06 VITALS
SYSTOLIC BLOOD PRESSURE: 116 MMHG | RESPIRATION RATE: 18 BRPM | TEMPERATURE: 97 F | WEIGHT: 135 LBS | OXYGEN SATURATION: 97 % | HEIGHT: 60 IN | DIASTOLIC BLOOD PRESSURE: 57 MMHG | BODY MASS INDEX: 26.5 KG/M2 | HEART RATE: 78 BPM

## 2021-05-06 DIAGNOSIS — F41.9 ANXIETY: ICD-10-CM

## 2021-05-06 DIAGNOSIS — C34.32 MALIGNANT NEOPLASM OF LOWER LOBE OF LEFT LUNG (HCC): ICD-10-CM

## 2021-05-06 DIAGNOSIS — C79.51 METASTASIS TO VERTEBRAL COLUMN OF UNKNOWN ORIGIN (HCC): ICD-10-CM

## 2021-05-06 DIAGNOSIS — C80.1 METASTASIS TO VERTEBRAL COLUMN OF UNKNOWN ORIGIN (HCC): ICD-10-CM

## 2021-05-06 DIAGNOSIS — G89.3 CANCER RELATED PAIN: Primary | ICD-10-CM

## 2021-05-06 DIAGNOSIS — C34.32 MALIGNANT NEOPLASM OF LOWER LOBE OF LEFT LUNG (HCC): Primary | ICD-10-CM

## 2021-05-06 DIAGNOSIS — C34.90 PRIMARY MALIGNANT NEOPLASM OF LUNG METASTATIC TO OTHER SITE, UNSPECIFIED LATERALITY (HCC): ICD-10-CM

## 2021-05-06 DIAGNOSIS — D64.9 ANEMIA, UNSPECIFIED TYPE: ICD-10-CM

## 2021-05-06 DIAGNOSIS — C34.90 PRIMARY MALIGNANT NEOPLASM OF LUNG METASTATIC TO OTHER SITE (HCC): ICD-10-CM

## 2021-05-06 DIAGNOSIS — Z51.81 MEDICATION MONITORING ENCOUNTER: ICD-10-CM

## 2021-05-06 DIAGNOSIS — Z71.89 ADVANCE CARE PLANNING: ICD-10-CM

## 2021-05-06 DIAGNOSIS — Z51.81 ENCOUNTER FOR MEDICATION MONITORING: ICD-10-CM

## 2021-05-06 DIAGNOSIS — C79.31 BRAIN METASTASIS (HCC): ICD-10-CM

## 2021-05-06 DIAGNOSIS — Z71.89 GOALS OF CARE, COUNSELING/DISCUSSION: ICD-10-CM

## 2021-05-06 DIAGNOSIS — K59.03 THERAPEUTIC OPIOID INDUCED CONSTIPATION: ICD-10-CM

## 2021-05-06 DIAGNOSIS — T40.2X5A THERAPEUTIC OPIOID INDUCED CONSTIPATION: ICD-10-CM

## 2021-05-06 PROCEDURE — 82607 VITAMIN B-12: CPT

## 2021-05-06 PROCEDURE — 99215 OFFICE O/P EST HI 40 MIN: CPT | Performed by: INTERNAL MEDICINE

## 2021-05-06 PROCEDURE — 80053 COMPREHEN METABOLIC PANEL: CPT

## 2021-05-06 PROCEDURE — 99417 PROLNG OP E/M EACH 15 MIN: CPT | Performed by: NURSE PRACTITIONER

## 2021-05-06 PROCEDURE — 83540 ASSAY OF IRON: CPT

## 2021-05-06 PROCEDURE — 85025 COMPLETE CBC W/AUTO DIFF WBC: CPT

## 2021-05-06 PROCEDURE — 84466 ASSAY OF TRANSFERRIN: CPT

## 2021-05-06 PROCEDURE — 99215 OFFICE O/P EST HI 40 MIN: CPT | Performed by: NURSE PRACTITIONER

## 2021-05-06 PROCEDURE — 36415 COLL VENOUS BLD VENIPUNCTURE: CPT

## 2021-05-06 PROCEDURE — 83735 ASSAY OF MAGNESIUM: CPT

## 2021-05-06 RX ORDER — MORPHINE SULFATE 30 MG/1
30 TABLET ORAL
Qty: 150 TABLET | Refills: 0 | Status: SHIPPED | OUTPATIENT
Start: 2021-05-06 | End: 2021-06-15

## 2021-05-06 RX ORDER — FENTANYL 75 UG/H
1 PATCH TRANSDERMAL
Qty: 10 PATCH | Refills: 0 | Status: SHIPPED | OUTPATIENT
Start: 2021-05-06 | End: 2021-05-27

## 2021-05-06 NOTE — PATIENT INSTRUCTIONS
Recent Results (from the past 24 hour(s))   COMP METABOLIC PANEL (14)    Collection Time: 05/06/21  1:30 PM   Result Value Ref Range    Glucose 99 70 - 99 mg/dL    Sodium 136 136 - 145 mmol/L    Potassium 3.8 3.5 - 5.1 mmol/L    Chloride 103 98 - 112 mmol/

## 2021-05-06 NOTE — PROGRESS NOTES
KIYA Dexter is a 68year old female here for follow up of Malignant neoplasm of lower lobe of left lung (hcc)  (primary encounter diagnosis)  Brain metastasis (hcc)  Metastasis to vertebral column of unknown origin (hcc)  Primary malignant walker) and numbness (LLE mild on L.  R LE more due to spasms. ). Psychiatric/Behavioral: Positive for sleep disturbance. The patient is nervous/anxious.           Current Outpatient Medications   Medication Sig Dispense Refill   • furosemide 20 MG Oral T blood pressure    • HTN (hypertension)    • Lipid screening 4/29/2014    per:NG   • Ophthalmological disorder 2007    IOU increased C/D ratio   • Vitreous floaters 2007    OD     Past Surgical History:   Procedure Laterality Date   • CATARACT     • [facility] Oropharynx is clear. Neck: No JVD. No palpable lymphadenopathy. Neck is supple. Chest: Clear to auscultation. Heart: Regular rate and rhythm. Abdomen: Soft, non tender with good bowel sounds. Extremities: No edema. Neurological: Grossly intact.    L evaluated further with further assessment as below. Continue with osimertinib 160 mg po daily. RTC in 4 weeks with PET/CT, and MRI of the brain to assess response to therapy.       2)  Pathologic compression fracture of spine, initial encounter ([facility] [address] Time: 05/06/21  1:30 PM   Result Value Ref Range    WBC 3.9 (L) 4.0 - 11.0 x10(3) uL    RBC 3.04 (L) 3.80 - 5.30 x10(6)uL    HGB 8.1 (L) 12.0 - 16.0 g/dL    HCT 26.3 (L) 35.0 - 48.0 %    MCV 86.5 80.0 - 100.0 fL    MCH 26.6 26.0 - 34.0 pg    MCHC 30.8 (L) (CPT=72148), 2/01/2021, 10:13 AM.  [facility], PET/CT STANDARD BODY SCAN (MNO=[id number]),   2/11/2021, 9:13 AM.  [facility], CT SPINE LUMBAR (CPT=72131), 3/04/2021, 6:42 PM.  [facility], CT SPINE LUMBAR (CPT=7213 are present about the aortic arch.    LUNGS/PLEURA: In the left lower lobe, there is suggestion of a residual mass/masslike airspace consolidation measuring up to 1.8 x 2.0 x 1.5 cm (series 3, image 56; series 6, image 84), decreased in conspicuity from pre 02/05/2021.      3. Multiloculated left pleural effusion, potentially malignant. 4. Extensive metastatic disease of the vertebral column with partially delineated thoracolumbar fusion and lumbar corpectomy.       5. Nonspecific heterogeneous appearance

## 2021-05-07 ENCOUNTER — TELEPHONE (OUTPATIENT)
Dept: HEMATOLOGY/ONCOLOGY | Facility: HOSPITAL | Age: 76
End: 2021-05-07

## 2021-05-07 NOTE — TELEPHONE ENCOUNTER
Returned call to both patient and Annamarie in [place] scheduling. Discussed with patient's  that she will be medicated with assistance of ANA MARIA GARLAND also discussed with Dr. Naya Benson that she prefers patient not have IV sedation.   ANA MARIA Bravo will reach out to

## 2021-05-07 NOTE — TELEPHONE ENCOUNTER
Annamarie from [facility] called and said that the patient wanted to be sedated during her Pet Scan and MRI. Annamarie said that the doctor needed to add  \"sedation\" to the order. Annamarie's direct line to [phone].

## 2021-05-07 NOTE — TELEPHONE ENCOUNTER
Patient's  called and asked if when his wife get's the PET Scan and MRI can Dr. Aleja Barron prescribe a Morphine IV because patient has Spinal disc protrusion protruding on her back. It will be too difficult to lay down for a long period of time. Also, I scheduled patient for July 1, 2021 at 3:00 p.m. Could he possibly get anything sooner.

## 2021-05-10 ENCOUNTER — TELEPHONE (OUTPATIENT)
Dept: HEMATOLOGY/ONCOLOGY | Facility: HOSPITAL | Age: 76
End: 2021-05-10

## 2021-05-10 ENCOUNTER — TELEPHONE (OUTPATIENT)
Dept: INTERNAL MEDICINE CLINIC | Facility: CLINIC | Age: 76
End: 2021-05-10

## 2021-05-10 ENCOUNTER — OFFICE VISIT (OUTPATIENT)
Dept: INTERNAL MEDICINE CLINIC | Facility: CLINIC | Age: 76
End: 2021-05-10
Payer: COMMERCIAL

## 2021-05-10 VITALS
HEART RATE: 83 BPM | OXYGEN SATURATION: 97 % | WEIGHT: 135 LBS | DIASTOLIC BLOOD PRESSURE: 75 MMHG | HEIGHT: 61 IN | SYSTOLIC BLOOD PRESSURE: 120 MMHG | BODY MASS INDEX: 25.49 KG/M2

## 2021-05-10 DIAGNOSIS — K59.03 DRUG-INDUCED CONSTIPATION: ICD-10-CM

## 2021-05-10 DIAGNOSIS — L24.4 IRRITANT CONTACT DERMATITIS DUE TO DRUG IN CONTACT WITH SKIN: ICD-10-CM

## 2021-05-10 DIAGNOSIS — Z12.31 VISIT FOR SCREENING MAMMOGRAM: ICD-10-CM

## 2021-05-10 DIAGNOSIS — R60.0 PEDAL EDEMA: Primary | ICD-10-CM

## 2021-05-10 DIAGNOSIS — C34.92 PRIMARY MALIGNANT NEOPLASM OF LEFT LUNG METASTATIC TO OTHER SITE (HCC): ICD-10-CM

## 2021-05-10 DIAGNOSIS — D63.8 ANEMIA OF CHRONIC ILLNESS: ICD-10-CM

## 2021-05-10 DIAGNOSIS — Z12.11 COLON CANCER SCREENING: ICD-10-CM

## 2021-05-10 DIAGNOSIS — I10 ESSENTIAL HYPERTENSION: ICD-10-CM

## 2021-05-10 PROCEDURE — 3074F SYST BP LT 130 MM HG: CPT | Performed by: INTERNAL MEDICINE

## 2021-05-10 PROCEDURE — 3078F DIAST BP <80 MM HG: CPT | Performed by: INTERNAL MEDICINE

## 2021-05-10 PROCEDURE — 1111F DSCHRG MED/CURRENT MED MERGE: CPT | Performed by: INTERNAL MEDICINE

## 2021-05-10 PROCEDURE — 3008F BODY MASS INDEX DOCD: CPT | Performed by: INTERNAL MEDICINE

## 2021-05-10 PROCEDURE — 99214 OFFICE O/P EST MOD 30 MIN: CPT | Performed by: INTERNAL MEDICINE

## 2021-05-10 RX ORDER — LOSARTAN POTASSIUM 50 MG/1
25 TABLET ORAL DAILY
Qty: 30 TABLET | Refills: 3 | Status: SHIPPED | OUTPATIENT
Start: 2021-05-10 | End: 2021-06-23 | Stop reason: ALTCHOICE

## 2021-05-10 RX ORDER — FUROSEMIDE 20 MG/1
TABLET ORAL
Qty: 90 TABLET | Refills: 0 | Status: SHIPPED | OUTPATIENT
Start: 2021-05-10 | End: 2021-08-06

## 2021-05-10 RX ORDER — POTASSIUM CHLORIDE 750 MG/1
TABLET, FILM COATED, EXTENDED RELEASE ORAL
Qty: 90 TABLET | Refills: 0 | Status: SHIPPED | OUTPATIENT
Start: 2021-05-10 | End: 2021-06-11 | Stop reason: DRUGHIGH

## 2021-05-10 RX ORDER — FUROSEMIDE 20 MG/1
20 TABLET ORAL DAILY
Qty: 14 TABLET | Refills: 0 | OUTPATIENT
Start: 2021-05-10 | End: 2021-01-01

## 2021-05-10 RX ORDER — SIMVASTATIN 10 MG
TABLET ORAL
Qty: 90 TABLET | Refills: 4 | Status: SHIPPED | OUTPATIENT
Start: 2021-05-10 | End: 2021-09-23

## 2021-05-10 NOTE — TELEPHONE ENCOUNTER
called requesting if possible refills for pts furosemide 20 MG Oral Tab  pts leg is still a bit swollen    wants to be informed when this has been sent

## 2021-05-10 NOTE — TELEPHONE ENCOUNTER
Returned phone call and notified that [employer]  from authorization called [facility] and may call for refill of Osimertinib on 5/18/21 for the other half of refill. Going forward will be able to have whole script filled at a time with a 0 copay. Spouse verbalizes understanding.

## 2021-05-10 NOTE — TELEPHONE ENCOUNTER
Patient's  calling for a refill on the Lasix 20 mg and stated they are having some issues with the insurance over the Tagrisso medication and would like to discuss this matter with Brianna Rosales.

## 2021-05-10 NOTE — TELEPHONE ENCOUNTER
Returned phone call and notified that received a today that pt qualified for the [id number] and Me program for a 0 dollar copay per our authorization team. Informed will have our authorization department call to explain program. Pt has not been able to get a refill of full prescription due to insurance issues in the past. Spoke with Breezy Thomas in authorization and will clarify.

## 2021-05-10 NOTE — TELEPHONE ENCOUNTER
Contacted patient to let her know, she needs to follow-up post ER visit, before refills for furosemide 20 MG.

## 2021-05-10 NOTE — PROGRESS NOTES
HPI:    Patient ID: Margaux Estevez is a 68year old female. HPI     Patient has left lower lung ca with mets to brain, vertebral column. She is on   osimertinib 160 mg po daily since 3/17/21     She was seen at ED 2 x for ATKINS and leg swelling  .  D Lymphocytes %      % 27.8   Monocytes %      % 15.1   Eosinophils %      % 4.2   Basophils %      % 0.8   Immature Granulocyte %      % 0.3   Glucose      70 - 99 mg/dL 99   Sodium      136 - 145 mmol/L 136   Potassium      3.5 - 5.1 mmol/L 3.8   Chlorid   BUN/CREA Ratio 8.3 (*)       All other components within normal limits   D-DIMER - Abnormal; Notable for the following components:     D-Dimer 5.09 (*)       All other components within normal limits   CBC W/ DIFFERENTIAL - Abnormal; Notable for the fo (FLEX-A-MIN JOINT FLEX OR) Take by mouth. Takes two tablets daily     • Vitamin D3 (VITAMIN D3) 2000 UNITS Oral Cap Take 2,000 Units by mouth daily. • Multiple Vitamins Oral Tab Take 1 tablet by mouth daily.          Allergies:No Known Allergies    HI appetite change and fatigue. Respiratory: Negative for cough, chest tightness and shortness of breath. Cardiovascular: Positive for leg swelling. Negative for chest pain. Gastrointestinal: Positive for constipation. Negative for blood in stool.    Ge capsule (1 g total) by mouth daily. 90 capsule 3   • Misc Natural Products (FLEX-A-MIN JOINT FLEX OR) Take by mouth. Takes two tablets daily     • Vitamin D3 (VITAMIN D3) 2000 UNITS Oral Cap Take 2,000 Units by mouth daily.        • Multiple Vitamins Oral T alert and oriented to person, place, and time. ASSESSMENT/PLAN:   1. Pedal edema  Venous insufficiency contributing to pedal edema  Apply tubigrip  Furosemide 20 mgs every day prn for pedal edema.  KCL 10 meq every day to be taken with furos

## 2021-05-17 ENCOUNTER — TELEPHONE (OUTPATIENT)
Dept: INTERNAL MEDICINE CLINIC | Facility: CLINIC | Age: 76
End: 2021-05-17

## 2021-05-17 ENCOUNTER — LAB ENCOUNTER (OUTPATIENT)
Dept: LAB | Facility: HOSPITAL | Age: 76
End: 2021-05-17
Attending: INTERNAL MEDICINE
Payer: COMMERCIAL

## 2021-05-17 DIAGNOSIS — R60.0 PEDAL EDEMA: ICD-10-CM

## 2021-05-17 DIAGNOSIS — R60.0 PEDAL EDEMA: Primary | ICD-10-CM

## 2021-05-17 DIAGNOSIS — Z51.81 ENCOUNTER FOR MEDICATION MONITORING: ICD-10-CM

## 2021-05-17 PROCEDURE — 80048 BASIC METABOLIC PNL TOTAL CA: CPT

## 2021-05-17 PROCEDURE — 85025 COMPLETE CBC W/AUTO DIFF WBC: CPT

## 2021-05-17 PROCEDURE — 36415 COLL VENOUS BLD VENIPUNCTURE: CPT

## 2021-05-17 NOTE — TELEPHONE ENCOUNTER
PT called requesting to speak with nurse in regards questions she has for losartan Potassium 50 MG Oral Tab  And Lasix  Please call back and adivse

## 2021-05-17 NOTE — TELEPHONE ENCOUNTER
Spoke to  Kai De La Rosa. Pt is on Furosemide 20 mg and Potassium 10 meq. Still has ankle swelling, but pt c/o palpitations.    Has increased urine production/output (d/t lasix) --  concerned this is affecting potassium and thus causing the palpitati

## 2021-05-18 ENCOUNTER — TELEPHONE (OUTPATIENT)
Dept: HEMATOLOGY/ONCOLOGY | Facility: HOSPITAL | Age: 76
End: 2021-05-18

## 2021-05-18 NOTE — TELEPHONE ENCOUNTER
[facility] sent [facility] to get clarification on the patient's Tagrisso 80mg prescription Qty: 56 and Refills: 11. There are no directions on the rx dated 5/6/21.  Please call with clarified directions at [phone] or fax an updated prescription to [phone]

## 2021-05-24 ENCOUNTER — TELEPHONE (OUTPATIENT)
Dept: HEMATOLOGY/ONCOLOGY | Facility: HOSPITAL | Age: 76
End: 2021-05-24

## 2021-05-24 DIAGNOSIS — G89.3 CANCER RELATED PAIN: ICD-10-CM

## 2021-05-24 DIAGNOSIS — C80.1 METASTASIS TO VERTEBRAL COLUMN OF UNKNOWN ORIGIN (HCC): ICD-10-CM

## 2021-05-24 DIAGNOSIS — C79.51 METASTASIS TO VERTEBRAL COLUMN OF UNKNOWN ORIGIN (HCC): ICD-10-CM

## 2021-05-24 RX ORDER — GABAPENTIN 100 MG/1
100 CAPSULE ORAL 3 TIMES DAILY
Qty: 90 CAPSULE | Refills: 1 | Status: SHIPPED | OUTPATIENT
Start: 2021-05-24 | End: 2021-06-23 | Stop reason: DRUGHIGH

## 2021-05-24 RX ORDER — GABAPENTIN 100 MG/1
100 CAPSULE ORAL 3 TIMES DAILY
Qty: 90 CAPSULE | Refills: 1 | Status: CANCELLED | OUTPATIENT
Start: 2021-05-24

## 2021-05-24 RX ORDER — FENTANYL 75 UG/H
1 PATCH TRANSDERMAL
Qty: 10 PATCH | Refills: 0 | OUTPATIENT
Start: 2021-05-24

## 2021-05-24 NOTE — TELEPHONE ENCOUNTER
Pat's , Toro Leyva, called. He would like to speak with Miguel Mullers regarding his wife's condition. Please call.

## 2021-05-24 NOTE — TELEPHONE ENCOUNTER
Pat's , Yelena Rebolledo, called. He would like to speak with Edie Thomas regarding one of Pat's medications. Please call.

## 2021-05-24 NOTE — TELEPHONE ENCOUNTER
I spoke with Cintia Sheth and Eugenia Pavon via telephone. They have moved back into their Condo in [place] since Cintia Sheth is feeling better and is more mobile. Cintia Meryl requesting refills on Gabapentin 100mg tabs and Fentanyl 75mcg/hr patches.     I sent in refill for Ga

## 2021-05-25 ENCOUNTER — TELEPHONE (OUTPATIENT)
Dept: HEMATOLOGY/ONCOLOGY | Facility: HOSPITAL | Age: 76
End: 2021-05-25

## 2021-05-25 NOTE — TELEPHONE ENCOUNTER
Patient needs appointment with Cliff Esqueda. She has an appointment with Dr. Daria Mcgovern at 6/15/21 at 11:30 a.m.. (30 min.  Appt) with Dr. Daria Mcgovern

## 2021-05-27 DIAGNOSIS — C80.1 METASTASIS TO VERTEBRAL COLUMN OF UNKNOWN ORIGIN (HCC): ICD-10-CM

## 2021-05-27 DIAGNOSIS — C79.51 METASTASIS TO VERTEBRAL COLUMN OF UNKNOWN ORIGIN (HCC): ICD-10-CM

## 2021-05-27 DIAGNOSIS — G89.3 CANCER RELATED PAIN: ICD-10-CM

## 2021-05-27 RX ORDER — FENTANYL 75 UG/H
1 PATCH TRANSDERMAL
Qty: 10 PATCH | Refills: 0 | Status: SHIPPED | OUTPATIENT
Start: 2021-05-27 | End: 2021-06-25

## 2021-06-03 ENCOUNTER — TELEPHONE (OUTPATIENT)
Dept: HEMATOLOGY/ONCOLOGY | Facility: HOSPITAL | Age: 76
End: 2021-06-03

## 2021-06-03 NOTE — TELEPHONE ENCOUNTER
I called and spoke with Kayden Major re: premedication plan for PET and MRI scans on 6/9 and 6/10.     Premedication plan to help with anxiety and fear of claustrophobia discussed with Kayden Major and is as follows:    -Selam Shaffer is to take 1 tablet MS IR 30mg before leavin

## 2021-06-07 ENCOUNTER — TELEPHONE (OUTPATIENT)
Dept: HEMATOLOGY/ONCOLOGY | Facility: HOSPITAL | Age: 76
End: 2021-06-07

## 2021-06-07 NOTE — TELEPHONE ENCOUNTER
I received a call from Blas Scherer regarding the premedication plan for Pat's PET scan on Wednesday, 6/9/2021 and CT and MRI scans on Thursday, 6/10/2021.     Blas Scherer states that Ryley Solorzano is concerned that taking morphine and alprazolam at the same time will make he

## 2021-06-08 ENCOUNTER — TELEPHONE (OUTPATIENT)
Dept: HEMATOLOGY/ONCOLOGY | Facility: HOSPITAL | Age: 76
End: 2021-06-08

## 2021-06-08 DIAGNOSIS — C79.51 METASTASIS TO VERTEBRAL COLUMN OF UNKNOWN ORIGIN (HCC): ICD-10-CM

## 2021-06-08 DIAGNOSIS — G89.3 CANCER RELATED PAIN: ICD-10-CM

## 2021-06-08 DIAGNOSIS — C80.1 METASTASIS TO VERTEBRAL COLUMN OF UNKNOWN ORIGIN (HCC): ICD-10-CM

## 2021-06-08 RX ORDER — FENTANYL 75 UG/H
1 PATCH TRANSDERMAL
Qty: 10 PATCH | Refills: 0 | OUTPATIENT
Start: 2021-06-08

## 2021-06-08 NOTE — TELEPHONE ENCOUNTER
Spoke with Pharmacist at [facility] on [address]. Fentanyl is now in stock as of this morning. It will be ready for  this afternoon. Please call Kayden Major ([email or web address] ) to let him know. Thank you.

## 2021-06-08 NOTE — TELEPHONE ENCOUNTER
LVM message on Jacob's phone. I let him know that he should contact [facility] who can run a search to see which pharmacies have Fentanyl 75 mcg/hr in stock. Asked him to please call me and let me know which [facility] to resend script to.      Awaiting

## 2021-06-08 NOTE — TELEPHONE ENCOUNTER
Liyl montez says Meggan Delgadillo is on her last patch fentaNYL 75 MCG/HR Transdermal Patch 67 Hr. He said rufino can find a pharmacy that has these right away.  Thank you torrey

## 2021-06-08 NOTE — TELEPHONE ENCOUNTER
Peyton Azevedo is sending this after hour message for Awilda Delaney regarding getting her Rx refill, She have been waiting since 5/24/21 please call.

## 2021-06-09 ENCOUNTER — HOSPITAL ENCOUNTER (OUTPATIENT)
Dept: NUCLEAR MEDICINE | Facility: HOSPITAL | Age: 76
Discharge: HOME | End: 2021-06-09
Attending: INTERNAL MEDICINE
Payer: COMMERCIAL

## 2021-06-09 DIAGNOSIS — C80.1 METASTASIS TO VERTEBRAL COLUMN OF UNKNOWN ORIGIN (HCC): ICD-10-CM

## 2021-06-09 DIAGNOSIS — C34.90 PRIMARY MALIGNANT NEOPLASM OF LUNG METASTATIC TO OTHER SITE, UNSPECIFIED LATERALITY (HCC): ICD-10-CM

## 2021-06-09 DIAGNOSIS — C79.51 METASTASIS TO VERTEBRAL COLUMN OF UNKNOWN ORIGIN (HCC): ICD-10-CM

## 2021-06-09 DIAGNOSIS — C34.32 MALIGNANT NEOPLASM OF LOWER LOBE OF LEFT LUNG (HCC): ICD-10-CM

## 2021-06-09 DIAGNOSIS — Z51.81 ENCOUNTER FOR MEDICATION MONITORING: ICD-10-CM

## 2021-06-09 PROCEDURE — 82962 GLUCOSE BLOOD TEST: CPT

## 2021-06-09 PROCEDURE — 78815 PET IMAGE W/CT SKULL-THIGH: CPT | Performed by: INTERNAL MEDICINE

## 2021-06-10 ENCOUNTER — HOSPITAL ENCOUNTER (OUTPATIENT)
Dept: MRI IMAGING | Facility: HOSPITAL | Age: 76
Discharge: HOME | End: 2021-06-10
Attending: INTERNAL MEDICINE
Payer: COMMERCIAL

## 2021-06-10 ENCOUNTER — HOSPITAL ENCOUNTER (OUTPATIENT)
Dept: CT IMAGING | Facility: HOSPITAL | Age: 76
Discharge: HOME | End: 2021-06-10
Attending: NEUROLOGICAL SURGERY
Payer: COMMERCIAL

## 2021-06-10 DIAGNOSIS — S32.018A OTH FRACTURE OF FIRST LUMBAR VERTEBRA, INIT FOR CLOS FX (HCC): ICD-10-CM

## 2021-06-10 DIAGNOSIS — C34.32 MALIGNANT NEOPLASM OF LOWER LOBE OF LEFT LUNG (HCC): ICD-10-CM

## 2021-06-10 DIAGNOSIS — C79.31 BRAIN METASTASIS (HCC): ICD-10-CM

## 2021-06-10 PROCEDURE — A9575 INJ GADOTERATE MEGLUMI 0.1ML: HCPCS | Performed by: INTERNAL MEDICINE

## 2021-06-10 PROCEDURE — 70553 MRI BRAIN STEM W/O & W/DYE: CPT | Performed by: INTERNAL MEDICINE

## 2021-06-10 PROCEDURE — 72131 CT LUMBAR SPINE W/O DYE: CPT | Performed by: NEUROLOGICAL SURGERY

## 2021-06-11 ENCOUNTER — TELEPHONE (OUTPATIENT)
Dept: INTERNAL MEDICINE CLINIC | Facility: CLINIC | Age: 76
End: 2021-06-11

## 2021-06-11 RX ORDER — POTASSIUM CHLORIDE 1500 MG/1
20 TABLET, FILM COATED, EXTENDED RELEASE ORAL DAILY
Qty: 90 TABLET | Refills: 1 | Status: SHIPPED | OUTPATIENT
Start: 2021-06-11 | End: 2021-07-11

## 2021-06-11 NOTE — TELEPHONE ENCOUNTER
Potassium Chloride ER 10 MEQ Oral Tab CR  5/10/2021  0 ordered          Summary: Take 1 tab with furosemide daily, Normal, Disp-90 tablet, R-0  Please give her smaller size Pill     Ord/Sold: 5/10/2021 (O)  Report  Long-term:   Pharmacy: [address] [place] [address]

## 2021-06-11 NOTE — TELEPHONE ENCOUNTER
called requesting refills for Potassium Chloride ER 10 MEQ Oral Tab CR, since it was increased to 20meq and now pt does not have enough   stated that R Jay [address] knows of this change and would like to speak to her  Please call back and advise

## 2021-06-14 ENCOUNTER — TELEPHONE (OUTPATIENT)
Dept: INTERNAL MEDICINE CLINIC | Facility: CLINIC | Age: 76
End: 2021-06-14

## 2021-06-14 ENCOUNTER — TELEPHONE (OUTPATIENT)
Dept: HEMATOLOGY/ONCOLOGY | Facility: HOSPITAL | Age: 76
End: 2021-06-14

## 2021-06-14 DIAGNOSIS — M48.52XD: ICD-10-CM

## 2021-06-14 DIAGNOSIS — R06.00 DOE (DYSPNEA ON EXERTION): ICD-10-CM

## 2021-06-14 DIAGNOSIS — C34.92 PRIMARY MALIGNANT NEOPLASM OF LEFT LUNG METASTATIC TO OTHER SITE (HCC): ICD-10-CM

## 2021-06-14 DIAGNOSIS — E78.2 MIXED HYPERLIPIDEMIA: ICD-10-CM

## 2021-06-14 DIAGNOSIS — E78.5 HYPERLIPIDEMIA LDL GOAL <100: ICD-10-CM

## 2021-06-14 DIAGNOSIS — C79.31 BRAIN METASTASIS (HCC): ICD-10-CM

## 2021-06-14 DIAGNOSIS — R60.0 PEDAL EDEMA: ICD-10-CM

## 2021-06-14 DIAGNOSIS — I10 ESSENTIAL HYPERTENSION: Primary | ICD-10-CM

## 2021-06-14 DIAGNOSIS — E27.8 LEFT ADRENAL MASS (HCC): ICD-10-CM

## 2021-06-14 DIAGNOSIS — D63.8 ANEMIA OF CHRONIC ILLNESS: ICD-10-CM

## 2021-06-14 DIAGNOSIS — M25.562 CHRONIC PAIN OF LEFT KNEE: ICD-10-CM

## 2021-06-14 DIAGNOSIS — R53.1 WEAKNESS GENERALIZED: ICD-10-CM

## 2021-06-14 DIAGNOSIS — G89.29 CHRONIC PAIN OF LEFT KNEE: ICD-10-CM

## 2021-06-14 NOTE — TELEPHONE ENCOUNTER
Outpatient Medication Detail     Disp Refills Start End    Potassium Chloride ER 20 MEQ Oral Tab CR 90 tablet 1 6/11/2021 7/11/2021    Sig - Route: Take 20 mEq by mouth daily.  To be taken with furosemide. - Oral    Sent to pharmacy as: Potassium Chloride E

## 2021-06-14 NOTE — TELEPHONE ENCOUNTER
Luann Beltre is calling on the behalf of home health for the patient who previously refused home health services but has now changed her mind and would need new orders in order to start care. Patient needs PT and OT. Fax number [phone]. Please advise.

## 2021-06-14 NOTE — TELEPHONE ENCOUNTER
Normally you give her 64 she only got 34 pills. Pharmacy told them that Naveed Lewis told them to dispence only 34 pills. [facility] has ppt tomorrow should she eat breakfast or should she fast for this.

## 2021-06-14 NOTE — TELEPHONE ENCOUNTER
Returned phone call. Informed patient does not need to fast for labs. Informed that I spoke with [facility] and per  insurance only wants to  dispense 34 pills with plan. Last month was able to get the 56 pills.  Discussed with call authorization to check on statu

## 2021-06-15 ENCOUNTER — NURSE ONLY (OUTPATIENT)
Dept: HEMATOLOGY/ONCOLOGY | Facility: HOSPITAL | Age: 76
End: 2021-06-15
Attending: INTERNAL MEDICINE
Payer: COMMERCIAL

## 2021-06-15 ENCOUNTER — OFFICE VISIT (OUTPATIENT)
Dept: HEMATOLOGY/ONCOLOGY | Facility: HOSPITAL | Age: 76
End: 2021-06-15
Attending: NURSE PRACTITIONER
Payer: COMMERCIAL

## 2021-06-15 VITALS
HEIGHT: 60 IN | DIASTOLIC BLOOD PRESSURE: 56 MMHG | TEMPERATURE: 99 F | HEART RATE: 72 BPM | OXYGEN SATURATION: 99 % | WEIGHT: 125 LBS | BODY MASS INDEX: 24.54 KG/M2 | SYSTOLIC BLOOD PRESSURE: 129 MMHG | RESPIRATION RATE: 16 BRPM

## 2021-06-15 VITALS
SYSTOLIC BLOOD PRESSURE: 129 MMHG | DIASTOLIC BLOOD PRESSURE: 56 MMHG | HEART RATE: 72 BPM | RESPIRATION RATE: 16 BRPM | OXYGEN SATURATION: 99 % | HEIGHT: 60 IN | TEMPERATURE: 99 F | WEIGHT: 125 LBS | BODY MASS INDEX: 24.54 KG/M2

## 2021-06-15 DIAGNOSIS — C80.1 METASTASIS TO VERTEBRAL COLUMN OF UNKNOWN ORIGIN (HCC): ICD-10-CM

## 2021-06-15 DIAGNOSIS — C34.32 MALIGNANT NEOPLASM OF LOWER LOBE OF LEFT LUNG (HCC): ICD-10-CM

## 2021-06-15 DIAGNOSIS — C34.90 PRIMARY MALIGNANT NEOPLASM OF LUNG METASTATIC TO OTHER SITE (HCC): ICD-10-CM

## 2021-06-15 DIAGNOSIS — M62.838 MUSCLE SPASM: ICD-10-CM

## 2021-06-15 DIAGNOSIS — C34.32 MALIGNANT NEOPLASM OF LOWER LOBE OF LEFT LUNG (HCC): Primary | ICD-10-CM

## 2021-06-15 DIAGNOSIS — C79.51 METASTASIS TO VERTEBRAL COLUMN OF UNKNOWN ORIGIN (HCC): ICD-10-CM

## 2021-06-15 DIAGNOSIS — F41.9 ANXIETY: ICD-10-CM

## 2021-06-15 DIAGNOSIS — G47.9 DIFFICULTY SLEEPING: ICD-10-CM

## 2021-06-15 DIAGNOSIS — K59.03 THERAPEUTIC OPIOID INDUCED CONSTIPATION: ICD-10-CM

## 2021-06-15 DIAGNOSIS — G89.3 CANCER RELATED PAIN: Primary | ICD-10-CM

## 2021-06-15 DIAGNOSIS — Z71.89 ADVANCE CARE PLANNING: ICD-10-CM

## 2021-06-15 DIAGNOSIS — Z51.81 MEDICATION MONITORING ENCOUNTER: ICD-10-CM

## 2021-06-15 DIAGNOSIS — C79.31 BRAIN METASTASIS (HCC): ICD-10-CM

## 2021-06-15 DIAGNOSIS — F32.A DEPRESSION, UNSPECIFIED DEPRESSION TYPE: ICD-10-CM

## 2021-06-15 DIAGNOSIS — Z51.81 ENCOUNTER FOR MEDICATION MONITORING: ICD-10-CM

## 2021-06-15 DIAGNOSIS — T40.2X5A THERAPEUTIC OPIOID INDUCED CONSTIPATION: ICD-10-CM

## 2021-06-15 DIAGNOSIS — Z71.89 GOALS OF CARE, COUNSELING/DISCUSSION: ICD-10-CM

## 2021-06-15 PROCEDURE — 99215 OFFICE O/P EST HI 40 MIN: CPT | Performed by: INTERNAL MEDICINE

## 2021-06-15 PROCEDURE — 99215 OFFICE O/P EST HI 40 MIN: CPT | Performed by: NURSE PRACTITIONER

## 2021-06-15 PROCEDURE — 83735 ASSAY OF MAGNESIUM: CPT

## 2021-06-15 PROCEDURE — 36415 COLL VENOUS BLD VENIPUNCTURE: CPT

## 2021-06-15 PROCEDURE — 85025 COMPLETE CBC W/AUTO DIFF WBC: CPT

## 2021-06-15 PROCEDURE — 80053 COMPREHEN METABOLIC PANEL: CPT

## 2021-06-15 RX ORDER — MIRTAZAPINE 30 MG/1
30 TABLET, FILM COATED ORAL NIGHTLY
Qty: 30 TABLET | Refills: 1 | Status: SHIPPED | OUTPATIENT
Start: 2021-06-15 | End: 2021-09-29

## 2021-06-15 RX ORDER — MORPHINE SULFATE 30 MG/1
30 TABLET ORAL
Qty: 150 TABLET | Refills: 0 | Status: ON HOLD | OUTPATIENT
Start: 2021-06-15 | End: 2021-07-17

## 2021-06-15 RX ORDER — METHOCARBAMOL 750 MG/1
750 TABLET, FILM COATED ORAL 3 TIMES DAILY PRN
Qty: 30 TABLET | Refills: 0 | Status: SHIPPED | OUTPATIENT
Start: 2021-06-15 | End: 2021-06-22

## 2021-06-15 NOTE — PATIENT INSTRUCTIONS
Needs dental evaluation prior to Xgeva due to risk of osteonecrosis of the Jaw.        Recent Results (from the past 48 hour(s))   COMP METABOLIC PANEL (14)    Collection Time: 06/15/21 10:34 AM   Result Value Ref Range    Glucose 102 (H) 70 - 99 mg/dL    S % 0.3 %     PROCEDURE: PET/CT STANDARD BODY SCAN (LMM=97263)      COMPARISON: [facility], PET/CT STANDARD BODY SCAN (ZSD=28687), 2/11/2021, 9:13 AM.      INDICATIONS: Left upper lobe lung cancer, subsequent treatment strategy.   Patient was compression fracture in L1 is no longer FDG avid. There is orthopedic hardware with postsurgical changes in L2 and T12. The sclerotic focus in the left iliac wing adjacent to the acetabulum    is no longer hypermetabolic.                Impression  CONCLU as well as the right frontal periventricular white matter adjacent to the body of the right lateral ventricle measure up    to 6 mm.   Suspect relatively stable or slightly improving metastatic lesions with small residual areas of cortical enhancement versu (CPT=72158), 3/03/2021, 2:03 PM.  [facility] (CKC=[id number]),   4/12/2021, 1:44 PM.  [facility] (KPW=[id number]), 3/04/2021, 6:42 PM.      INDICATIONS: Oth fracture of first lumbar vertebra, init for development of a fracture of the superior endplate of the L4 vertebral body without significant vertebral body height loss.    ALIGNMENT:   Normal.       LUMBAR DISC LEVELS:   L1-L2: Limited evaluation secondary to significant streak artifact from adjacent unchanged since 4/12/2021. 1.2 cm of posterior osseous retropulsion of the L1 vertebral body, unchanged.       Mild degenerative disc and facet disease throughout the remainder of the lumbar spine without other areas of high-grade canal or foraminal narrowi

## 2021-06-15 NOTE — PROGRESS NOTES
Palliative Care Follow-Up Note     Patient Name: Umm Gibbons   YOB: 1945   Medical Record Number: [id number]   Date of visit: 6/15/2021     Chief Complaint/Reason for Visit:  Patient presents with:  Palliative Care    Past Medical H Herbie knox declines at this time. Follow up appt with Dr. Titi Richards is set for this Thursday to review CT Scan results. Patient seen and examined along with her  Antonella Hence present today.  Herbie knox is alert, oriented, cooperative, answers questions appropriat counseling/discussion     Advance care planning     Therapeutic opioid induced constipation     Pathologic lumbar vertebral fracture     Pathological fracture of lumbar vertebra, initial encounter     Weakness of both lower extremities     Cauda equina com History:  Social History    Socioeconomic History      Marital status:       Spouse name: Not on file      Number of children: Not on file      Years of education: Not on file      Highest education level: Not on file    Tobacco Use      Smoking sta • mupirocin 2 % External Ointment Apply 1 Application topically 3 (three) times daily. 30 g 2   • Senna-Docusate Sodium 8.6-50 MG Oral Tab Take 2 tablets by mouth 3 (three) times daily.  120 tablet 3   • PEG 3350 17 g Oral Powd Pack Take 17 g by mouth brittanie exam room in w/c, but is able to get up and move/walk around. HENT:      Head: Normocephalic and atraumatic.       Right Ear: External ear normal.      Left Ear: External ear normal.      Nose: Nose normal.      Mouth/Throat:      Mouth: Mucous membranes Name: Pacheco Cavazos ()  Healthcare Agent's Phone Number: [phone]          Palliative Care Assessment/Plan:  1. Cancer related pain  - morphINE Sulfate IR 30 MG Oral Tab; Take 1 tablet (30 mg total) by mouth every 4 to 6 hours as needed for Pain.   D stool/diarrhea  -Can also add PO Dulcolax at bedtime/PRN if no BM in 2 days on above regimen      Anxiety/depression/difficulty sleeping  -Continue Mirtazapine to 30mg at bedtime  -Discussed adding Effexor for depression/depressed mood; discussed the [address] a minutes    Reviewing/Obtainin minutes      Medical Exam: 5 minutes    Plan: 10 minutes      Notes: 10 minutes    Counseling/Education: 10 minutes      Referring/Communicatin minutes    Ind Interpretation:   minutes      Care Coordination: 5 minutes

## 2021-06-15 NOTE — PROGRESS NOTES
KIYA   Susan Edmonds is a 68year old female here for follow up of Malignant neoplasm of lower lobe of left lung (hcc)  (primary encounter diagnosis)  Metastasis to vertebral column of unknown origin (hcc)  Brain metastasis (hcc)  Encounter for med Dry skin with peeling. Neurological: Positive for extremity weakness (residual weakness of the RLE.), gait problem (uses walker) and numbness (a little on finger tips and on the feet. ).    Psychiatric/Behavioral: Positive for depression and sleep distur tablet 0   • Omega-3-acid Ethyl Esters (LOVAZA) 1 g Oral Cap Take 1 capsule (1 g total) by mouth daily. 90 capsule 3   • Misc Natural Products (FLEX-A-MIN JOINT FLEX OR) Take by mouth.  Takes two tablets daily     • Vitamin D3 (VITAMIN D3) 2000 UNITS Oral C Neg         multiple   • Diabetes Neg          PHYSICAL EXAM:    /56 (BP Location: Left arm, Patient Position: Sitting, Cuff Size: adult)   Pulse 72   Temp 98.7 °F (37.1 °C) (Oral)   Resp 16   Ht 1.524 m (5')   Wt 56.7 kg (125 lb)   SpO2 99%   BMI 24 good response, oligometastatic disease is treated with radiation therapy after response to therapy.  Also discussed that with the Congregation of immunotherapy, if PD-L1 positive and no other mutations, she may have a better long-term outcome.   Pt has EGFR mutat past 48 hour(s))   COMP METABOLIC PANEL (14)    Collection Time: 06/15/21 10:34 AM   Result Value Ref Range    Glucose 102 (H) 70 - 99 mg/dL    Sodium 136 136 - 145 mmol/L    Potassium 4.4 3.5 - 5.1 mmol/L    Chloride 102 98 - 112 mmol/L    CO2 28.0 21.0 - PET/CT STANDARD BODY SCAN (CPT=78815), 2/11/2021, 9:13 AM.      INDICATIONS: Left upper lobe lung cancer, subsequent treatment strategy.   Patient was diagnosed February 2021 status post osimertinib and CyberKnife to the L1 spinal metastases   Z51.81 Encoun and T12. The sclerotic focus in the left iliac wing adjacent to the acetabulum    is no longer hypermetabolic. Impression  CONCLUSION:   1. F 18 FDG PET-CT study demonstrates a complete metabolic response to therapy   2.  The left lower lobe measure up    to 6 mm.   Suspect relatively stable or slightly improving metastatic lesions with small residual areas of cortical enhancement versus small ring-enhancing lesions at of partially involuted with lesions in the bilateral frontal parietal subcor [facility], CT SPINE LUMBAR (CPT=72131), 3/04/2021, 6:42 PM.      INDICATIONS: Oth fracture of first lumbar vertebra, init for clos fx. Pathologic fractures of the vertebral bodies.       TECHNIQUE:   Multi-planar CT images were obtained wi loss.   ALIGNMENT:   Normal.       LUMBAR DISC LEVELS:   L1-L2: Limited evaluation secondary to significant streak artifact from adjacent hardware. There is significant posterior osseous retropulsion which is unchanged since 4/12/2021.   Postoperative gan degenerative disc and facet disease throughout the remainder of the lumbar spine without other areas of high-grade canal or foraminal narrowing.                 Dictated by (CST): Jalen Gannon MD on 6/10/2021 at 3:29 PM       Finalized by (CST): Jalen Gannon

## 2021-06-18 ENCOUNTER — TELEPHONE (OUTPATIENT)
Dept: HEMATOLOGY/ONCOLOGY | Facility: HOSPITAL | Age: 76
End: 2021-06-18

## 2021-06-18 NOTE — TELEPHONE ENCOUNTER
Patient's  says Marion Connell needs surgery within 2 weeks. He is asking for clearance from  for this.

## 2021-06-18 NOTE — TELEPHONE ENCOUNTER
Returned phone call. Per spouse wanted Dr. Kristina Baez to know patient scheduled for spinal surgery in 2 weeks with Dr. Yu Christianson. Patient has surgical clearance appointment scheduled with Dr. Judah Bloch. Informed per Dr. Kristina Baez should continue taking Osmertinib pre and post-op.

## 2021-06-21 DIAGNOSIS — Z01.818 PREOP EXAMINATION: Primary | ICD-10-CM

## 2021-06-22 ENCOUNTER — OFFICE VISIT (OUTPATIENT)
Dept: INTERNAL MEDICINE CLINIC | Facility: CLINIC | Age: 76
End: 2021-06-22
Payer: COMMERCIAL

## 2021-06-22 ENCOUNTER — LAB ENCOUNTER (OUTPATIENT)
Dept: LAB | Facility: HOSPITAL | Age: 76
End: 2021-06-22
Attending: INTERNAL MEDICINE
Payer: COMMERCIAL

## 2021-06-22 ENCOUNTER — HOSPITAL ENCOUNTER (OUTPATIENT)
Dept: GENERAL RADIOLOGY | Facility: HOSPITAL | Age: 76
Discharge: HOME | End: 2021-06-22
Attending: INTERNAL MEDICINE
Payer: COMMERCIAL

## 2021-06-22 VITALS
WEIGHT: 121 LBS | SYSTOLIC BLOOD PRESSURE: 102 MMHG | DIASTOLIC BLOOD PRESSURE: 70 MMHG | BODY MASS INDEX: 24 KG/M2 | HEART RATE: 72 BPM | OXYGEN SATURATION: 99 %

## 2021-06-22 DIAGNOSIS — I87.2 VENOUS INSUFFICIENCY: ICD-10-CM

## 2021-06-22 DIAGNOSIS — Z51.81 MEDICATION MONITORING ENCOUNTER: ICD-10-CM

## 2021-06-22 DIAGNOSIS — C79.31 BRAIN METASTASIS (HCC): ICD-10-CM

## 2021-06-22 DIAGNOSIS — Z01.818 PREOP EXAMINATION: ICD-10-CM

## 2021-06-22 DIAGNOSIS — S32.018A OTH FRACTURE OF FIRST LUMBAR VERTEBRA, INIT FOR CLOS FX (HCC): ICD-10-CM

## 2021-06-22 DIAGNOSIS — C34.90 PRIMARY MALIGNANT NEOPLASM OF LUNG METASTATIC TO OTHER SITE (HCC): ICD-10-CM

## 2021-06-22 DIAGNOSIS — I10 ESSENTIAL HYPERTENSION: ICD-10-CM

## 2021-06-22 DIAGNOSIS — C34.32 MALIGNANT NEOPLASM OF LOWER LOBE OF LEFT LUNG (HCC): ICD-10-CM

## 2021-06-22 DIAGNOSIS — Z01.818 PREOP EXAMINATION: Primary | ICD-10-CM

## 2021-06-22 PROCEDURE — 3074F SYST BP LT 130 MM HG: CPT | Performed by: INTERNAL MEDICINE

## 2021-06-22 PROCEDURE — 85610 PROTHROMBIN TIME: CPT | Performed by: INTERNAL MEDICINE

## 2021-06-22 PROCEDURE — 3078F DIAST BP <80 MM HG: CPT | Performed by: INTERNAL MEDICINE

## 2021-06-22 PROCEDURE — 81003 URINALYSIS AUTO W/O SCOPE: CPT

## 2021-06-22 PROCEDURE — 93005 ELECTROCARDIOGRAM TRACING: CPT

## 2021-06-22 PROCEDURE — 99214 OFFICE O/P EST MOD 30 MIN: CPT | Performed by: INTERNAL MEDICINE

## 2021-06-22 PROCEDURE — 83735 ASSAY OF MAGNESIUM: CPT

## 2021-06-22 PROCEDURE — 71046 X-RAY EXAM CHEST 2 VIEWS: CPT | Performed by: INTERNAL MEDICINE

## 2021-06-22 PROCEDURE — 85025 COMPLETE CBC W/AUTO DIFF WBC: CPT

## 2021-06-22 PROCEDURE — 36415 COLL VENOUS BLD VENIPUNCTURE: CPT

## 2021-06-22 PROCEDURE — 93010 ELECTROCARDIOGRAM REPORT: CPT | Performed by: INTERNAL MEDICINE

## 2021-06-22 PROCEDURE — 87641 MR-STAPH DNA AMP PROBE: CPT

## 2021-06-22 RX ORDER — OSIMERTINIB 80 1/1
2 TABLET, FILM COATED ORAL DAILY
COMMUNITY
End: 2021-11-03

## 2021-06-22 NOTE — PROGRESS NOTES
Juan F Mas is a 68year old female who presents for a pre-operative physical exam. Patient is to have T8-L4 open lumbar fusion, removal of posterior spinal instrumentation T8-L4 spinal hardware removal to be done by Dr. Jacki Packer on 7/7/21.      H 80 MG Oral Tab 2 tablets 2 (two) times a day. • morphINE Sulfate IR 30 MG Oral Tab Take 1 tablet (30 mg total) by mouth every 4 to 6 hours as needed for Pain. 150 tablet 0   • mirtazapine 30 MG Oral Tab Take 1 tablet (30 mg total) by mouth nightly.  27 CAPSULOTOMY - OD - RIGHT EYE Right 6/6/15    RJPEDRO   • YAG CAPSULOTOMY - OS - LEFT EYE Left 06/16/2015    RJM      Family History   Problem Relation Age of Onset   • Hypertension Mother    • Lipids Mother         hyperlipidemia   • Glaucoma Mother    • Hyper edema  NEURO: Oriented times three,cranial nerves are intact,motor and sensory are grossly intact    Component      Latest Ref Rng & Units 6/22/2021   WBC      4.0 - 11.0 x10(3) uL 4.3   RBC      3.80 - 5.30 x10(6)uL 3.68 (L)   Hemoglobin      12.0 - 16.0 Negative mg/dL Negative   Urobilinogen Urine      <2.0 <2.0   Nitrite Urine      Negative Negative   Leukocyte Esterase Urine      Negative Negative   Microscopic       Microscopic not indicated       Component      Latest Ref Rng & Units 6/22/2021   Nichole                 Impression    CONCLUSION:   1. Borderline cardiomegaly.  Tortuous atherosclerotic aorta.     2. Bibasilar atelectasis/scarring.    3. Dorsal lumbar spondylosis partial corpectomy and posterior instrumentation T12-L2               Dictated Yellow   Clarity Urine      Clear Clear   Spec Gravity      1.001 - 1.030 1.005   Glucose Urine      Negative mg/dL Negative   Bilirubin Urine      Negative Negative   Ketones, UA      Negative mg/dL Negative   Blood Urine      Negative Negative   PH Urine narcotics, benzodiazepines, scopolamine)

## 2021-06-23 RX ORDER — GABAPENTIN 100 MG/1
100 CAPSULE ORAL NIGHTLY
COMMUNITY
End: 2021-07-27

## 2021-06-25 ENCOUNTER — TELEPHONE (OUTPATIENT)
Dept: HEMATOLOGY/ONCOLOGY | Facility: HOSPITAL | Age: 76
End: 2021-06-25

## 2021-06-25 DIAGNOSIS — C80.1 METASTASIS TO VERTEBRAL COLUMN OF UNKNOWN ORIGIN (HCC): ICD-10-CM

## 2021-06-25 DIAGNOSIS — C79.51 METASTASIS TO VERTEBRAL COLUMN OF UNKNOWN ORIGIN (HCC): ICD-10-CM

## 2021-06-25 DIAGNOSIS — G89.3 CANCER RELATED PAIN: ICD-10-CM

## 2021-06-25 RX ORDER — FENTANYL 75 UG/H
1 PATCH TRANSDERMAL
Qty: 10 PATCH | Refills: 0 | Status: SHIPPED | OUTPATIENT
Start: 2021-06-25 | End: 2021-07-27

## 2021-06-25 NOTE — TELEPHONE ENCOUNTER
Returned phone call. Per spouse Dr. Eli Aviles office updated medication and list when patient was in office. Clarified that Osmertinib per Dr. Maura Ray should only be daily not twice daily.  Update medication list.

## 2021-06-28 ENCOUNTER — TELEPHONE (OUTPATIENT)
Dept: FAMILY MEDICINE CLINIC | Facility: CLINIC | Age: 76
End: 2021-06-28

## 2021-06-28 NOTE — TELEPHONE ENCOUNTER
pts  called stating that the surgical date has been changed to 07/15/2021  And also stating that surgeon has not received pts clearance to please send it over

## 2021-06-29 NOTE — TELEPHONE ENCOUNTER
Rk Segura is scheduled to have surgery, they are postponing the surgery to July 15. The fentinol patches  Will not deliver until 7/6. She only has 2 left.

## 2021-06-29 NOTE — TELEPHONE ENCOUNTER
is requesting clearance notes fax over to Mulugeta Aguero. patient surgery was moved to July 13th. See msg below.

## 2021-07-01 ENCOUNTER — APPOINTMENT (OUTPATIENT)
Dept: HEMATOLOGY/ONCOLOGY | Facility: HOSPITAL | Age: 76
End: 2021-07-01
Attending: INTERNAL MEDICINE
Payer: MEDICARE

## 2021-07-08 ENCOUNTER — LAB ENCOUNTER (OUTPATIENT)
Dept: LAB | Facility: HOSPITAL | Age: 76
End: 2021-07-08
Attending: NEUROLOGICAL SURGERY
Payer: COMMERCIAL

## 2021-07-08 DIAGNOSIS — C34.32 MALIGNANT NEOPLASM OF LOWER LOBE OF LEFT LUNG (HCC): ICD-10-CM

## 2021-07-08 DIAGNOSIS — C34.90 PRIMARY MALIGNANT NEOPLASM OF LUNG METASTATIC TO OTHER SITE (HCC): ICD-10-CM

## 2021-07-08 DIAGNOSIS — Z01.818 PREOP TESTING: ICD-10-CM

## 2021-07-08 DIAGNOSIS — Z51.81 MEDICATION MONITORING ENCOUNTER: ICD-10-CM

## 2021-07-08 LAB
ALBUMIN SERPL-MCNC: 3.8 G/DL (ref 3.4–5)
ALBUMIN/GLOB SERPL: 1.2 {RATIO} (ref 1–2)
ALP LIVER SERPL-CCNC: 58 U/L
ALT SERPL-CCNC: 21 U/L
ANION GAP SERPL CALC-SCNC: 5 MMOL/L (ref 0–18)
ANTIBODY SCREEN: NEGATIVE
AST SERPL-CCNC: 19 U/L (ref 15–37)
BASOPHILS # BLD AUTO: 0.02 X10(3) UL (ref 0–0.2)
BASOPHILS NFR BLD AUTO: 0.5 %
BILIRUB SERPL-MCNC: 0.2 MG/DL (ref 0.1–2)
BUN BLD-MCNC: 11 MG/DL (ref 7–18)
BUN/CREAT SERPL: 18 (ref 10–20)
CALCIUM BLD-MCNC: 9.3 MG/DL (ref 8.5–10.1)
CHLORIDE SERPL-SCNC: 102 MMOL/L (ref 98–112)
CO2 SERPL-SCNC: 29 MMOL/L (ref 21–32)
CREAT BLD-MCNC: 0.61 MG/DL
DEPRECATED RDW RBC AUTO: 46.8 FL (ref 35.1–46.3)
EOSINOPHIL # BLD AUTO: 0.21 X10(3) UL (ref 0–0.7)
EOSINOPHIL NFR BLD AUTO: 5.2 %
ERYTHROCYTE [DISTWIDTH] IN BLOOD BY AUTOMATED COUNT: 14.9 % (ref 11–15)
GLOBULIN PLAS-MCNC: 3.1 G/DL (ref 2.8–4.4)
GLUCOSE BLD-MCNC: 104 MG/DL (ref 70–99)
HAV IGM SER QL: 2.4 MG/DL (ref 1.6–2.6)
HCT VFR BLD AUTO: 29.8 %
HGB BLD-MCNC: 9.1 G/DL
IMM GRANULOCYTES # BLD AUTO: 0.01 X10(3) UL (ref 0–1)
IMM GRANULOCYTES NFR BLD: 0.2 %
LYMPHOCYTES # BLD AUTO: 1.38 X10(3) UL (ref 1–4)
LYMPHOCYTES NFR BLD AUTO: 34.4 %
M PROTEIN MFR SERPL ELPH: 6.9 G/DL (ref 6.4–8.2)
MCH RBC QN AUTO: 26.1 PG (ref 26–34)
MCHC RBC AUTO-ENTMCNC: 30.5 G/DL (ref 31–37)
MCV RBC AUTO: 85.4 FL
MONOCYTES # BLD AUTO: 0.58 X10(3) UL (ref 0.1–1)
MONOCYTES NFR BLD AUTO: 14.5 %
NEUTROPHILS # BLD AUTO: 1.81 X10 (3) UL (ref 1.5–7.7)
NEUTROPHILS # BLD AUTO: 1.81 X10(3) UL (ref 1.5–7.7)
NEUTROPHILS NFR BLD AUTO: 45.2 %
OSMOLALITY SERPL CALC.SUM OF ELEC: 282 MOSM/KG (ref 275–295)
PATIENT FASTING Y/N/NP: NO
PLATELET # BLD AUTO: 167 10(3)UL (ref 150–450)
POTASSIUM SERPL-SCNC: 4.6 MMOL/L (ref 3.5–5.1)
RBC # BLD AUTO: 3.49 X10(6)UL
RH BLOOD TYPE: POSITIVE
SODIUM SERPL-SCNC: 136 MMOL/L (ref 136–145)
WBC # BLD AUTO: 4 X10(3) UL (ref 4–11)

## 2021-07-08 PROCEDURE — 86901 BLOOD TYPING SEROLOGIC RH(D): CPT

## 2021-07-08 PROCEDURE — 86900 BLOOD TYPING SEROLOGIC ABO: CPT

## 2021-07-08 PROCEDURE — 86850 RBC ANTIBODY SCREEN: CPT

## 2021-07-08 PROCEDURE — 85025 COMPLETE CBC W/AUTO DIFF WBC: CPT

## 2021-07-08 PROCEDURE — 36415 COLL VENOUS BLD VENIPUNCTURE: CPT

## 2021-07-08 PROCEDURE — 80053 COMPREHEN METABOLIC PANEL: CPT

## 2021-07-08 PROCEDURE — 83735 ASSAY OF MAGNESIUM: CPT

## 2021-07-10 RX ORDER — MULTIVITAMIN WITH IRON
125 TABLET ORAL
COMMUNITY

## 2021-07-10 RX ORDER — MELATONIN
325
COMMUNITY

## 2021-07-10 RX ORDER — MULTIVIT-MIN/IRON FUM/FOLIC AC 7.5 MG-4
1 TABLET ORAL DAILY
COMMUNITY
End: 2021-11-29 | Stop reason: ALTCHOICE

## 2021-07-10 RX ORDER — MELATONIN
1000 DAILY
COMMUNITY

## 2021-07-13 ENCOUNTER — ANESTHESIA (OUTPATIENT)
Dept: SURGERY | Facility: HOSPITAL | Age: 76
DRG: 908 | End: 2021-07-13
Payer: MEDICARE

## 2021-07-13 ENCOUNTER — APPOINTMENT (OUTPATIENT)
Dept: GENERAL RADIOLOGY | Facility: HOSPITAL | Age: 76
DRG: 908 | End: 2021-07-13
Attending: ANESTHESIOLOGY
Payer: MEDICARE

## 2021-07-13 ENCOUNTER — ANESTHESIA EVENT (OUTPATIENT)
Dept: SURGERY | Facility: HOSPITAL | Age: 76
DRG: 908 | End: 2021-07-13
Payer: MEDICARE

## 2021-07-13 ENCOUNTER — APPOINTMENT (OUTPATIENT)
Dept: GENERAL RADIOLOGY | Facility: HOSPITAL | Age: 76
DRG: 908 | End: 2021-07-13
Attending: NEUROLOGICAL SURGERY
Payer: MEDICARE

## 2021-07-13 ENCOUNTER — HOSPITAL ENCOUNTER (INPATIENT)
Facility: HOSPITAL | Age: 76
LOS: 4 days | Discharge: HOME | DRG: 908 | End: 2021-07-17
Attending: NEUROLOGICAL SURGERY | Admitting: NEUROLOGICAL SURGERY
Payer: MEDICARE

## 2021-07-13 DIAGNOSIS — G89.3 CANCER RELATED PAIN: ICD-10-CM

## 2021-07-13 DIAGNOSIS — Z01.818 PREOP TESTING: Primary | ICD-10-CM

## 2021-07-13 DIAGNOSIS — C80.1 METASTASIS TO VERTEBRAL COLUMN OF UNKNOWN ORIGIN (HCC): ICD-10-CM

## 2021-07-13 DIAGNOSIS — C79.51 METASTASIS TO VERTEBRAL COLUMN OF UNKNOWN ORIGIN (HCC): ICD-10-CM

## 2021-07-13 PROBLEM — T84.84XA PAINFUL ORTHOPAEDIC HARDWARE (HCC): Status: ACTIVE | Noted: 2021-07-13

## 2021-07-13 PROBLEM — T84.84XA PAINFUL ORTHOPAEDIC HARDWARE (HCC): Status: ACTIVE | Noted: 2021-01-01

## 2021-07-13 LAB
HCT VFR BLD AUTO: 30.8 %
HGB BLD-MCNC: 10 G/DL
POCT HEMOCUE: 7.9 (ref 12–16)
POCT HEMOCUE: 8 (ref 12–16)
SARS-COV-2 RNA RESP QL NAA+PROBE: NOT DETECTED

## 2021-07-13 PROCEDURE — 30233N1 TRANSFUSION OF NONAUTOLOGOUS RED BLOOD CELLS INTO PERIPHERAL VEIN, PERCUTANEOUS APPROACH: ICD-10-PCS | Performed by: HOSPITALIST

## 2021-07-13 PROCEDURE — 0RG70K1 FUSION OF 2 TO 7 THORACIC VERTEBRAL JOINTS WITH NONAUTOLOGOUS TISSUE SUBSTITUTE, POSTERIOR APPROACH, POSTERIOR COLUMN, OPEN APPROACH: ICD-10-PCS | Performed by: NEUROLOGICAL SURGERY

## 2021-07-13 PROCEDURE — 0SP20JZ REMOVAL OF SYNTHETIC SUBSTITUTE FROM LUMBAR VERTEBRAL DISC, OPEN APPROACH: ICD-10-PCS | Performed by: NEUROLOGICAL SURGERY

## 2021-07-13 PROCEDURE — P9045 ALBUMIN (HUMAN), 5%, 250 ML: HCPCS | Performed by: NURSE ANESTHETIST, CERTIFIED REGISTERED

## 2021-07-13 PROCEDURE — 76000 FLUOROSCOPY <1 HR PHYS/QHP: CPT | Performed by: NEUROLOGICAL SURGERY

## 2021-07-13 PROCEDURE — 99233 SBSQ HOSP IP/OBS HIGH 50: CPT | Performed by: HOSPITALIST

## 2021-07-13 PROCEDURE — 0SG10K1 FUSION OF 2 OR MORE LUMBAR VERTEBRAL JOINTS WITH NONAUTOLOGOUS TISSUE SUBSTITUTE, POSTERIOR APPROACH, POSTERIOR COLUMN, OPEN APPROACH: ICD-10-PCS | Performed by: NEUROLOGICAL SURGERY

## 2021-07-13 PROCEDURE — 36430 TRANSFUSION BLD/BLD COMPNT: CPT | Performed by: ANESTHESIOLOGY

## 2021-07-13 PROCEDURE — 71045 X-RAY EXAM CHEST 1 VIEW: CPT | Performed by: ANESTHESIOLOGY

## 2021-07-13 PROCEDURE — 0RGA0K1 FUSION OF THORACOLUMBAR VERTEBRAL JOINT WITH NONAUTOLOGOUS TISSUE SUBSTITUTE, POSTERIOR APPROACH, POSTERIOR COLUMN, OPEN APPROACH: ICD-10-PCS | Performed by: NEUROLOGICAL SURGERY

## 2021-07-13 DEVICE — IMPLANTABLE DEVICE: Type: IMPLANTABLE DEVICE | Site: BACK | Status: FUNCTIONAL

## 2021-07-13 DEVICE — EXPEDIUM SFX CROSS CONNECTOR SYSTEM CONNECTOR F8 5.5 X 33MM
Type: IMPLANTABLE DEVICE | Site: BACK | Status: FUNCTIONAL
Brand: EXPEDIUM SFX

## 2021-07-13 DEVICE — EXPEDIUM SFX CROSS CONNECTOR SYSTEM CONNECTOR A4 5.5 X 37-41MM
Type: IMPLANTABLE DEVICE | Site: BACK | Status: FUNCTIONAL
Brand: EXPEDIUM SFX

## 2021-07-13 RX ORDER — MORPHINE SULFATE 15 MG/1
30 TABLET ORAL EVERY 4 HOURS PRN
Status: DISCONTINUED | OUTPATIENT
Start: 2021-07-13 | End: 2021-07-17

## 2021-07-13 RX ORDER — CEFAZOLIN SODIUM/WATER 2 G/20 ML
2 SYRINGE (ML) INTRAVENOUS EVERY 8 HOURS
Status: COMPLETED | OUTPATIENT
Start: 2021-07-13 | End: 2021-07-13

## 2021-07-13 RX ORDER — HYDROMORPHONE HYDROCHLORIDE 1 MG/ML
0.2 INJECTION, SOLUTION INTRAMUSCULAR; INTRAVENOUS; SUBCUTANEOUS EVERY 2 HOUR PRN
Status: DISCONTINUED | OUTPATIENT
Start: 2021-07-13 | End: 2021-07-17

## 2021-07-13 RX ORDER — LIDOCAINE HYDROCHLORIDE 10 MG/ML
INJECTION, SOLUTION EPIDURAL; INFILTRATION; INTRACAUDAL; PERINEURAL AS NEEDED
Status: DISCONTINUED | OUTPATIENT
Start: 2021-07-13 | End: 2021-07-13 | Stop reason: SURG

## 2021-07-13 RX ORDER — DOCUSATE SODIUM 100 MG/1
100 CAPSULE, LIQUID FILLED ORAL 2 TIMES DAILY
Status: DISCONTINUED | OUTPATIENT
Start: 2021-07-13 | End: 2021-07-17

## 2021-07-13 RX ORDER — MIRTAZAPINE 15 MG/1
30 TABLET, FILM COATED ORAL NIGHTLY
Status: DISCONTINUED | OUTPATIENT
Start: 2021-07-13 | End: 2021-07-17

## 2021-07-13 RX ORDER — HYDROMORPHONE HYDROCHLORIDE 1 MG/ML
0.4 INJECTION, SOLUTION INTRAMUSCULAR; INTRAVENOUS; SUBCUTANEOUS EVERY 5 MIN PRN
Status: DISCONTINUED | OUTPATIENT
Start: 2021-07-13 | End: 2021-07-13 | Stop reason: HOSPADM

## 2021-07-13 RX ORDER — ONDANSETRON 2 MG/ML
4 INJECTION INTRAMUSCULAR; INTRAVENOUS ONCE AS NEEDED
Status: COMPLETED | OUTPATIENT
Start: 2021-07-13 | End: 2021-07-13

## 2021-07-13 RX ORDER — DEXAMETHASONE SODIUM PHOSPHATE 4 MG/ML
VIAL (ML) INJECTION AS NEEDED
Status: DISCONTINUED | OUTPATIENT
Start: 2021-07-13 | End: 2021-07-13 | Stop reason: SURG

## 2021-07-13 RX ORDER — CYCLOBENZAPRINE HCL 5 MG
2.5 TABLET ORAL EVERY 6 HOURS PRN
Status: DISCONTINUED | OUTPATIENT
Start: 2021-07-13 | End: 2021-07-17

## 2021-07-13 RX ORDER — HYDROCODONE BITARTRATE AND ACETAMINOPHEN 10; 325 MG/1; MG/1
1 TABLET ORAL EVERY 4 HOURS PRN
Status: DISCONTINUED | OUTPATIENT
Start: 2021-07-13 | End: 2021-07-17

## 2021-07-13 RX ORDER — ONDANSETRON 2 MG/ML
INJECTION INTRAMUSCULAR; INTRAVENOUS AS NEEDED
Status: DISCONTINUED | OUTPATIENT
Start: 2021-07-13 | End: 2021-07-13 | Stop reason: SURG

## 2021-07-13 RX ORDER — MELATONIN
325
Status: DISCONTINUED | OUTPATIENT
Start: 2021-07-14 | End: 2021-07-17

## 2021-07-13 RX ORDER — HYDROCODONE BITARTRATE AND ACETAMINOPHEN 10; 325 MG/1; MG/1
2 TABLET ORAL EVERY 4 HOURS PRN
Status: DISCONTINUED | OUTPATIENT
Start: 2021-07-13 | End: 2021-07-17

## 2021-07-13 RX ORDER — FUROSEMIDE 10 MG/ML
10 INJECTION INTRAMUSCULAR; INTRAVENOUS ONCE
Status: COMPLETED | OUTPATIENT
Start: 2021-07-13 | End: 2021-07-13

## 2021-07-13 RX ORDER — ALBUMIN, HUMAN INJ 5% 5 %
SOLUTION INTRAVENOUS CONTINUOUS PRN
Status: DISCONTINUED | OUTPATIENT
Start: 2021-07-13 | End: 2021-07-13 | Stop reason: SURG

## 2021-07-13 RX ORDER — DIPHENHYDRAMINE HCL 25 MG
25 CAPSULE ORAL EVERY 4 HOURS PRN
Status: DISCONTINUED | OUTPATIENT
Start: 2021-07-13 | End: 2021-07-17

## 2021-07-13 RX ORDER — POLYETHYLENE GLYCOL 3350 17 G/17G
17 POWDER, FOR SOLUTION ORAL DAILY PRN
Status: DISCONTINUED | OUTPATIENT
Start: 2021-07-13 | End: 2021-07-17

## 2021-07-13 RX ORDER — SODIUM CHLORIDE, SODIUM LACTATE, POTASSIUM CHLORIDE, CALCIUM CHLORIDE 600; 310; 30; 20 MG/100ML; MG/100ML; MG/100ML; MG/100ML
INJECTION, SOLUTION INTRAVENOUS CONTINUOUS
Status: DISCONTINUED | OUTPATIENT
Start: 2021-07-13 | End: 2021-07-17

## 2021-07-13 RX ORDER — ONDANSETRON 2 MG/ML
4 INJECTION INTRAMUSCULAR; INTRAVENOUS EVERY 4 HOURS PRN
Status: ACTIVE | OUTPATIENT
Start: 2021-07-13 | End: 2021-07-14

## 2021-07-13 RX ORDER — SENNOSIDES 8.6 MG
17.2 TABLET ORAL NIGHTLY
Status: DISCONTINUED | OUTPATIENT
Start: 2021-07-13 | End: 2021-07-17

## 2021-07-13 RX ORDER — HYDROMORPHONE HYDROCHLORIDE 1 MG/ML
0.4 INJECTION, SOLUTION INTRAMUSCULAR; INTRAVENOUS; SUBCUTANEOUS EVERY 2 HOUR PRN
Status: DISCONTINUED | OUTPATIENT
Start: 2021-07-13 | End: 2021-07-17

## 2021-07-13 RX ORDER — CEFAZOLIN SODIUM/WATER 2 G/20 ML
2 SYRINGE (ML) INTRAVENOUS ONCE
Status: COMPLETED | OUTPATIENT
Start: 2021-07-13 | End: 2021-07-13

## 2021-07-13 RX ORDER — ACETAMINOPHEN 325 MG/1
650 TABLET ORAL EVERY 4 HOURS PRN
Status: DISCONTINUED | OUTPATIENT
Start: 2021-07-13 | End: 2021-07-17

## 2021-07-13 RX ORDER — HALOPERIDOL 5 MG/ML
0.25 INJECTION INTRAMUSCULAR ONCE AS NEEDED
Status: DISCONTINUED | OUTPATIENT
Start: 2021-07-13 | End: 2021-07-13 | Stop reason: HOSPADM

## 2021-07-13 RX ORDER — NALOXONE HYDROCHLORIDE 0.4 MG/ML
80 INJECTION, SOLUTION INTRAMUSCULAR; INTRAVENOUS; SUBCUTANEOUS AS NEEDED
Status: DISCONTINUED | OUTPATIENT
Start: 2021-07-13 | End: 2021-07-13 | Stop reason: HOSPADM

## 2021-07-13 RX ORDER — SENNA AND DOCUSATE SODIUM 50; 8.6 MG/1; MG/1
2 TABLET, FILM COATED ORAL 3 TIMES DAILY
Status: DISCONTINUED | OUTPATIENT
Start: 2021-07-13 | End: 2021-07-17

## 2021-07-13 RX ORDER — GLYCOPYRROLATE 0.2 MG/ML
INJECTION, SOLUTION INTRAMUSCULAR; INTRAVENOUS AS NEEDED
Status: DISCONTINUED | OUTPATIENT
Start: 2021-07-13 | End: 2021-07-13 | Stop reason: SURG

## 2021-07-13 RX ORDER — PROCHLORPERAZINE EDISYLATE 5 MG/ML
5 INJECTION INTRAMUSCULAR; INTRAVENOUS ONCE AS NEEDED
Status: DISCONTINUED | OUTPATIENT
Start: 2021-07-13 | End: 2021-07-13 | Stop reason: HOSPADM

## 2021-07-13 RX ORDER — BISACODYL 10 MG
10 SUPPOSITORY, RECTAL RECTAL
Status: DISCONTINUED | OUTPATIENT
Start: 2021-07-13 | End: 2021-07-17

## 2021-07-13 RX ORDER — HYDROCODONE BITARTRATE AND ACETAMINOPHEN 5; 325 MG/1; MG/1
1 TABLET ORAL AS NEEDED
Status: DISCONTINUED | OUTPATIENT
Start: 2021-07-13 | End: 2021-07-13 | Stop reason: HOSPADM

## 2021-07-13 RX ORDER — ROCURONIUM BROMIDE 10 MG/ML
INJECTION, SOLUTION INTRAVENOUS AS NEEDED
Status: DISCONTINUED | OUTPATIENT
Start: 2021-07-13 | End: 2021-07-13 | Stop reason: SURG

## 2021-07-13 RX ORDER — PRAVASTATIN SODIUM 20 MG
20 TABLET ORAL NIGHTLY
Status: DISCONTINUED | OUTPATIENT
Start: 2021-07-13 | End: 2021-07-17

## 2021-07-13 RX ORDER — HYDROMORPHONE HYDROCHLORIDE 1 MG/ML
0.8 INJECTION, SOLUTION INTRAMUSCULAR; INTRAVENOUS; SUBCUTANEOUS EVERY 2 HOUR PRN
Status: DISCONTINUED | OUTPATIENT
Start: 2021-07-13 | End: 2021-07-17

## 2021-07-13 RX ORDER — NEOSTIGMINE METHYLSULFATE 1 MG/ML
INJECTION INTRAVENOUS AS NEEDED
Status: DISCONTINUED | OUTPATIENT
Start: 2021-07-13 | End: 2021-07-13 | Stop reason: SURG

## 2021-07-13 RX ORDER — HYDROMORPHONE HYDROCHLORIDE 1 MG/ML
0.6 INJECTION, SOLUTION INTRAMUSCULAR; INTRAVENOUS; SUBCUTANEOUS EVERY 5 MIN PRN
Status: DISCONTINUED | OUTPATIENT
Start: 2021-07-13 | End: 2021-07-13 | Stop reason: HOSPADM

## 2021-07-13 RX ORDER — ONDANSETRON 4 MG/1
4 TABLET, ORALLY DISINTEGRATING ORAL EVERY 8 HOURS PRN
Status: DISCONTINUED | OUTPATIENT
Start: 2021-07-13 | End: 2021-07-17

## 2021-07-13 RX ORDER — MORPHINE SULFATE 10 MG/ML
6 INJECTION, SOLUTION INTRAMUSCULAR; INTRAVENOUS EVERY 10 MIN PRN
Status: DISCONTINUED | OUTPATIENT
Start: 2021-07-13 | End: 2021-07-13 | Stop reason: HOSPADM

## 2021-07-13 RX ORDER — POLYETHYLENE GLYCOL 3350 17 G/17G
17 POWDER, FOR SOLUTION ORAL DAILY
Status: DISCONTINUED | OUTPATIENT
Start: 2021-07-13 | End: 2021-07-17

## 2021-07-13 RX ORDER — FENTANYL 75 UG/H
1 PATCH TRANSDERMAL
Status: DISCONTINUED | OUTPATIENT
Start: 2021-07-13 | End: 2021-07-17

## 2021-07-13 RX ORDER — BUPIVACAINE HYDROCHLORIDE AND EPINEPHRINE 2.5; 5 MG/ML; UG/ML
INJECTION, SOLUTION INFILTRATION; PERINEURAL AS NEEDED
Status: DISCONTINUED | OUTPATIENT
Start: 2021-07-13 | End: 2021-07-13 | Stop reason: HOSPADM

## 2021-07-13 RX ORDER — PHENYLEPHRINE HCL 10 MG/ML
VIAL (ML) INJECTION AS NEEDED
Status: DISCONTINUED | OUTPATIENT
Start: 2021-07-13 | End: 2021-07-13 | Stop reason: SURG

## 2021-07-13 RX ORDER — HYDROMORPHONE HYDROCHLORIDE 1 MG/ML
INJECTION, SOLUTION INTRAMUSCULAR; INTRAVENOUS; SUBCUTANEOUS
Status: DISPENSED
Start: 2021-07-13 | End: 2021-07-14

## 2021-07-13 RX ORDER — GABAPENTIN 100 MG/1
100 CAPSULE ORAL NIGHTLY
Status: DISCONTINUED | OUTPATIENT
Start: 2021-07-13 | End: 2021-07-17

## 2021-07-13 RX ORDER — HYDROMORPHONE HYDROCHLORIDE 1 MG/ML
0.2 INJECTION, SOLUTION INTRAMUSCULAR; INTRAVENOUS; SUBCUTANEOUS EVERY 5 MIN PRN
Status: DISCONTINUED | OUTPATIENT
Start: 2021-07-13 | End: 2021-07-13 | Stop reason: HOSPADM

## 2021-07-13 RX ORDER — PROCHLORPERAZINE EDISYLATE 5 MG/ML
10 INJECTION INTRAMUSCULAR; INTRAVENOUS EVERY 6 HOURS PRN
Status: ACTIVE | OUTPATIENT
Start: 2021-07-13 | End: 2021-07-15

## 2021-07-13 RX ORDER — IPRATROPIUM BROMIDE AND ALBUTEROL SULFATE 2.5; .5 MG/3ML; MG/3ML
3 SOLUTION RESPIRATORY (INHALATION) EVERY 6 HOURS PRN
Status: DISCONTINUED | OUTPATIENT
Start: 2021-07-13 | End: 2021-07-17

## 2021-07-13 RX ORDER — HYDROCODONE BITARTRATE AND ACETAMINOPHEN 5; 325 MG/1; MG/1
2 TABLET ORAL AS NEEDED
Status: DISCONTINUED | OUTPATIENT
Start: 2021-07-13 | End: 2021-07-13 | Stop reason: HOSPADM

## 2021-07-13 RX ORDER — MORPHINE SULFATE 4 MG/ML
4 INJECTION, SOLUTION INTRAMUSCULAR; INTRAVENOUS EVERY 10 MIN PRN
Status: DISCONTINUED | OUTPATIENT
Start: 2021-07-13 | End: 2021-07-13 | Stop reason: HOSPADM

## 2021-07-13 RX ORDER — EPHEDRINE SULFATE 50 MG/ML
INJECTION, SOLUTION INTRAVENOUS AS NEEDED
Status: DISCONTINUED | OUTPATIENT
Start: 2021-07-13 | End: 2021-07-13 | Stop reason: SURG

## 2021-07-13 RX ORDER — DIPHENHYDRAMINE HYDROCHLORIDE 50 MG/ML
25 INJECTION INTRAMUSCULAR; INTRAVENOUS EVERY 4 HOURS PRN
Status: DISCONTINUED | OUTPATIENT
Start: 2021-07-13 | End: 2021-07-17

## 2021-07-13 RX ORDER — MORPHINE SULFATE 4 MG/ML
2 INJECTION, SOLUTION INTRAMUSCULAR; INTRAVENOUS EVERY 10 MIN PRN
Status: DISCONTINUED | OUTPATIENT
Start: 2021-07-13 | End: 2021-07-13 | Stop reason: HOSPADM

## 2021-07-13 RX ORDER — SODIUM CHLORIDE 9 MG/ML
INJECTION, SOLUTION INTRAVENOUS ONCE
Status: DISCONTINUED | OUTPATIENT
Start: 2021-07-13 | End: 2021-07-17

## 2021-07-13 RX ORDER — SODIUM CHLORIDE, SODIUM LACTATE, POTASSIUM CHLORIDE, CALCIUM CHLORIDE 600; 310; 30; 20 MG/100ML; MG/100ML; MG/100ML; MG/100ML
INJECTION, SOLUTION INTRAVENOUS CONTINUOUS
Status: DISCONTINUED | OUTPATIENT
Start: 2021-07-13 | End: 2021-07-13 | Stop reason: HOSPADM

## 2021-07-13 RX ORDER — ACETAMINOPHEN 500 MG
1000 TABLET ORAL ONCE
Status: COMPLETED | OUTPATIENT
Start: 2021-07-13 | End: 2021-07-13

## 2021-07-13 RX ORDER — SODIUM PHOSPHATE, DIBASIC AND SODIUM PHOSPHATE, MONOBASIC 7; 19 G/133ML; G/133ML
1 ENEMA RECTAL ONCE AS NEEDED
Status: DISCONTINUED | OUTPATIENT
Start: 2021-07-13 | End: 2021-07-17

## 2021-07-13 RX ORDER — SODIUM CHLORIDE 9 MG/ML
INJECTION, SOLUTION INTRAVENOUS CONTINUOUS PRN
Status: DISCONTINUED | OUTPATIENT
Start: 2021-07-13 | End: 2021-07-13 | Stop reason: SURG

## 2021-07-13 RX ADMIN — ONDANSETRON 4 MG: 2 INJECTION INTRAMUSCULAR; INTRAVENOUS at 08:00:00

## 2021-07-13 RX ADMIN — PHENYLEPHRINE HCL 50 MCG: 10 MG/ML VIAL (ML) INJECTION at 10:12:00

## 2021-07-13 RX ADMIN — PHENYLEPHRINE HCL 50 MCG: 10 MG/ML VIAL (ML) INJECTION at 07:51:00

## 2021-07-13 RX ADMIN — SODIUM CHLORIDE, SODIUM LACTATE, POTASSIUM CHLORIDE, CALCIUM CHLORIDE: 600; 310; 30; 20 INJECTION, SOLUTION INTRAVENOUS at 07:31:00

## 2021-07-13 RX ADMIN — NEOSTIGMINE METHYLSULFATE 3 MG: 1 INJECTION INTRAVENOUS at 10:26:00

## 2021-07-13 RX ADMIN — CEFAZOLIN SODIUM/WATER 2 G: 2 G/20 ML SYRINGE (ML) INTRAVENOUS at 07:55:00

## 2021-07-13 RX ADMIN — ROCURONIUM BROMIDE 40 MG: 10 INJECTION, SOLUTION INTRAVENOUS at 07:37:00

## 2021-07-13 RX ADMIN — LIDOCAINE HYDROCHLORIDE 25 MG: 10 INJECTION, SOLUTION EPIDURAL; INFILTRATION; INTRACAUDAL; PERINEURAL at 07:37:00

## 2021-07-13 RX ADMIN — PHENYLEPHRINE HCL 50 MCG: 10 MG/ML VIAL (ML) INJECTION at 09:30:00

## 2021-07-13 RX ADMIN — DEXAMETHASONE SODIUM PHOSPHATE 4 MG: 4 MG/ML VIAL (ML) INJECTION at 08:00:00

## 2021-07-13 RX ADMIN — PHENYLEPHRINE HCL 100 MCG: 10 MG/ML VIAL (ML) INJECTION at 08:28:00

## 2021-07-13 RX ADMIN — ALBUMIN, HUMAN INJ 5%: 5 SOLUTION INTRAVENOUS at 09:39:00

## 2021-07-13 RX ADMIN — PHENYLEPHRINE HCL 100 MCG: 10 MG/ML VIAL (ML) INJECTION at 08:00:00

## 2021-07-13 RX ADMIN — SODIUM CHLORIDE, SODIUM LACTATE, POTASSIUM CHLORIDE, CALCIUM CHLORIDE: 600; 310; 30; 20 INJECTION, SOLUTION INTRAVENOUS at 10:39:00

## 2021-07-13 RX ADMIN — PHENYLEPHRINE HCL 100 MCG: 10 MG/ML VIAL (ML) INJECTION at 09:05:00

## 2021-07-13 RX ADMIN — EPHEDRINE SULFATE 5 MG: 50 INJECTION, SOLUTION INTRAVENOUS at 07:57:00

## 2021-07-13 RX ADMIN — SODIUM CHLORIDE, SODIUM LACTATE, POTASSIUM CHLORIDE, CALCIUM CHLORIDE: 600; 310; 30; 20 INJECTION, SOLUTION INTRAVENOUS at 09:46:00

## 2021-07-13 RX ADMIN — SODIUM CHLORIDE: 9 INJECTION, SOLUTION INTRAVENOUS at 07:47:00

## 2021-07-13 RX ADMIN — GLYCOPYRROLATE 0.5 MG: 0.2 INJECTION, SOLUTION INTRAMUSCULAR; INTRAVENOUS at 10:26:00

## 2021-07-13 RX ADMIN — SODIUM CHLORIDE: 9 INJECTION, SOLUTION INTRAVENOUS at 09:58:00

## 2021-07-13 RX ADMIN — PHENYLEPHRINE HCL 100 MCG: 10 MG/ML VIAL (ML) INJECTION at 09:50:00

## 2021-07-13 RX ADMIN — ROCURONIUM BROMIDE 10 MG: 10 INJECTION, SOLUTION INTRAVENOUS at 10:01:00

## 2021-07-13 RX ADMIN — PHENYLEPHRINE HCL 100 MCG: 10 MG/ML VIAL (ML) INJECTION at 08:14:00

## 2021-07-13 RX ADMIN — PHENYLEPHRINE HCL 50 MCG: 10 MG/ML VIAL (ML) INJECTION at 07:57:00

## 2021-07-13 NOTE — ANESTHESIA POSTPROCEDURE EVALUATION
Patient: Prabhu Prasad    Procedure Summary     Date: 07/13/21 Room / Location: [facility] OR 02 / [facility] OR    Anesthesia Start: 1713 Anesthesia Stop: 8275    Procedures:       T8-L4 open lumbar fusion, removal of posterior spinal instrumentation T

## 2021-07-13 NOTE — OPERATIVE REPORT
NEUROSURGERY OPERATIVE NOTE     Surgery Date:   07/13/21  Hospital:           [facility]  Patient Name:   Amena Harris  Patient MR#:     [id number]  Surgeon:           Dr. Nico Rodriguez  Co-Surgeon:     None  Assistant:         None     Anesthesia:      GETA elbows, wrists, and hips. The posterior lumbar spine was then prepped and draped in the usual sterile manner.     Surgical approach:          A #10 scalpel was used to reopen the patient's previous posterior spinal incision.  This was extended cranially an surfaces to complete the arthrodesis from T10-L4. Hemostasis was then obtained using accommodation of cautery and Gelfoam and thrombin.  A Hemovac drain was then placed in the subfascial space and tunneled percutaneously at the skin.      Closure:

## 2021-07-13 NOTE — PROGRESS NOTES
[facility] - [facility]    Progress Note    Art Mandi Murphy Patient Status:  Inpatient    1945 MRN [id number]   Location [address] UNIT Attending Ilia Torrez MD   Hosp Day # 0 PCP Krish Ayoub MD     HPI/Silvestre 07/08/2021    CREATSERUM 0.61 07/08/2021    BUN 11 07/08/2021     07/08/2021    K 4.6 07/08/2021     07/08/2021    CO2 29.0 07/08/2021     (H) 07/08/2021    CA 9.3 07/08/2021    ALB 3.8 07/08/2021    ALKPHO 58 07/08/2021    BILT 0.2 07/0 ([facility]   Maday Meza MD  7/13/2021

## 2021-07-13 NOTE — INTERVAL H&P NOTE
Pre-op Diagnosis: fracture lumbar    The above referenced H&P was reviewed by Dru Arce MD on 7/13/2021, the patient was examined and no significant changes have occurred in the patient's condition since the H&P was performed.   I discussed with the anurag

## 2021-07-13 NOTE — ANESTHESIA PROCEDURE NOTES
Airway  Date/Time: 7/13/2021 7:41 AM  Urgency: Elective    Airway not difficult    General Information and Staff    Patient location during procedure: OR  Anesthesiologist: Rosa Harris MD  Resident/CRNA: Jeny Johnson CRNA  Performed: CRNA     I

## 2021-07-13 NOTE — ANESTHESIA PREPROCEDURE EVALUATION
Anesthesia PreOp Note    HPI:     Thee Mejía is a 68year old female who presents for preoperative consultation requested by: Lesli Benavides MD    Date of Surgery: 7/13/2021    Procedure(s):  T8-L4 open lumbar fusion, removal of posterior spinal i Date Noted: 01/30/2021      Osteopenia of neck of left femur         Date Noted: 07/18/2018      Glaucoma suspect         Date Noted: 05/21/2015      Pseudophakia of both eyes         Date Noted: 05/21/2015      Vitreous floaters of both eyes Rfl: , 7/12/2021 at Unknown time  CALCIUM OR, Take 600 mg by mouth daily. , Disp: , Rfl: , Past Week at Unknown time  ferrous sulfate 325 (65 FE) MG Oral Tab EC, Take 325 mg by mouth daily with breakfast., Disp: , Rfl: , 7/12/2021 at Unknown time  magnesium Units by mouth daily.   , Disp: , Rfl: , Past Week at Unknown time  ondansetron 4 MG Oral Tablet Dispersible, Take 1 tablet (4 mg total) by mouth every 8 (eight) hours as needed for Nausea., Disp: 60 tablet, Rfl: 0      lactated ringers infusion, , Intraven Exercise: Not Asked        Bike Helmet: Not Asked        Seat Belt: Not Asked        Self-Exams: Not Asked    Social History Narrative      Not on file    Social Determinants of Health  Financial Resource Strain:       Difficulty of Paying Living Expens Pulse:  79   Resp:  20   Temp:  99.2 °F (37.3 °C)   TempSrc:  Oral   SpO2:  99%   Weight: 54.9 kg (121 lb)    Height: 1.524 m (5')         Anesthesia Evaluation     Patient summary reviewed and Nursing notes reviewed    No history of anesthetic complicat

## 2021-07-14 LAB
BLOOD TYPE BARCODE: 7300
BLOOD TYPE BARCODE: 7300
HCT VFR BLD AUTO: 28.2 %
HGB BLD-MCNC: 9.2 G/DL

## 2021-07-14 PROCEDURE — 99233 SBSQ HOSP IP/OBS HIGH 50: CPT | Performed by: HOSPITALIST

## 2021-07-14 RX ORDER — TRAMADOL HYDROCHLORIDE 50 MG/1
50 TABLET ORAL EVERY 6 HOURS PRN
Status: DISCONTINUED | OUTPATIENT
Start: 2021-07-14 | End: 2021-07-17

## 2021-07-14 RX ORDER — SODIUM PHOSPHATE, DIBASIC AND SODIUM PHOSPHATE, MONOBASIC 7; 19 G/133ML; G/133ML
1 ENEMA RECTAL ONCE
Status: COMPLETED | OUTPATIENT
Start: 2021-07-14 | End: 2021-07-14

## 2021-07-14 RX ORDER — TIZANIDINE 4 MG/1
4 TABLET ORAL 3 TIMES DAILY
Status: DISCONTINUED | OUTPATIENT
Start: 2021-07-14 | End: 2021-07-17

## 2021-07-14 NOTE — PLAN OF CARE
Patient c/o of constant 8 out of 10 pain. Dilaudid seemed to only work for brief periods. Patient would like to discuss code status with physician.  Up to bathroom twice with a walker and standby assist. Hemo 9.1 this morning   Problem: PAIN - ADULT  Goal: resources  Description: INTERVENTIONS:  - Identify barriers to discharge w/pt and caregiver  - Include patient/family/discharge partner in discharge planning  - Arrange for needed discharge resources and transportation as appropriate  - Identify discharge Progressing     Problem: Patient Centered Care  Goal: Patient preferences are identified and integrated in the patient's plan of care  Description: Interventions:  - What would you like us to know as we care for you?   - Provide timely, complete, and accur

## 2021-07-14 NOTE — PROGRESS NOTES
[facility]  Progress Note     Benuel Grief Jen Drain  : 1945    Status: Inpatient  Day #: 1    Attending: Becky Louis MD  PCP: Niles Lloyd MD      Assessment and Plan       Painful orthopaedic hardware ([facility] [address]  HAD LUMBAR SPINE L1 METS appropriate    Intake/Output Summary (Last 24 hours) at 7/14/2021 1108  Last data filed at 7/14/2021 0600  Gross per 24 hour   Intake 150 ml   Output 2425 ml   Net -2275 ml         Recent Labs   Lab 07/08/21  1620 07/13/21  1456 07/14/21  0349   WBC 4.0  - Fleet Enema, ondansetron HCl, Prochlorperazine Edisylate, cyclobenzaprine, diphenhydrAMINE **OR** diphenhydrAMINE HCl, acetaminophen **OR** HYDROcodone-acetaminophen **OR** HYDROcodone-acetaminophen, HYDROmorphone HCl **OR** HYDROmorphone HCl **OR** HYDROm

## 2021-07-14 NOTE — PROGRESS NOTES
Neurosurgery POD #1      Ms. Jennifer Chavez is awake and alert and resting comfortably. She complains of 8/10 back pain which radiates to both sides of her groin, greater on the left. No pain in the legs, but occasional intermittent numbness in both legs.   She curr

## 2021-07-14 NOTE — BH PROGRESS NOTE
Double RN skin check done prior to transfer off Unit. Skin check performed by this RN and Lon LENTZ Wounds are as follows: see charting, no wounds, surgical incision intact.  Dressing is clean, dry and intact,     Will remain available for any further

## 2021-07-14 NOTE — PLAN OF CARE
Received patient from PACU after open lumbar fusion, removal of posterior spinal instrumentation. Hemovac drain, 60 ml output during shift, bloody. Patient alert and oriented. Complaining of pain. Dilaudid x3 given, tylenol given as well.   at bedsid PLANNING  Goal: Discharge to home or other facility with appropriate resources  Description: INTERVENTIONS:  - Identify barriers to discharge w/pt and caregiver  - Include patient/family/discharge partner in discharge planning  - Arrange for needed dischar interventions  Outcome: Progressing

## 2021-07-14 NOTE — PLAN OF CARE
Assumed care of patient this afternoon. Tramadol and Dilaudid given for pain with improvement. Hemovac in place. Back brace on when out of bed. Able to verbalize needs, call light in reach, continue to monitor.

## 2021-07-14 NOTE — PLAN OF CARE
VSS. Pt pain responded to oral morphine, Pt is better on a schedule and giving something every 3 hours. Pt received a bolus of 500 ml for low BP this am. Pt had receiving IV dilaudid . 8, pt should get lower doses as to not drop BP.  Pt is ok for transfer to activity based on assessment  - Modify environment to reduce risk of injury  - Provide assistive devices as appropriate  - Consider OT/PT consult to assist with strengthening/mobility  - Encourage toileting schedule  Outcome: Progressing     Problem: [facility] See additional Care Plan goals for specific interventions  Outcome: Progressing  Goal: Patient/Family Short Term Goal  Description: Patient's Short Term Goal: Pain control    Interventions:   - Pain meds PRN  - Oral meds with IV for breakthrough  - See add

## 2021-07-14 NOTE — OCCUPATIONAL THERAPY NOTE
OCCUPATIONAL THERAPY EVALUATION - INPATIENT      Room Number: 206/206-A  Evaluation Date: 7/14/2021  Type of Evaluation: Initial  Presenting Problem:  (s/p lumbar surgery)    Physician Order: IP Consult to Occupational Therapy  Reason for Therapy: ADL/IADL benefit from home with 24hr spv/support from spouse. DISCHARGE RECOMMENDATIONS  OT Discharge Recommendations: 24 hour care/supervision;Home  OT Device Recommendations: Reacher;Long-handled sponge    PLAN  OT Treatment Plan: Balance activities; Energy con Condo  Home Layout: One level  Lives With: Spouse  Toilet and Equipment: Standard height toilet;3-in-1 commode  Shower/Tub and Equipment: Tub-shower combo;Grab bar  Other Equipment:  (owns a RW)  Drives: No  Patient Regularly Uses: None  Stairs in Home: 0 Sitting: good  Dynamic Sitting: good  Static Standing: fair+  Dynamic Standing: fair    FUNCTIONAL ADL ASSESSMENT  Grooming: SBA standing   Feeding: indep   Bathing: min a   Toileting: min a   Upper Body Dressing: min a   Lower Body Dressing: min a     Edu

## 2021-07-14 NOTE — PHYSICAL THERAPY NOTE
PHYSICAL THERAPY EVALUATION - INPATIENT     Room Number: 206/206-A  Evaluation Date: 7/14/2021  Type of Evaluation: Initial   Physician Order: PT Eval and Treat    Presenting Problem: T8-L4 open lumbar fusion w/ removal of posterior spinal instrumentation training;Gait training;Family education;Patient education; Energy conservation; Endurance; Body mechanics; Coordination;Balance training;Stair training;Strengthening;Stoop training  Rehab Potential : Good  Frequency (Obs): Daily       PHYSICAL THERAPY MEDICAL/ Lives With: Spouse     Patient Owned Equipment: Rolling walker (commode)       Prior Level of [place]: Pt was independent. SUBJECTIVE  Pt is a retired ICU nurse.     PHYSICAL THERAPY EXAMINATION     OBJECTIVE  Precautions: Spine;Lumbar brace  Fa GOALS    Goals to be met by: 7/28/21  Patient Goal Patient's self-stated goal is: to go home   Goal #1 Patient is able to demonstrate supine - sit EOB @ level: modified independent     Goal #1   Current Status    Goal #2 Patient is able to demonstrate brown

## 2021-07-15 ENCOUNTER — APPOINTMENT (OUTPATIENT)
Dept: HEMATOLOGY/ONCOLOGY | Facility: HOSPITAL | Age: 76
End: 2021-07-15
Attending: INTERNAL MEDICINE
Payer: COMMERCIAL

## 2021-07-15 LAB
ANION GAP SERPL CALC-SCNC: 5 MMOL/L (ref 0–18)
BASOPHILS # BLD AUTO: 0.03 X10(3) UL (ref 0–0.2)
BASOPHILS NFR BLD AUTO: 0.4 %
BUN BLD-MCNC: 11 MG/DL (ref 7–18)
BUN/CREAT SERPL: 22 (ref 10–20)
CALCIUM BLD-MCNC: 9 MG/DL (ref 8.5–10.1)
CHLORIDE SERPL-SCNC: 101 MMOL/L (ref 98–112)
CO2 SERPL-SCNC: 28 MMOL/L (ref 21–32)
CREAT BLD-MCNC: 0.5 MG/DL
DEPRECATED RDW RBC AUTO: 45.7 FL (ref 35.1–46.3)
EOSINOPHIL # BLD AUTO: 0.07 X10(3) UL (ref 0–0.7)
EOSINOPHIL NFR BLD AUTO: 1 %
ERYTHROCYTE [DISTWIDTH] IN BLOOD BY AUTOMATED COUNT: 14.9 % (ref 11–15)
GLUCOSE BLD-MCNC: 110 MG/DL (ref 70–99)
HCT VFR BLD AUTO: 28.1 %
HGB BLD-MCNC: 9.1 G/DL
IMM GRANULOCYTES # BLD AUTO: 0.03 X10(3) UL (ref 0–1)
IMM GRANULOCYTES NFR BLD: 0.4 %
LYMPHOCYTES # BLD AUTO: 1.31 X10(3) UL (ref 1–4)
LYMPHOCYTES NFR BLD AUTO: 17.9 %
MCH RBC QN AUTO: 27.2 PG (ref 26–34)
MCHC RBC AUTO-ENTMCNC: 32.4 G/DL (ref 31–37)
MCV RBC AUTO: 84.1 FL
MONOCYTES # BLD AUTO: 0.96 X10(3) UL (ref 0.1–1)
MONOCYTES NFR BLD AUTO: 13.1 %
NEUTROPHILS # BLD AUTO: 4.92 X10 (3) UL (ref 1.5–7.7)
NEUTROPHILS # BLD AUTO: 4.92 X10(3) UL (ref 1.5–7.7)
NEUTROPHILS NFR BLD AUTO: 67.2 %
OSMOLALITY SERPL CALC.SUM OF ELEC: 278 MOSM/KG (ref 275–295)
PLATELET # BLD AUTO: 110 10(3)UL (ref 150–450)
POTASSIUM SERPL-SCNC: 3.9 MMOL/L (ref 3.5–5.1)
RBC # BLD AUTO: 3.34 X10(6)UL
SODIUM SERPL-SCNC: 134 MMOL/L (ref 136–145)
WBC # BLD AUTO: 7.3 X10(3) UL (ref 4–11)

## 2021-07-15 PROCEDURE — 99233 SBSQ HOSP IP/OBS HIGH 50: CPT | Performed by: HOSPITALIST

## 2021-07-15 NOTE — OCCUPATIONAL THERAPY NOTE
OCCUPATIONAL THERAPY TREATMENT NOTE - INPATIENT    Room Number: 701/868-Y         Presenting Problem:  (s/p lumbar surgery)     Problem List  Principal Problem:    Painful orthopaedic hardware [facility])  Active Problems:    Hyperlipidemia    Hyperglycemia    P hard time wiping. \" (re: BMs)     OBJECTIVE  Precautions: Spine;Lumbar brace    WEIGHT BEARING RESTRICTION  Weight Bearing Restriction: None                PAIN ASSESSMENT  Rating: Unable to rate (Did not rate on pain scale )  Location:  (Spine )  Manageme  on:   Frequency: 1-2 f/u    Nathaly Morris, OTR/L

## 2021-07-15 NOTE — PHYSICAL THERAPY NOTE
PHYSICAL THERAPY TREATMENT NOTE - INPATIENT     Room Number: 714/080-W       Presenting Problem: T8-L4 open lumbar fusion w/ removal of posterior spinal instrumentation    Problem List  Principal Problem:    Painful orthopaedic hardware [facility])  Active Probl difficulty does the patient currently have. ..  -   Turning over in bed (including adjusting bedclothes, sheets and blankets)?: A Little   -   Sitting down on and standing up from a chair with arms (e.g., wheelchair, bedside commode, etc.): A Little   -   M

## 2021-07-15 NOTE — PLAN OF CARE
Pt is ao x 4, pt is sleeping on room air, pt is voiding, pt is up with one and walker, will continue to monitor pt for safety    Problem: PAIN - ADULT  Goal: Verbalizes/displays adequate comfort level or patient's stated pain goal  Description: INTERVENTIO Include patient/family/discharge partner in discharge planning  - Arrange for needed discharge resources and transportation as appropriate  - Identify discharge learning needs (meds, wound care, etc)  - Arrange for interpreters to assist at discharge as ne

## 2021-07-15 NOTE — PROGRESS NOTES
S: No events    O:    07/15/21  0406   BP: 118/60   Pulse: 80   Resp: 16   Temp: 98.6 °F (37 °C)     KEEN 5/5  Wound - CDI  HV 30cc    A/P  The patient is doing very well and is progressing rapidly. Her wound looks good and I have removed her drain today.

## 2021-07-15 NOTE — PLAN OF CARE
Problem: PAIN - ADULT  Goal: Verbalizes/displays adequate comfort level or patient's stated pain goal  Description: INTERVENTIONS:  - Encourage pt to monitor pain and request assistance  - Assess pain using appropriate pain scale  - Administer analgesics appropriate  - Identify discharge learning needs (meds, wound care, etc)  - Arrange for interpreters to assist at discharge as needed  - Consider post-discharge preferences of patient/family/discharge partner  - Complete POLST form as appropriate  - Assess Interventions:  - What would you like us to know as we care for you?  Patient's primary caretaker is her  who is at bedside.   - Provide timely, complete, and accurate information to patient/family  - Incorporate patient and family knowledge, values,

## 2021-07-16 PROCEDURE — 99233 SBSQ HOSP IP/OBS HIGH 50: CPT | Performed by: HOSPITALIST

## 2021-07-16 NOTE — PLAN OF CARE
Patient has been laying in bed and sitting up in chair during meals; with call light within reach. Patients pain is being controlled with Morphine PO, tramadol PO, Tylenol PO, and Flexiril PO. Patient also has new Fentanyl patch on left upper arm.  Patient affect risk of falls.   - [place] fall precautions as indicated by assessment.  - Educate pt/family on patient safety including physical limitations  - Instruct pt to call for assistance with activity based on assessment  - Modify environment to reduce ri patient  Outcome: Progressing     Problem: Patient/Family Goals  Goal: Patient/Family Long Term Goal  Description: Patient's Long Term Goal: For patient to display an increased in strength and function.      Interventions:  - Maintain area in the prescribed

## 2021-07-16 NOTE — PROGRESS NOTES
Ms. Jocelyne Patel is resting comfortably. She said that her pain has been better over the last day compared to the first day on from surgery.   She has transition from IV pain medication to oral pain medication and does not feel that gives her the same relief but fe

## 2021-07-16 NOTE — PROGRESS NOTES
[facility]  Progress Note     Nga Wei Pastora  : 1945    Status: Inpatient  Day #: 3    Attending: Lucía More MD  PCP: Darrin Khan MD      Assessment and Plan       Painful orthopaedic hardware ([facility] [address]  HAD LUMBAR SPINE L1 METS and appropriate    Intake/Output Summary (Last 24 hours) at 7/16/2021 1333  Last data filed at 7/16/2021 0020  Gross per 24 hour   Intake 120 ml   Output 550 ml   Net -430 ml         Recent Labs   Lab 07/13/21  1456 07/14/21  0349 07/15/21  0534   WBC  --

## 2021-07-16 NOTE — PHYSICAL THERAPY NOTE
PHYSICAL THERAPY TREATMENT NOTE - INPATIENT     Room Number: 689/955-N       Presenting Problem: T8-L4 open lumbar fusion w/ removal of posterior spinal instrumentation    Problem List  Principal Problem:    Painful orthopaedic hardware [facility])  Active Probl WALK       AM-PAC '6-Clicks' INPATIENT SHORT FORM - BASIC MOBILITY  How much difficulty does the patient currently have. ..  -   Turning over in bed (including adjusting bedclothes, sheets and blankets)?: A Little   -   Sitting down on and standing up from Goal #5   Current Status    Goal #6    Goal #6  Current Status

## 2021-07-16 NOTE — PLAN OF CARE
Patient is alert and oriented.  at bedside. RA. SCDs on for DVT prophylaxis. Tolerating general diet. Voiding freely. Up with standby assistance with walker. Dressing to back clean, dry, intact.  PRN Morphine, tramadol, and tylenol given for pain man schedule  Outcome: Progressing     Problem: DISCHARGE PLANNING  Goal: Discharge to home or other facility with appropriate resources  Description: INTERVENTIONS:  - Identify barriers to discharge w/pt and caregiver  - Include patient/family/discharge partn Goal  Description: Patient's Short Term Goal: adequate pain control.      Interventions:   - medicate patient accordingly.   - See additional Care Plan goals for specific interventions  Outcome: Progressing     Problem: Patient Centered Care  Goal: Patient

## 2021-07-17 VITALS
SYSTOLIC BLOOD PRESSURE: 116 MMHG | DIASTOLIC BLOOD PRESSURE: 57 MMHG | HEART RATE: 75 BPM | BODY MASS INDEX: 23.75 KG/M2 | OXYGEN SATURATION: 96 % | HEIGHT: 60 IN | RESPIRATION RATE: 18 BRPM | TEMPERATURE: 98 F | WEIGHT: 121 LBS

## 2021-07-17 PROCEDURE — 99232 SBSQ HOSP IP/OBS MODERATE 35: CPT | Performed by: HOSPITALIST

## 2021-07-17 RX ORDER — CEPHALEXIN 500 MG/1
500 CAPSULE ORAL 3 TIMES DAILY
Qty: 21 CAPSULE | Refills: 0 | Status: SHIPPED | OUTPATIENT
Start: 2021-07-17 | End: 2021-07-24

## 2021-07-17 RX ORDER — PSEUDOEPHEDRINE HCL 30 MG
100 TABLET ORAL 2 TIMES DAILY
Qty: 60 CAPSULE | Refills: 0 | Status: SHIPPED | OUTPATIENT
Start: 2021-07-17 | End: 2021-09-01

## 2021-07-17 RX ORDER — TRAMADOL HYDROCHLORIDE 50 MG/1
50 TABLET ORAL EVERY 6 HOURS PRN
Qty: 20 TABLET | Refills: 0 | Status: SHIPPED | OUTPATIENT
Start: 2021-07-17 | End: 2021-07-27

## 2021-07-17 RX ORDER — CYCLOBENZAPRINE HCL 5 MG
2.5 TABLET ORAL EVERY 6 HOURS PRN
Qty: 30 TABLET | Refills: 0 | Status: SHIPPED | OUTPATIENT
Start: 2021-07-17 | End: 2021-07-27

## 2021-07-17 RX ORDER — MORPHINE SULFATE 30 MG/1
30 TABLET ORAL
Qty: 30 TABLET | Refills: 0 | Status: SHIPPED | OUTPATIENT
Start: 2021-07-17 | End: 2021-07-27

## 2021-07-17 NOTE — DISCHARGE SUMMARY
[facility]  Discharge Summary     Daniel Salcedo  : 1945    Status: Inpatient  Day #: 4    Attending: Tori Matute MD  PCP: Salena Junior MD     Date of Admission:  2021  Date of Discharge:  2021     Hospital Discharge Status    [id number] 7/13/21 T8-L4 open lumbar fusion, removal of posterior spinal instrumentation T8-L4 Margret Rodriges MD [facility] MAIN OR Comp         Physical Exam   Blood pressure 116/57, pulse 75, temperature 98.3 °F (36.8 °C), temperature source Oral, resp.  r FLEX OR      Take by mouth.  Takes two tablets daily   Refills: 0     furosemide 20 MG Tabs  Commonly known as: LASIX      1 tab Prn for pedal  edema   Quantity: 90 tablet  Refills: 0     gabapentin 100 MG Caps  Commonly known as: NEURONTIN      Take 100 mg prescription for each of these medications  · cephALEXin 500 MG Caps  · cyclobenzaprine 5 MG Tabs  · docusate sodium 100 MG Caps  · morphINE Sulfate IR 30 MG Tabs  · traMADol HCl 50 MG Tabs       Follow-up Information     Leti Vences MD In 10 days.     Sp

## 2021-07-17 NOTE — PLAN OF CARE
Problem: PAIN - ADULT  Goal: Verbalizes/displays adequate comfort level or patient's stated pain goal  Description: INTERVENTIONS:  - Encourage pt to monitor pain and request assistance  - Assess pain using appropriate pain scale  - Administer analgesics resources and transportation as appropriate  - Identify discharge learning needs (meds, wound care, etc)  - Arrange for interpreters to assist at discharge as needed  - Consider post-discharge preferences of patient/family/discharge partner  - Complete RAHUL

## 2021-07-17 NOTE — PHYSICAL THERAPY NOTE
PHYSICAL THERAPY TREATMENT NOTE - INPATIENT     Room Number: 856/097-I       Presenting Problem: T8-L4 open lumbar fusion w/ removal of posterior spinal instrumentation    Problem List  Principal Problem:    Painful orthopaedic hardware [facility])  Active Probl Good           Static Standing: Fair  Dynamic Standing: Fair -    ACTIVITY TOLERANCE                         O2 WALK       AM-PAC '6-Clicks' INPATIENT SHORT FORM - BASIC MOBILITY  How much difficulty does the patient currently have. ..  -   Turning over in instructions provided to patient in preparation for discharge.    Goal #4   Current Status In progress   Goal #5    Goal #5   Current Status    Goal #6    Goal #6  Current Status

## 2021-07-17 NOTE — PROGRESS NOTES
NEUROSURGERY     S:  Patient is resting comfortably chair with  at her side. She complains of pain in the back at the incision site. She states medication helps to take the edge off and make the pain bearable stating mornings are her best time.   C

## 2021-07-17 NOTE — PLAN OF CARE
VSS, no acute events overnight. Pt is aox4, ambulating independently, up in hallway once overnight. Voiding freely in bathroom. SCD's and raisa hose for DVT prophylaxis. Dressing telfa and tegaderm.  Pain regimen discussed at length with patient; receiving Mo risk of falls.   - [place] fall precautions as indicated by assessment.  - Educate pt/family on patient safety including physical limitations  - Instruct pt to call for assistance with activity based on assessment  - Modify environment to reduce risk of i Progressing     Problem: Patient/Family Goals  Goal: Patient/Family Long Term Goal  Description: Patient's Long Term Goal: For patient to display an increase in strength and function. Interventions:  - Maintain area in the prescribed position.    - medi

## 2021-07-19 ENCOUNTER — PATIENT OUTREACH (OUTPATIENT)
Dept: CASE MANAGEMENT | Age: 76
End: 2021-07-19

## 2021-07-19 ENCOUNTER — TELEPHONE (OUTPATIENT)
Dept: HEMATOLOGY/ONCOLOGY | Facility: HOSPITAL | Age: 76
End: 2021-07-19

## 2021-07-19 NOTE — TELEPHONE ENCOUNTER
Returned phone call.  Patient scheduled for follow up appointments: 7/27/21 labs 9:30am Dr. Kong Baugh 10:30 am and Irvin Alexander 11am.

## 2021-07-19 NOTE — PROGRESS NOTES
LM for pt  to call [facility] for TCM since discharge. [facility] phone number was provided for pt to call back.

## 2021-07-19 NOTE — TELEPHONE ENCOUNTER
Lily Sanchez is calling to discuss getting a [facility] F/U to see Dr. Riley Escobedo and Catherine Dennis he would like to discuss her medication and the delivery of it from [facility] and he also need Sully to  call him as well for the pain management.

## 2021-07-20 ENCOUNTER — TELEPHONE (OUTPATIENT)
Dept: HEMATOLOGY/ONCOLOGY | Facility: HOSPITAL | Age: 76
End: 2021-07-20

## 2021-07-20 NOTE — TELEPHONE ENCOUNTER
I spoke with Archana Hilton and Lily Sanchez via telephone today. Archana Hilton was discharged from hospital on 7/17/2021 s/p surgery by Dr. Page Bueno. Archana Hilton reports Flexeril 2.5mg is effective in controlling muscle spasms. She takes 3-4 doses/day. She is requesting refill.  I de

## 2021-07-20 NOTE — TELEPHONE ENCOUNTER
Celeste Chenug is calling because he have questions regarding the medication his wife was discharged on and would like to speak to you Bonnell Boeck.

## 2021-07-27 ENCOUNTER — OFFICE VISIT (OUTPATIENT)
Dept: HEMATOLOGY/ONCOLOGY | Facility: HOSPITAL | Age: 76
End: 2021-07-27
Attending: NURSE PRACTITIONER
Payer: COMMERCIAL

## 2021-07-27 ENCOUNTER — HOSPITAL ENCOUNTER (OUTPATIENT)
Dept: CT IMAGING | Facility: HOSPITAL | Age: 76
Discharge: HOME | End: 2021-07-27
Attending: INTERNAL MEDICINE
Payer: COMMERCIAL

## 2021-07-27 VITALS
OXYGEN SATURATION: 100 % | SYSTOLIC BLOOD PRESSURE: 123 MMHG | DIASTOLIC BLOOD PRESSURE: 47 MMHG | TEMPERATURE: 97 F | RESPIRATION RATE: 18 BRPM | HEIGHT: 60 IN | HEART RATE: 76 BPM | BODY MASS INDEX: 24.94 KG/M2 | WEIGHT: 127 LBS

## 2021-07-27 VITALS
SYSTOLIC BLOOD PRESSURE: 123 MMHG | WEIGHT: 127 LBS | RESPIRATION RATE: 18 BRPM | DIASTOLIC BLOOD PRESSURE: 47 MMHG | OXYGEN SATURATION: 100 % | BODY MASS INDEX: 24.94 KG/M2 | HEIGHT: 60 IN | TEMPERATURE: 97 F | HEART RATE: 76 BPM

## 2021-07-27 DIAGNOSIS — R51.9 NEW ONSET OF HEADACHES: ICD-10-CM

## 2021-07-27 DIAGNOSIS — C34.90 PRIMARY MALIGNANT NEOPLASM OF LUNG METASTATIC TO OTHER SITE, UNSPECIFIED LATERALITY (HCC): Primary | ICD-10-CM

## 2021-07-27 DIAGNOSIS — C79.31 BRAIN METASTASIS (HCC): ICD-10-CM

## 2021-07-27 DIAGNOSIS — Z71.89 GOALS OF CARE, COUNSELING/DISCUSSION: ICD-10-CM

## 2021-07-27 DIAGNOSIS — Z51.81 ENCOUNTER FOR MEDICATION MONITORING: ICD-10-CM

## 2021-07-27 DIAGNOSIS — C34.90 PRIMARY MALIGNANT NEOPLASM OF LUNG METASTATIC TO OTHER SITE (HCC): ICD-10-CM

## 2021-07-27 DIAGNOSIS — C34.32 MALIGNANT NEOPLASM OF LOWER LOBE OF LEFT LUNG (HCC): ICD-10-CM

## 2021-07-27 DIAGNOSIS — F32.A DEPRESSION, UNSPECIFIED DEPRESSION TYPE: ICD-10-CM

## 2021-07-27 DIAGNOSIS — C34.90 PRIMARY MALIGNANT NEOPLASM OF LUNG METASTATIC TO OTHER SITE, UNSPECIFIED LATERALITY (HCC): ICD-10-CM

## 2021-07-27 DIAGNOSIS — F11.90 CHRONIC, CONTINUOUS USE OF OPIOIDS: ICD-10-CM

## 2021-07-27 DIAGNOSIS — F41.9 ANXIETY: ICD-10-CM

## 2021-07-27 DIAGNOSIS — D64.9 ANEMIA, UNSPECIFIED TYPE: ICD-10-CM

## 2021-07-27 DIAGNOSIS — C80.1 METASTASIS TO VERTEBRAL COLUMN OF UNKNOWN ORIGIN (HCC): ICD-10-CM

## 2021-07-27 DIAGNOSIS — C34.32 MALIGNANT NEOPLASM OF LOWER LOBE OF LEFT LUNG (HCC): Primary | ICD-10-CM

## 2021-07-27 DIAGNOSIS — C79.51 METASTASIS TO VERTEBRAL COLUMN OF UNKNOWN ORIGIN (HCC): ICD-10-CM

## 2021-07-27 DIAGNOSIS — T40.2X5A THERAPEUTIC OPIOID INDUCED CONSTIPATION: ICD-10-CM

## 2021-07-27 DIAGNOSIS — T40.2X5A THERAPEUTIC OPIOID-INDUCED CONSTIPATION (OIC): ICD-10-CM

## 2021-07-27 DIAGNOSIS — Z51.81 MEDICATION MONITORING ENCOUNTER: ICD-10-CM

## 2021-07-27 DIAGNOSIS — G47.9 DIFFICULTY SLEEPING: ICD-10-CM

## 2021-07-27 DIAGNOSIS — Z71.89 ADVANCE CARE PLANNING: ICD-10-CM

## 2021-07-27 DIAGNOSIS — K59.03 THERAPEUTIC OPIOID INDUCED CONSTIPATION: ICD-10-CM

## 2021-07-27 DIAGNOSIS — M62.838 MUSCLE SPASM: ICD-10-CM

## 2021-07-27 DIAGNOSIS — Z51.5 PALLIATIVE CARE ENCOUNTER: ICD-10-CM

## 2021-07-27 DIAGNOSIS — K59.03 THERAPEUTIC OPIOID-INDUCED CONSTIPATION (OIC): ICD-10-CM

## 2021-07-27 DIAGNOSIS — G89.3 CANCER RELATED PAIN: Primary | ICD-10-CM

## 2021-07-27 LAB
ALBUMIN SERPL-MCNC: 3.7 G/DL (ref 3.4–5)
ALBUMIN/GLOB SERPL: 1.1 {RATIO} (ref 1–2)
ALP LIVER SERPL-CCNC: 115 U/L
ALT SERPL-CCNC: 40 U/L
ANION GAP SERPL CALC-SCNC: 4 MMOL/L (ref 0–18)
AST SERPL-CCNC: 16 U/L (ref 15–37)
BASOPHILS # BLD AUTO: 0.02 X10(3) UL (ref 0–0.2)
BASOPHILS NFR BLD AUTO: 0.3 %
BILIRUB SERPL-MCNC: 0.3 MG/DL (ref 0.1–2)
BUN BLD-MCNC: 9 MG/DL (ref 7–18)
BUN/CREAT SERPL: 13.8 (ref 10–20)
CALCIUM BLD-MCNC: 9.5 MG/DL (ref 8.5–10.1)
CHLORIDE SERPL-SCNC: 102 MMOL/L (ref 98–112)
CO2 SERPL-SCNC: 29 MMOL/L (ref 21–32)
CREAT BLD-MCNC: 0.65 MG/DL
DEPRECATED HBV CORE AB SER IA-ACNC: 589.7 NG/ML
DEPRECATED RDW RBC AUTO: 49 FL (ref 35.1–46.3)
EOSINOPHIL # BLD AUTO: 0.21 X10(3) UL (ref 0–0.7)
EOSINOPHIL NFR BLD AUTO: 3.5 %
ERYTHROCYTE [DISTWIDTH] IN BLOOD BY AUTOMATED COUNT: 15.5 % (ref 11–15)
GLOBULIN PLAS-MCNC: 3.5 G/DL (ref 2.8–4.4)
GLUCOSE BLD-MCNC: 92 MG/DL (ref 70–99)
HAV IGM SER QL: 2.4 MG/DL (ref 1.6–2.6)
HCT VFR BLD AUTO: 28.6 %
HGB BLD-MCNC: 8.8 G/DL
IMM GRANULOCYTES # BLD AUTO: 0.04 X10(3) UL (ref 0–1)
IMM GRANULOCYTES NFR BLD: 0.7 %
IRON SATURATION: 8 %
IRON SERPL-MCNC: 28 UG/DL
LYMPHOCYTES # BLD AUTO: 0.94 X10(3) UL (ref 1–4)
LYMPHOCYTES NFR BLD AUTO: 15.7 %
M PROTEIN MFR SERPL ELPH: 7.2 G/DL (ref 6.4–8.2)
MCH RBC QN AUTO: 26.3 PG (ref 26–34)
MCHC RBC AUTO-ENTMCNC: 30.8 G/DL (ref 31–37)
MCV RBC AUTO: 85.4 FL
MONOCYTES # BLD AUTO: 0.66 X10(3) UL (ref 0.1–1)
MONOCYTES NFR BLD AUTO: 11.1 %
NEUTROPHILS # BLD AUTO: 4.1 X10 (3) UL (ref 1.5–7.7)
NEUTROPHILS # BLD AUTO: 4.1 X10(3) UL (ref 1.5–7.7)
NEUTROPHILS NFR BLD AUTO: 68.7 %
OSMOLALITY SERPL CALC.SUM OF ELEC: 278 MOSM/KG (ref 275–295)
PATIENT FASTING Y/N/NP: NO
PLATELET # BLD AUTO: 181 10(3)UL (ref 150–450)
POTASSIUM SERPL-SCNC: 4.8 MMOL/L (ref 3.5–5.1)
RBC # BLD AUTO: 3.35 X10(6)UL
SODIUM SERPL-SCNC: 135 MMOL/L (ref 136–145)
TOTAL IRON BINDING CAPACITY: 331 UG/DL (ref 240–450)
TRANSFERRIN SERPL-MCNC: 222 MG/DL (ref 200–360)
WBC # BLD AUTO: 6 X10(3) UL (ref 4–11)

## 2021-07-27 PROCEDURE — 84466 ASSAY OF TRANSFERRIN: CPT

## 2021-07-27 PROCEDURE — 83735 ASSAY OF MAGNESIUM: CPT

## 2021-07-27 PROCEDURE — 82728 ASSAY OF FERRITIN: CPT

## 2021-07-27 PROCEDURE — 70470 CT HEAD/BRAIN W/O & W/DYE: CPT | Performed by: INTERNAL MEDICINE

## 2021-07-27 PROCEDURE — 36415 COLL VENOUS BLD VENIPUNCTURE: CPT

## 2021-07-27 PROCEDURE — 99215 OFFICE O/P EST HI 40 MIN: CPT | Performed by: INTERNAL MEDICINE

## 2021-07-27 PROCEDURE — 99215 OFFICE O/P EST HI 40 MIN: CPT | Performed by: NURSE PRACTITIONER

## 2021-07-27 PROCEDURE — 85025 COMPLETE CBC W/AUTO DIFF WBC: CPT

## 2021-07-27 PROCEDURE — 83540 ASSAY OF IRON: CPT

## 2021-07-27 PROCEDURE — 80053 COMPREHEN METABOLIC PANEL: CPT

## 2021-07-27 RX ORDER — SENNA AND DOCUSATE SODIUM 50; 8.6 MG/1; MG/1
2 TABLET, FILM COATED ORAL 3 TIMES DAILY
Qty: 180 TABLET | Refills: 3 | Status: SHIPPED | OUTPATIENT
Start: 2021-07-27 | End: 2022-01-21

## 2021-07-27 RX ORDER — NALOXONE HYDROCHLORIDE 4 MG/.1ML
4 SPRAY NASAL AS NEEDED
Qty: 1 KIT | Refills: 0 | Status: SHIPPED | OUTPATIENT
Start: 2021-07-27

## 2021-07-27 RX ORDER — GABAPENTIN 100 MG/1
100 CAPSULE ORAL NIGHTLY
Qty: 30 CAPSULE | Refills: 1 | Status: SHIPPED | OUTPATIENT
Start: 2021-07-27 | End: 2021-09-29

## 2021-07-27 RX ORDER — MORPHINE SULFATE 30 MG/1
30 TABLET ORAL
Qty: 150 TABLET | Refills: 0 | Status: SHIPPED | OUTPATIENT
Start: 2021-07-27 | End: 2021-08-26

## 2021-07-27 RX ORDER — FENTANYL 75 UG/H
1 PATCH TRANSDERMAL
Qty: 10 PATCH | Refills: 0 | Status: SHIPPED | OUTPATIENT
Start: 2021-07-27 | End: 2021-08-26

## 2021-07-27 RX ORDER — ONDANSETRON 4 MG/1
4 TABLET, ORALLY DISINTEGRATING ORAL EVERY 8 HOURS PRN
Qty: 60 TABLET | Refills: 0 | Status: SHIPPED | OUTPATIENT
Start: 2021-07-27 | End: 2021-09-01 | Stop reason: DRUGHIGH

## 2021-07-27 RX ORDER — CYCLOBENZAPRINE HCL 5 MG
2.5 TABLET ORAL EVERY 6 HOURS PRN
Qty: 30 TABLET | Refills: 0 | Status: SHIPPED | OUTPATIENT
Start: 2021-07-27 | End: 2021-09-01

## 2021-07-27 NOTE — PROGRESS NOTES
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  30 tablet 0   • cyclobenzaprine 5 MG Oral Tab Take 0.5 tablets (2.5 mg total) by mouth every 6 (six) hours as needed for Musc by mouth daily.          Allergies:   No Known Allergies    Past Medical History:   Diagnosis Date   • Back problem    • Cancer ([facility] [address]     Lung, spine mets   • Cataract 2007    OD   • Cataract 2007    OS   • Essential hypertension    • Exposure to medical di Patient Position: Sitting, Cuff Size: adult)   Pulse 76   Temp 97 °F (36.1 °C) (Temporal)   Resp 18   Ht 1.524 m (5')   Wt 57.6 kg (127 lb)   SpO2 100%   BMI 24.80 kg/m²    Wt Readings from Last 6 Encounters:  07/27/21 : 57.6 kg (127 lb)  07/10/21 : 54.9 k after response to therapy. Tommy Holloway discussed that with the Baptism of immunotherapy, if PD-L1 positive and no other mutations, she may have a better long-term outcome. 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  Pt with brain mets- accordingly. Follow up in 4 weeks. MDM high risk      No orders of the defined types were placed in this encounter.       Results From Past 48 Hours:  Recent Results (from the past 48 hour(s))   MAGNESIUM    Collection Time: 07/27/21  9:30 AM   Resu Eosinophil % 3.5 %    Basophil % 0.3 %    Immature Granulocyte % 0.7 %         Imaging & Referrals:  None   No orders of the defined types were placed in this encounter.

## 2021-07-27 NOTE — PROGRESS NOTES
Palliative Care Follow-Up Note     Patient Name: Juan F Mas   YOB: 1945   Medical Record Number: [id number]   Date of visit: 7/27/2021     Chief Complaint/Reason for Visit:  Patient presents with:  Palliative Care    Past Medical H worsened since cancer diagnosis  -Slight improvement in back pain since surgery with Dr. Melvin Toledo (March, 2021 and July, 2021) and with current pain med regimen  -Pain is sharp, aching, and throbbing in character  -Occasional tingling in BLE- improved with Beto Encounter for medication monitoring     Bone metastasis ([facility]     Painful orthopaedic hardware [facility])     Preop testing       Medical History:  Past Medical History:   Diagnosis Date   • Back problem    • Cancer ([facility] [address]     Lung, spine mets   • Cataract 2007 Number of children: Not on file      Years of education: Not on file      Highest education level: Not on file    Tobacco Use      Smoking status: Never Smoker      Smokeless tobacco: Never Used    Vaping Use      Vaping Use: Never used    Substance and Se lidocaine-menthol 4-1 % External Patch Place 1 patch onto the skin daily. 12 on, 12 off     • CALCIUM OR Take 600 mg by mouth daily.      • ferrous sulfate 325 (65 FE) MG Oral Tab EC Take 325 mg by mouth daily with breakfast.     • magnesium 250 MG Oral Tab for hallucinations, memory loss, substance abuse and suicidal ideas. The patient is nervous/anxious and has insomnia. Physical Examination:  Physical Exam  Vitals reviewed. Constitutional:       General: She is not in acute distress.      Appearanc Comments: Noticeably less nervous/anxious today          Palliative Care Goals of Care:  Discussed with patient/family today: Yes  Patient's preference about sharing medical information: Patient and family may receive information  Patient's decision m 30 MG Oral Tab; Take 1 tablet (30 mg total) by mouth every 4 to 6 hours as needed for Pain. Dispense: 150 tablet; Refill: 0    13. Brain metastasis ([address]    14. Therapeutic opioid-induced constipation (OIC)  - Senna-Docusate Sodium 8.6-50 MG Oral Tab;  Lugenia Bound mechanism of action of Narcan      Anxiety/depression/difficulty sleeping  -Continue Mirtazapine to 30mg at bedtime  -Previously discussed adding Effexor for depression/depressed mood; discussed the MOA and SE of med; discussed benefits of med;  Pt and akbarb Medical Exam: 5 minutes    Plan: 10 minutes      Notes: 10 minutes    Counseling/Education: 15 minutes      Referring/Communicatin minutes    Ind Interpretation:   minutes      Care Coordination: 5 minutes       My total time spent caring for the pat

## 2021-07-27 NOTE — PATIENT INSTRUCTIONS
Recent Results (from the past 24 hour(s))   MAGNESIUM    Collection Time: 07/27/21  9:30 AM   Result Value Ref Range    Magnesium 2.4 1.6 - 2.6 mg/dL   COMP METABOLIC PANEL (14)    Collection Time: 07/27/21  9:30 AM   Result Value Ref Range    Glucose 92 7

## 2021-07-28 ENCOUNTER — TELEPHONE (OUTPATIENT)
Dept: HEMATOLOGY/ONCOLOGY | Facility: HOSPITAL | Age: 76
End: 2021-07-28

## 2021-07-28 NOTE — TELEPHONE ENCOUNTER
Pat's , Diane Found, called. He would like to speak with Catherine Smith regarding medication that she was going to call in. Please call.

## 2021-07-29 ENCOUNTER — TELEPHONE (OUTPATIENT)
Dept: HEMATOLOGY/ONCOLOGY | Facility: HOSPITAL | Age: 76
End: 2021-07-29

## 2021-07-29 NOTE — TELEPHONE ENCOUNTER
Patient's  returned a call to [facility],THE. Addie Grandchild, please call patient's  when you have chance. Thank you.

## 2021-07-29 NOTE — TELEPHONE ENCOUNTER
Luz Pena is requesting refill on Mirtazapine. Samuel Ruano and Olvin Butts know that there is a refill on the script sent on 6/15/2021. Let them know to call [facility] to activate refill. If there are issues, call me. Pt/ verbalized understanding.

## 2021-08-05 ENCOUNTER — TELEPHONE (OUTPATIENT)
Dept: FAMILY MEDICINE CLINIC | Facility: CLINIC | Age: 76
End: 2021-08-05

## 2021-08-05 NOTE — TELEPHONE ENCOUNTER
called requesting to speak with Dr Slim Tatum.  Pt wants to give her an update after her surgery  Also  requested a referral for PT at home and refills for furosemide 20 MG Oral Tab and potassium  Please call back and advise

## 2021-08-06 DIAGNOSIS — C34.90 PRIMARY MALIGNANT NEOPLASM OF LUNG METASTATIC TO OTHER SITE, UNSPECIFIED LATERALITY (HCC): Primary | ICD-10-CM

## 2021-08-06 DIAGNOSIS — C79.31 BRAIN METASTASIS (HCC): ICD-10-CM

## 2021-08-06 DIAGNOSIS — M48.52XD: ICD-10-CM

## 2021-08-06 DIAGNOSIS — R29.898 WEAKNESS OF BOTH LOWER EXTREMITIES: ICD-10-CM

## 2021-08-06 DIAGNOSIS — T84.84XA PAINFUL ORTHOPAEDIC HARDWARE (HCC): ICD-10-CM

## 2021-08-06 DIAGNOSIS — G83.4 CAUDA EQUINA COMPRESSION (HCC): ICD-10-CM

## 2021-08-06 RX ORDER — FUROSEMIDE 20 MG/1
TABLET ORAL
Qty: 90 TABLET | Refills: 0 | Status: SHIPPED | OUTPATIENT
Start: 2021-08-06 | End: 2021-09-28

## 2021-08-06 RX ORDER — POTASSIUM CHLORIDE 1500 MG/1
20 TABLET, FILM COATED, EXTENDED RELEASE ORAL DAILY
Qty: 90 TABLET | Refills: 0 | Status: SHIPPED | OUTPATIENT
Start: 2021-08-06 | End: 2021-09-05

## 2021-08-10 ENCOUNTER — TELEPHONE (OUTPATIENT)
Dept: INTERNAL MEDICINE CLINIC | Facility: CLINIC | Age: 76
End: 2021-08-10

## 2021-08-10 NOTE — TELEPHONE ENCOUNTER
Kacey patient care cordinator is requesting orders for physical therapy for patient above. Also Margarette Mendoza is sending paperwork and she's also requesting latest progress notes .        Fax [phone]

## 2021-08-13 ENCOUNTER — TELEPHONE (OUTPATIENT)
Dept: INTERNAL MEDICINE CLINIC | Facility: CLINIC | Age: 76
End: 2021-08-13

## 2021-08-13 DIAGNOSIS — G89.29 CHRONIC PAIN OF LEFT KNEE: ICD-10-CM

## 2021-08-13 DIAGNOSIS — C79.31 BRAIN METASTASIS (HCC): ICD-10-CM

## 2021-08-13 DIAGNOSIS — C34.32 MALIGNANT NEOPLASM OF LOWER LOBE OF LEFT LUNG (HCC): ICD-10-CM

## 2021-08-13 DIAGNOSIS — R53.1 WEAKNESS GENERALIZED: ICD-10-CM

## 2021-08-13 DIAGNOSIS — S32.018A OTH FRACTURE OF FIRST LUMBAR VERTEBRA, INIT FOR CLOS FX (HCC): Primary | ICD-10-CM

## 2021-08-13 DIAGNOSIS — M25.562 CHRONIC PAIN OF LEFT KNEE: ICD-10-CM

## 2021-08-13 NOTE — TELEPHONE ENCOUNTER
is requesting PT orders for patient named above.      [address]     P: [phone]  F: [phone]

## 2021-08-19 ENCOUNTER — TELEPHONE (OUTPATIENT)
Dept: HEMATOLOGY/ONCOLOGY | Facility: HOSPITAL | Age: 76
End: 2021-08-19

## 2021-08-19 NOTE — TELEPHONE ENCOUNTER
I called and spoke with Vickycharly Roblero and Corky Marr to check on Vicky Roblero and her pain control. Vicky Roblero is doing well. She started PT. Pain under good control on current pain med regimen. Kemi Bates know that I will send refills for Fentanyl and MS IRon 8/26/2021.

## 2021-08-26 DIAGNOSIS — C79.51 METASTASIS TO VERTEBRAL COLUMN OF UNKNOWN ORIGIN (HCC): ICD-10-CM

## 2021-08-26 DIAGNOSIS — G89.3 CANCER RELATED PAIN: ICD-10-CM

## 2021-08-26 DIAGNOSIS — C80.1 METASTASIS TO VERTEBRAL COLUMN OF UNKNOWN ORIGIN (HCC): ICD-10-CM

## 2021-08-26 RX ORDER — FENTANYL 75 UG/H
1 PATCH TRANSDERMAL
Qty: 10 PATCH | Refills: 0 | Status: SHIPPED | OUTPATIENT
Start: 2021-08-26 | End: 2021-09-29

## 2021-08-26 RX ORDER — MORPHINE SULFATE 30 MG/1
30 TABLET ORAL
Qty: 150 TABLET | Refills: 0 | Status: SHIPPED | OUTPATIENT
Start: 2021-08-26 | End: 2021-09-29

## 2021-09-01 ENCOUNTER — OFFICE VISIT (OUTPATIENT)
Dept: HEMATOLOGY/ONCOLOGY | Facility: HOSPITAL | Age: 76
End: 2021-09-01
Attending: NURSE PRACTITIONER
Payer: COMMERCIAL

## 2021-09-01 ENCOUNTER — OFFICE VISIT (OUTPATIENT)
Dept: HEMATOLOGY/ONCOLOGY | Facility: HOSPITAL | Age: 76
End: 2021-09-01
Attending: INTERNAL MEDICINE
Payer: COMMERCIAL

## 2021-09-01 ENCOUNTER — TELEPHONE (OUTPATIENT)
Dept: FAMILY MEDICINE CLINIC | Facility: CLINIC | Age: 76
End: 2021-09-01

## 2021-09-01 VITALS
OXYGEN SATURATION: 100 % | DIASTOLIC BLOOD PRESSURE: 41 MMHG | TEMPERATURE: 98 F | HEART RATE: 78 BPM | RESPIRATION RATE: 18 BRPM | SYSTOLIC BLOOD PRESSURE: 136 MMHG

## 2021-09-01 VITALS
OXYGEN SATURATION: 100 % | DIASTOLIC BLOOD PRESSURE: 41 MMHG | BODY MASS INDEX: 23.24 KG/M2 | RESPIRATION RATE: 18 BRPM | HEART RATE: 78 BPM | WEIGHT: 118.38 LBS | SYSTOLIC BLOOD PRESSURE: 136 MMHG | TEMPERATURE: 98 F | HEIGHT: 60 IN

## 2021-09-01 DIAGNOSIS — Z51.81 ENCOUNTER FOR MEDICATION MONITORING: ICD-10-CM

## 2021-09-01 DIAGNOSIS — Z71.89 GOALS OF CARE, COUNSELING/DISCUSSION: ICD-10-CM

## 2021-09-01 DIAGNOSIS — T40.2X5A THERAPEUTIC OPIOID INDUCED CONSTIPATION: ICD-10-CM

## 2021-09-01 DIAGNOSIS — C34.32 MALIGNANT NEOPLASM OF LOWER LOBE OF LEFT LUNG (HCC): ICD-10-CM

## 2021-09-01 DIAGNOSIS — F41.9 ANXIETY: ICD-10-CM

## 2021-09-01 DIAGNOSIS — F11.90 CHRONIC, CONTINUOUS USE OF OPIOIDS: ICD-10-CM

## 2021-09-01 DIAGNOSIS — R11.0 NAUSEA: Primary | ICD-10-CM

## 2021-09-01 DIAGNOSIS — C80.1 METASTASIS TO VERTEBRAL COLUMN OF UNKNOWN ORIGIN (HCC): ICD-10-CM

## 2021-09-01 DIAGNOSIS — F40.240 CLAUSTROPHOBIA: ICD-10-CM

## 2021-09-01 DIAGNOSIS — Z51.5 PALLIATIVE CARE ENCOUNTER: ICD-10-CM

## 2021-09-01 DIAGNOSIS — C79.51 BONE METASTASIS (HCC): ICD-10-CM

## 2021-09-01 DIAGNOSIS — F32.A DEPRESSION, UNSPECIFIED DEPRESSION TYPE: ICD-10-CM

## 2021-09-01 DIAGNOSIS — C79.51 METASTASIS TO VERTEBRAL COLUMN OF UNKNOWN ORIGIN (HCC): ICD-10-CM

## 2021-09-01 DIAGNOSIS — K59.03 THERAPEUTIC OPIOID INDUCED CONSTIPATION: ICD-10-CM

## 2021-09-01 DIAGNOSIS — G47.9 DIFFICULTY SLEEPING: ICD-10-CM

## 2021-09-01 DIAGNOSIS — E78.2 MIXED HYPERLIPIDEMIA: ICD-10-CM

## 2021-09-01 DIAGNOSIS — R23.8 SKIN BREAKDOWN: ICD-10-CM

## 2021-09-01 DIAGNOSIS — G89.3 CANCER RELATED PAIN: ICD-10-CM

## 2021-09-01 DIAGNOSIS — C79.31 BRAIN METASTASIS (HCC): ICD-10-CM

## 2021-09-01 DIAGNOSIS — D64.9 ANEMIA, UNSPECIFIED TYPE: ICD-10-CM

## 2021-09-01 DIAGNOSIS — C34.32 MALIGNANT NEOPLASM OF LOWER LOBE OF LEFT LUNG (HCC): Primary | ICD-10-CM

## 2021-09-01 DIAGNOSIS — C34.90 PRIMARY MALIGNANT NEOPLASM OF LUNG METASTATIC TO OTHER SITE (HCC): ICD-10-CM

## 2021-09-01 DIAGNOSIS — Z71.89 ADVANCE CARE PLANNING: ICD-10-CM

## 2021-09-01 DIAGNOSIS — M62.838 MUSCLE SPASM: ICD-10-CM

## 2021-09-01 DIAGNOSIS — Z51.81 MEDICATION MONITORING ENCOUNTER: ICD-10-CM

## 2021-09-01 LAB
ALBUMIN SERPL-MCNC: 4 G/DL (ref 3.4–5)
ALBUMIN/GLOB SERPL: 1.3 {RATIO} (ref 1–2)
ALP LIVER SERPL-CCNC: 100 U/L
ALT SERPL-CCNC: 18 U/L
ANION GAP SERPL CALC-SCNC: 5 MMOL/L (ref 0–18)
AST SERPL-CCNC: 16 U/L (ref 15–37)
BASOPHILS # BLD AUTO: 0.02 X10(3) UL (ref 0–0.2)
BASOPHILS NFR BLD AUTO: 0.6 %
BILIRUB SERPL-MCNC: 0.3 MG/DL (ref 0.1–2)
BUN BLD-MCNC: 9 MG/DL (ref 7–18)
BUN/CREAT SERPL: 15 (ref 10–20)
CALCIUM BLD-MCNC: 9.4 MG/DL (ref 8.5–10.1)
CHLORIDE SERPL-SCNC: 102 MMOL/L (ref 98–112)
CHOLEST SMN-MCNC: 160 MG/DL (ref ?–200)
CO2 SERPL-SCNC: 28 MMOL/L (ref 21–32)
CREAT BLD-MCNC: 0.6 MG/DL
DEPRECATED HBV CORE AB SER IA-ACNC: 481.4 NG/ML
DEPRECATED RDW RBC AUTO: 48.2 FL (ref 35.1–46.3)
EOSINOPHIL # BLD AUTO: 0.17 X10(3) UL (ref 0–0.7)
EOSINOPHIL NFR BLD AUTO: 5.5 %
ERYTHROCYTE [DISTWIDTH] IN BLOOD BY AUTOMATED COUNT: 15.3 % (ref 11–15)
GLOBULIN PLAS-MCNC: 3.2 G/DL (ref 2.8–4.4)
GLUCOSE BLD-MCNC: 116 MG/DL (ref 70–99)
HAV IGM SER QL: 2 MG/DL (ref 1.6–2.6)
HCT VFR BLD AUTO: 32 %
HDLC SERPL-MCNC: 105 MG/DL (ref 40–59)
HGB BLD-MCNC: 9.4 G/DL
IMM GRANULOCYTES # BLD AUTO: 0 X10(3) UL (ref 0–1)
IMM GRANULOCYTES NFR BLD: 0 %
IRON SATURATION: 13 %
IRON SERPL-MCNC: 47 UG/DL
LDLC SERPL CALC-MCNC: 33 MG/DL (ref ?–100)
LYMPHOCYTES # BLD AUTO: 0.83 X10(3) UL (ref 1–4)
LYMPHOCYTES NFR BLD AUTO: 26.7 %
M PROTEIN MFR SERPL ELPH: 7.2 G/DL (ref 6.4–8.2)
MCH RBC QN AUTO: 25.4 PG (ref 26–34)
MCHC RBC AUTO-ENTMCNC: 29.4 G/DL (ref 31–37)
MCV RBC AUTO: 86.5 FL
MONOCYTES # BLD AUTO: 0.35 X10(3) UL (ref 0.1–1)
MONOCYTES NFR BLD AUTO: 11.3 %
NEUTROPHILS # BLD AUTO: 1.74 X10 (3) UL (ref 1.5–7.7)
NEUTROPHILS # BLD AUTO: 1.74 X10(3) UL (ref 1.5–7.7)
NEUTROPHILS NFR BLD AUTO: 55.9 %
NONHDLC SERPL-MCNC: 55 MG/DL (ref ?–130)
OSMOLALITY SERPL CALC.SUM OF ELEC: 280 MOSM/KG (ref 275–295)
PATIENT FASTING Y/N/NP: NO
PLATELET # BLD AUTO: 143 10(3)UL (ref 150–450)
POTASSIUM SERPL-SCNC: 4.4 MMOL/L (ref 3.5–5.1)
RBC # BLD AUTO: 3.7 X10(6)UL
SODIUM SERPL-SCNC: 135 MMOL/L (ref 136–145)
TOTAL IRON BINDING CAPACITY: 368 UG/DL (ref 240–450)
TRANSFERRIN SERPL-MCNC: 247 MG/DL (ref 200–360)
TRIGL SERPL-MCNC: 133 MG/DL (ref 30–149)
VLDLC SERPL CALC-MCNC: 18 MG/DL (ref 0–30)
WBC # BLD AUTO: 3.1 X10(3) UL (ref 4–11)

## 2021-09-01 PROCEDURE — 99215 OFFICE O/P EST HI 40 MIN: CPT | Performed by: NURSE PRACTITIONER

## 2021-09-01 PROCEDURE — 36415 COLL VENOUS BLD VENIPUNCTURE: CPT

## 2021-09-01 PROCEDURE — 82728 ASSAY OF FERRITIN: CPT

## 2021-09-01 PROCEDURE — 80061 LIPID PANEL: CPT

## 2021-09-01 PROCEDURE — 83540 ASSAY OF IRON: CPT

## 2021-09-01 PROCEDURE — 84466 ASSAY OF TRANSFERRIN: CPT

## 2021-09-01 PROCEDURE — 80053 COMPREHEN METABOLIC PANEL: CPT

## 2021-09-01 PROCEDURE — 85025 COMPLETE CBC W/AUTO DIFF WBC: CPT

## 2021-09-01 PROCEDURE — 83735 ASSAY OF MAGNESIUM: CPT

## 2021-09-01 PROCEDURE — 99215 OFFICE O/P EST HI 40 MIN: CPT | Performed by: INTERNAL MEDICINE

## 2021-09-01 RX ORDER — ALPRAZOLAM 0.25 MG/1
0.25 TABLET ORAL 2 TIMES DAILY PRN
Qty: 4 TABLET | Refills: 0 | Status: SHIPPED | OUTPATIENT
Start: 2021-09-01 | End: 2021-11-29 | Stop reason: DRUGHIGH

## 2021-09-01 RX ORDER — CYCLOBENZAPRINE HCL 5 MG
2.5 TABLET ORAL EVERY 6 HOURS PRN
Qty: 30 TABLET | Refills: 0 | Status: SHIPPED | OUTPATIENT
Start: 2021-09-01 | End: 2021-11-29

## 2021-09-01 RX ORDER — ONDANSETRON 4 MG/1
4 TABLET, FILM COATED ORAL EVERY 12 HOURS PRN
Qty: 60 TABLET | Refills: 2 | Status: SHIPPED | OUTPATIENT
Start: 2021-09-01 | End: 2021-09-29 | Stop reason: DRUGHIGH

## 2021-09-01 RX ORDER — PSEUDOEPHEDRINE HCL 30 MG
100 TABLET ORAL 2 TIMES DAILY
Qty: 60 CAPSULE | Refills: 2 | Status: SHIPPED | OUTPATIENT
Start: 2021-09-01 | End: 2022-01-21

## 2021-09-01 RX ORDER — VENLAFAXINE HYDROCHLORIDE 37.5 MG/1
37.5 CAPSULE, EXTENDED RELEASE ORAL DAILY
Qty: 30 CAPSULE | Refills: 3 | Status: SHIPPED | OUTPATIENT
Start: 2021-09-01 | End: 2021-11-29

## 2021-09-01 NOTE — PROGRESS NOTES
Palliative Care Follow-Up Note     Patient Name: Judd Young   YOB: 1945   Medical Record Number: [id number]   Date of visit: 9/1/2021     Chief Complaint/Reason for Visit:  Patient presents with:  Palliative Care    Past Medical Hi pain; chronic, but has worsened since cancer diagnosis  -Slight improvement in back pain since surgery with Dr. Oscar Monteiro (March, 2021 and July, 2021) and with current pain med regimen  -Pain is sharp, aching, and throbbing in character  -Occasional tingling in [facility]     Painful orthopaedic hardware [facility])     Preop testing       Medical History:  Past Medical History:   Diagnosis Date   • Back problem    • Cancer ([facility] [address]     Lung, spine mets   • Cataract 2007    OD   • Cataract 2007    OS   • Essent education: Not on file      Highest education level: Not on file    Tobacco Use      Smoking status: Never Smoker      Smokeless tobacco: Never Used    Vaping Use      Vaping Use: Never used    Substance and Sexual Activity      Alcohol use: No        Alco remains unresponsive, repeat dose in other nostril 2-5 minutes after first dose. Call 911 immediately after Narcan administered 1 kit 0   • lidocaine-menthol 4-1 % External Patch Place 1 patch onto the skin daily.  12 on, 12 off     • CALCIUM OR Take 600 mg Gastrointestinal: Positive for constipation (See HPI). Negative for abdominal pain, diarrhea, nausea and vomiting. Genitourinary: Negative. Negative for dysuria, frequency, hematuria and urgency. Musculoskeletal: Positive for back pain (see HPI).  Ne abdominal tenderness. There is no guarding. Musculoskeletal:      Cervical back: Normal range of motion and neck supple. Right lower leg: Edema (Trace) present. Left lower leg: Edema (Trace) present. Skin:     General: Skin is warm and dry. 2    4. Nausea  - ondansetron (ZOFRAN) 4 mg tablet; Take 1 tablet (4 mg total) by mouth every 12 (twelve) hours as needed for Nausea. Dispense: 60 tablet; Refill: 2    5. Anxiety    6. Difficulty sleeping    7.  Depression, unspecified depression type    8 provided today to keep on hand at home  -Family/friend to administer Narcan if:   • patient unresponsive  • if breathing slows or stops/lips   • fingernails turn gray or blue  • pale or clammy skin  -Advised pt/family that 911 should be called immediately with the patient today, which included all of the following:direct face to face contact, history taking, physical examination, and >50% was spent counseling and coordinating care.     Thank you for allowing the Palliative Care Team to participate in the car

## 2021-09-01 NOTE — PROGRESS NOTES
KIYA Marr is a 68year old female here for follow up of Malignant neoplasm of lower lobe of left lung (hcc)  (primary encounter diagnosis)  Bone metastasis (hcc)  Brain metastasis (hcc)  Metastasis to vertebral column of unknown origin ( capsule (37.5 mg total) by mouth daily. 30 capsule 3   • mupirocin 2 % External Ointment Apply 1 Application topically 3 (three) times daily.  30 g 2   • ondansetron (ZOFRAN) 4 mg tablet Take 1 tablet (4 mg total) by mouth every 12 (twelve) hours as needed daily. Take 2 tabs 160 mg total      • mirtazapine 30 MG Oral Tab Take 1 tablet (30 mg total) by mouth nightly.  30 tablet 1   • simvastatin 10 MG Oral Tab TAKE ONE TABLET BY MOUTH EVERY DAY IN THE EVENING 90 tablet 4   • PEG 3350 17 g Oral Powd Pack Take 1 hyperlipidemia   • Glaucoma Mother    • Hypertension Father    • Heart Disease Father    • Stroke Father 64        (cause of death)   • Breast Cancer Sister 62        (casue of death)   • Hypertension Paternal Grandfather    • Hypertension Paternal non-smoking female, patient sent, discussed with patient and her son via phone that risk but likelihood that she may have an EGFR mutation, and the for patients in the metastatic setting with EGFR mutations, treatment with osimertinib is highly effective, brain metastasis of 160 mg a day. --She is responding to therapy, with evidence of a brain metastases smaller in size, and no new intracranial lesions per last MRI in June. She is due for further tumor assessment as above.   If NEELA, will have the MRI perf Time: 09/01/21 10:15 AM   Result Value Ref Range    Ferritin 481.4 (H) 18.0 - 340.0 ng/mL   CBC W/ DIFFERENTIAL    Collection Time: 09/01/21 10:15 AM   Result Value Ref Range    WBC 3.1 (L) 4.0 - 11.0 x10(3) uL    RBC 3.70 (L) 3.80 - 5.30 x10(6)uL    HGB 9

## 2021-09-01 NOTE — TELEPHONE ENCOUNTER
Ashley from [facility] called requesting pre op notes from who ever did her surgery  To please fax it over to [phone] maria a Ramirez

## 2021-09-01 NOTE — TELEPHONE ENCOUNTER
Called [place] lab and they're able to run the lipid panel per  order. Called patient  and told his that the lab was able to run the lipid panel. He also wanted her vitamin D checked as well but their wasn't an order put in placed.  I told hi

## 2021-09-07 DIAGNOSIS — C34.32 MALIGNANT NEOPLASM OF LOWER LOBE OF LEFT LUNG (HCC): Primary | ICD-10-CM

## 2021-09-08 NOTE — TELEPHONE ENCOUNTER
Labs have been ordered.  was informed. Patient's  was particularly concerned about the HDL level being too high. He wanted a repeat lipid panel at the upcoming visit with Dr. Karo Berry.  He was informed that that is too soon for a repeat.  Usual

## 2021-09-23 ENCOUNTER — OFFICE VISIT (OUTPATIENT)
Dept: INTERNAL MEDICINE CLINIC | Facility: CLINIC | Age: 76
End: 2021-09-23
Payer: COMMERCIAL

## 2021-09-23 ENCOUNTER — HOSPITAL ENCOUNTER (OUTPATIENT)
Dept: MAMMOGRAPHY | Facility: HOSPITAL | Age: 76
Discharge: HOME | End: 2021-09-23
Attending: INTERNAL MEDICINE
Payer: COMMERCIAL

## 2021-09-23 VITALS
HEIGHT: 61 IN | BODY MASS INDEX: 21.34 KG/M2 | OXYGEN SATURATION: 99 % | HEART RATE: 81 BPM | DIASTOLIC BLOOD PRESSURE: 82 MMHG | SYSTOLIC BLOOD PRESSURE: 130 MMHG | WEIGHT: 113 LBS | TEMPERATURE: 99 F

## 2021-09-23 DIAGNOSIS — C34.32 MALIGNANT NEOPLASM OF LOWER LOBE OF LEFT LUNG (HCC): ICD-10-CM

## 2021-09-23 DIAGNOSIS — Z00.00 GENERAL MEDICAL EXAM: Primary | ICD-10-CM

## 2021-09-23 DIAGNOSIS — Z12.31 VISIT FOR SCREENING MAMMOGRAM: ICD-10-CM

## 2021-09-23 DIAGNOSIS — R23.8 SKIN BREAKDOWN: ICD-10-CM

## 2021-09-23 DIAGNOSIS — E87.6 HYPOKALEMIA: ICD-10-CM

## 2021-09-23 LAB
ANION GAP SERPL CALC-SCNC: 9 MMOL/L (ref 0–18)
BUN BLD-MCNC: 8 MG/DL (ref 7–18)
BUN/CREAT SERPL: 12.9 (ref 10–20)
CALCIUM BLD-MCNC: 9.9 MG/DL (ref 8.5–10.1)
CHLORIDE SERPL-SCNC: 101 MMOL/L (ref 98–112)
CO2 SERPL-SCNC: 28 MMOL/L (ref 21–32)
CREAT BLD-MCNC: 0.62 MG/DL
GLUCOSE BLD-MCNC: 122 MG/DL (ref 70–99)
OSMOLALITY SERPL CALC.SUM OF ELEC: 286 MOSM/KG (ref 275–295)
PATIENT FASTING Y/N/NP: YES
POTASSIUM SERPL-SCNC: 4.3 MMOL/L (ref 3.5–5.1)
SODIUM SERPL-SCNC: 138 MMOL/L (ref 136–145)
VIT D+METAB SERPL-MCNC: 49 NG/ML (ref 30–100)

## 2021-09-23 PROCEDURE — 82306 VITAMIN D 25 HYDROXY: CPT | Performed by: INTERNAL MEDICINE

## 2021-09-23 PROCEDURE — 3008F BODY MASS INDEX DOCD: CPT | Performed by: INTERNAL MEDICINE

## 2021-09-23 PROCEDURE — 3075F SYST BP GE 130 - 139MM HG: CPT | Performed by: INTERNAL MEDICINE

## 2021-09-23 PROCEDURE — 99397 PER PM REEVAL EST PAT 65+ YR: CPT | Performed by: INTERNAL MEDICINE

## 2021-09-23 PROCEDURE — 3079F DIAST BP 80-89 MM HG: CPT | Performed by: INTERNAL MEDICINE

## 2021-09-23 PROCEDURE — 77063 BREAST TOMOSYNTHESIS BI: CPT | Performed by: INTERNAL MEDICINE

## 2021-09-23 PROCEDURE — 77067 SCR MAMMO BI INCL CAD: CPT | Performed by: INTERNAL MEDICINE

## 2021-09-23 PROCEDURE — 80048 BASIC METABOLIC PNL TOTAL CA: CPT | Performed by: INTERNAL MEDICINE

## 2021-09-23 RX ORDER — SIMVASTATIN 10 MG
TABLET ORAL
Qty: 90 TABLET | Refills: 4 | Status: SHIPPED | OUTPATIENT
Start: 2021-09-23

## 2021-09-23 NOTE — PROGRESS NOTES
Kenneth Garcia is a 68year old female who presents for a complete physical exam.    Tonya Braxton has Metastatic lung ca , stage IVB 9 cN2, cM1c), involving bone and brain. She has metastases to vertebral column. She had  Pathologic fracture of spine.  Yoko Barrett tablet (0.25 mg total) by mouth 2 (two) times daily as needed for Anxiety (Take 1 tablet 1 hour before PET and MRI scans for anxiety and clautrophobia; if still anxious after 30 minutes, may take another tablet).  4 tablet 0   • venlafaxine 37.5 MG Oral Cap MG Oral Tab 1 tab Prn for pedal  edema (Patient not taking: Reported on 9/23/2021) 90 tablet 0   • Vitamin B-12 1000 MCG Oral Tab Take 1,000 mcg by mouth daily.         Past Medical History:   Diagnosis Date   • Back problem    • Cancer ([address]     Lung, spin No      Alcohol/week: 0.0 standard drinks    Drug use: Not Currently    Social History: Occ: retired nurse . : yes. Children: yes.   Exercise: minimal.  Diet: watches fats closely and watches sugar closely     REVIEW OF SYSTEMS:   GENERAL: feels well 35.0 - 48.0 %   32.0 (L)   MCV      80.0 - 100.0 fL   86.5   MCH      26.0 - 34.0 pg   25.4 (L)   MCHC      31.0 - 37.0 g/dL   29.4 (L)   RDW-SD      35.1 - 46.3 fL   48.2 (H)   RDW      11.0 - 15.0 %   15.3 (H)   Platelet Count      244.6 - 450.0 10(3)uL 33   VLDL      0 - 30 mg/dL   18   NON HDL CHOL      <130 mg/dL   55   Iron, Serum      50 - 170 ug/dL   47 (L)   Transferrin      200 - 360 mg/dL   247   Iron Bind. Cap.(TIBC)      240 - 450 ug/dL   368   Iron Saturation      15 - 50 %   13 (L)   Magnesium

## 2021-09-24 ENCOUNTER — HOSPITAL ENCOUNTER (OUTPATIENT)
Dept: NUCLEAR MEDICINE | Facility: HOSPITAL | Age: 76
Discharge: HOME | End: 2021-09-24
Attending: INTERNAL MEDICINE
Payer: COMMERCIAL

## 2021-09-24 ENCOUNTER — HOSPITAL ENCOUNTER (OUTPATIENT)
Dept: MRI IMAGING | Facility: HOSPITAL | Age: 76
Discharge: HOME | End: 2021-09-24
Attending: INTERNAL MEDICINE
Payer: COMMERCIAL

## 2021-09-24 DIAGNOSIS — C79.31 BRAIN METASTASIS (HCC): ICD-10-CM

## 2021-09-24 DIAGNOSIS — C79.51 BONE METASTASIS (HCC): ICD-10-CM

## 2021-09-24 DIAGNOSIS — C34.32 MALIGNANT NEOPLASM OF LOWER LOBE OF LEFT LUNG (HCC): ICD-10-CM

## 2021-09-24 DIAGNOSIS — C79.51 METASTASIS TO VERTEBRAL COLUMN OF UNKNOWN ORIGIN (HCC): ICD-10-CM

## 2021-09-24 DIAGNOSIS — C80.1 METASTASIS TO VERTEBRAL COLUMN OF UNKNOWN ORIGIN (HCC): ICD-10-CM

## 2021-09-24 LAB — GLUCOSE BLDC GLUCOMTR-MCNC: 113 MG/DL (ref 70–99)

## 2021-09-24 PROCEDURE — A9575 INJ GADOTERATE MEGLUMI 0.1ML: HCPCS | Performed by: INTERNAL MEDICINE

## 2021-09-24 PROCEDURE — 78815 PET IMAGE W/CT SKULL-THIGH: CPT | Performed by: INTERNAL MEDICINE

## 2021-09-24 PROCEDURE — 70553 MRI BRAIN STEM W/O & W/DYE: CPT | Performed by: INTERNAL MEDICINE

## 2021-09-24 PROCEDURE — 82962 GLUCOSE BLOOD TEST: CPT

## 2021-09-28 RX ORDER — FUROSEMIDE 20 MG/1
TABLET ORAL
Qty: 90 TABLET | Refills: 0 | Status: SHIPPED | OUTPATIENT
Start: 2021-09-28

## 2021-09-29 ENCOUNTER — OFFICE VISIT (OUTPATIENT)
Dept: HEMATOLOGY/ONCOLOGY | Facility: HOSPITAL | Age: 76
End: 2021-09-29
Attending: INTERNAL MEDICINE
Payer: COMMERCIAL

## 2021-09-29 ENCOUNTER — OFFICE VISIT (OUTPATIENT)
Dept: HEMATOLOGY/ONCOLOGY | Facility: HOSPITAL | Age: 76
End: 2021-09-29
Attending: NURSE PRACTITIONER
Payer: COMMERCIAL

## 2021-09-29 VITALS
HEIGHT: 60 IN | OXYGEN SATURATION: 100 % | RESPIRATION RATE: 18 BRPM | BODY MASS INDEX: 22.81 KG/M2 | HEART RATE: 71 BPM | DIASTOLIC BLOOD PRESSURE: 63 MMHG | TEMPERATURE: 99 F | SYSTOLIC BLOOD PRESSURE: 142 MMHG | WEIGHT: 116.19 LBS

## 2021-09-29 VITALS
BODY MASS INDEX: 22.81 KG/M2 | WEIGHT: 116.19 LBS | RESPIRATION RATE: 18 BRPM | DIASTOLIC BLOOD PRESSURE: 63 MMHG | SYSTOLIC BLOOD PRESSURE: 142 MMHG | HEART RATE: 71 BPM | HEIGHT: 60 IN | OXYGEN SATURATION: 100 % | TEMPERATURE: 99 F

## 2021-09-29 DIAGNOSIS — C34.32 MALIGNANT NEOPLASM OF LOWER LOBE OF LEFT LUNG (HCC): ICD-10-CM

## 2021-09-29 DIAGNOSIS — Z51.81 MEDICATION MONITORING ENCOUNTER: ICD-10-CM

## 2021-09-29 DIAGNOSIS — C79.51 BONE METASTASIS (HCC): ICD-10-CM

## 2021-09-29 DIAGNOSIS — G89.3 CANCER RELATED PAIN: Primary | ICD-10-CM

## 2021-09-29 DIAGNOSIS — Z51.81 ENCOUNTER FOR MEDICATION MONITORING: ICD-10-CM

## 2021-09-29 DIAGNOSIS — Z51.5 PALLIATIVE CARE ENCOUNTER: ICD-10-CM

## 2021-09-29 DIAGNOSIS — F41.9 ANXIETY: ICD-10-CM

## 2021-09-29 DIAGNOSIS — C80.1 METASTASIS TO VERTEBRAL COLUMN OF UNKNOWN ORIGIN (HCC): ICD-10-CM

## 2021-09-29 DIAGNOSIS — K59.03 THERAPEUTIC OPIOID INDUCED CONSTIPATION: ICD-10-CM

## 2021-09-29 DIAGNOSIS — C79.51 METASTASIS TO VERTEBRAL COLUMN OF UNKNOWN ORIGIN (HCC): ICD-10-CM

## 2021-09-29 DIAGNOSIS — Z71.89 ADVANCE CARE PLANNING: ICD-10-CM

## 2021-09-29 DIAGNOSIS — C34.32 MALIGNANT NEOPLASM OF LOWER LOBE OF LEFT LUNG (HCC): Primary | ICD-10-CM

## 2021-09-29 DIAGNOSIS — G47.9 DIFFICULTY SLEEPING: ICD-10-CM

## 2021-09-29 DIAGNOSIS — F11.90 CHRONIC, CONTINUOUS USE OF OPIOIDS: ICD-10-CM

## 2021-09-29 DIAGNOSIS — C34.90 PRIMARY MALIGNANT NEOPLASM OF LUNG METASTATIC TO OTHER SITE (HCC): ICD-10-CM

## 2021-09-29 DIAGNOSIS — Z71.89 GOALS OF CARE, COUNSELING/DISCUSSION: ICD-10-CM

## 2021-09-29 DIAGNOSIS — T40.2X5A THERAPEUTIC OPIOID INDUCED CONSTIPATION: ICD-10-CM

## 2021-09-29 DIAGNOSIS — C79.31 BRAIN METASTASIS (HCC): ICD-10-CM

## 2021-09-29 DIAGNOSIS — F32.A DEPRESSION, UNSPECIFIED DEPRESSION TYPE: ICD-10-CM

## 2021-09-29 DIAGNOSIS — R11.0 NAUSEA: ICD-10-CM

## 2021-09-29 LAB
ALBUMIN SERPL-MCNC: 3.7 G/DL (ref 3.4–5)
ALBUMIN/GLOB SERPL: 1.2 {RATIO} (ref 1–2)
ALP LIVER SERPL-CCNC: 78 U/L
ALT SERPL-CCNC: 15 U/L
ANION GAP SERPL CALC-SCNC: 7 MMOL/L (ref 0–18)
AST SERPL-CCNC: 14 U/L (ref 15–37)
BASOPHILS # BLD AUTO: 0.02 X10(3) UL (ref 0–0.2)
BASOPHILS NFR BLD AUTO: 0.4 %
BILIRUB SERPL-MCNC: 0.4 MG/DL (ref 0.1–2)
BUN BLD-MCNC: 10 MG/DL (ref 7–18)
BUN/CREAT SERPL: 18.5 (ref 10–20)
CALCIUM BLD-MCNC: 9.3 MG/DL (ref 8.5–10.1)
CHLORIDE SERPL-SCNC: 100 MMOL/L (ref 98–112)
CO2 SERPL-SCNC: 29 MMOL/L (ref 21–32)
CREAT BLD-MCNC: 0.54 MG/DL
DEPRECATED RDW RBC AUTO: 49.1 FL (ref 35.1–46.3)
EOSINOPHIL # BLD AUTO: 0.16 X10(3) UL (ref 0–0.7)
EOSINOPHIL NFR BLD AUTO: 3.2 %
ERYTHROCYTE [DISTWIDTH] IN BLOOD BY AUTOMATED COUNT: 15.9 % (ref 11–15)
GLOBULIN PLAS-MCNC: 3.1 G/DL (ref 2.8–4.4)
GLUCOSE BLD-MCNC: 107 MG/DL (ref 70–99)
HCT VFR BLD AUTO: 30 %
HGB BLD-MCNC: 9.3 G/DL
IMM GRANULOCYTES # BLD AUTO: 0.02 X10(3) UL (ref 0–1)
IMM GRANULOCYTES NFR BLD: 0.4 %
LYMPHOCYTES # BLD AUTO: 1.06 X10(3) UL (ref 1–4)
LYMPHOCYTES NFR BLD AUTO: 21.4 %
MAGNESIUM SERPL-MCNC: 2 MG/DL (ref 1.6–2.6)
MCH RBC QN AUTO: 26.1 PG (ref 26–34)
MCHC RBC AUTO-ENTMCNC: 31 G/DL (ref 31–37)
MCV RBC AUTO: 84 FL
MONOCYTES # BLD AUTO: 0.43 X10(3) UL (ref 0.1–1)
MONOCYTES NFR BLD AUTO: 8.7 %
NEUTROPHILS # BLD AUTO: 3.27 X10 (3) UL (ref 1.5–7.7)
NEUTROPHILS # BLD AUTO: 3.27 X10(3) UL (ref 1.5–7.7)
NEUTROPHILS NFR BLD AUTO: 65.9 %
OSMOLALITY SERPL CALC.SUM OF ELEC: 282 MOSM/KG (ref 275–295)
PATIENT FASTING Y/N/NP: NO
PLATELET # BLD AUTO: 160 10(3)UL (ref 150–450)
POTASSIUM SERPL-SCNC: 4.2 MMOL/L (ref 3.5–5.1)
PROT SERPL-MCNC: 6.8 G/DL (ref 6.4–8.2)
RBC # BLD AUTO: 3.57 X10(6)UL
SODIUM SERPL-SCNC: 136 MMOL/L (ref 136–145)
WBC # BLD AUTO: 5 X10(3) UL (ref 4–11)

## 2021-09-29 PROCEDURE — 99215 OFFICE O/P EST HI 40 MIN: CPT | Performed by: NURSE PRACTITIONER

## 2021-09-29 PROCEDURE — 99215 OFFICE O/P EST HI 40 MIN: CPT | Performed by: INTERNAL MEDICINE

## 2021-09-29 PROCEDURE — 83735 ASSAY OF MAGNESIUM: CPT

## 2021-09-29 PROCEDURE — 85025 COMPLETE CBC W/AUTO DIFF WBC: CPT

## 2021-09-29 PROCEDURE — 36415 COLL VENOUS BLD VENIPUNCTURE: CPT

## 2021-09-29 PROCEDURE — 80053 COMPREHEN METABOLIC PANEL: CPT

## 2021-09-29 RX ORDER — FENTANYL 75 UG/H
1 PATCH TRANSDERMAL
Qty: 10 PATCH | Refills: 0 | Status: SHIPPED | OUTPATIENT
Start: 2021-09-29 | End: 2021-10-28

## 2021-09-29 RX ORDER — ONDANSETRON 4 MG/1
4 TABLET, ORALLY DISINTEGRATING ORAL EVERY 8 HOURS PRN
Qty: 60 TABLET | Refills: 0 | Status: SHIPPED | OUTPATIENT
Start: 2021-09-29 | End: 2022-02-02 | Stop reason: DRUGHIGH

## 2021-09-29 RX ORDER — MORPHINE SULFATE 30 MG/1
30 TABLET ORAL
Qty: 150 TABLET | Refills: 0 | Status: SHIPPED | OUTPATIENT
Start: 2021-09-29 | End: 2021-10-28

## 2021-09-29 RX ORDER — GABAPENTIN 100 MG/1
100 CAPSULE ORAL NIGHTLY
Qty: 30 CAPSULE | Refills: 1 | Status: SHIPPED | OUTPATIENT
Start: 2021-09-29 | End: 2021-11-29

## 2021-09-29 RX ORDER — MIRTAZAPINE 30 MG/1
30 TABLET, FILM COATED ORAL NIGHTLY
Qty: 30 TABLET | Refills: 1 | Status: SHIPPED | OUTPATIENT
Start: 2021-09-29 | End: 2021-10-28

## 2021-09-29 NOTE — PROGRESS NOTES
Palliative Care Follow-Up Note     Patient Name: Liang Mcgregor   YOB: 1945   Medical Record Number: [id number]   Date of visit: 9/29/2021     Chief Complaint/Reason for Visit:  Patient presents with:  Palliative Care    Past Medical H Dr. Gleason Masters (March, 2021 and July, 2021) and with current pain med regimen  -Pain is sharp, aching, and throbbing in character  -Occasional tingling in BLE- improved with Gabapentin  _Denies changes in bowel/bladder habits  -Alleviating factors include pain m Medical History:   Diagnosis Date   • Back problem    • Cancer [facility])     Lung, spine mets   • Cataract 2007    OD   • Cataract 2007    OS   • Essential hypertension    • Exposure to medical diagnostic radiation 2021   • H/O hemorrhoidectomy 1983   • High b use: No        Alcohol/week: 0.0 standard drinks      Drug use: Not Currently    Goals of care counseling/advance care planning discussion:   We discussed patient's current clinical condition.  I provided education about the typical disease trajectory of me venlafaxine 37.5 MG Oral Capsule SR 24 Hr Take 1 capsule (37.5 mg total) by mouth daily. 30 capsule 3   • Senna-Docusate Sodium 8.6-50 MG Oral Tab Take 2 tablets by mouth 3 (three) times daily.  180 tablet 3   • Naloxone HCl (NARCAN) 4 MG/0.1ML Nasal Liquid Discoloration on B feet and BLE   Neurological: Positive for tingling (BLE- better since surgery on 3/3/21 and 3/6/21; on Gabapentin) and weakness (Improving since surgery and rehab stay). Negative for dizziness and headaches.    Psychiatric/Behavior extending down to sacrum.  Incision is healing well, C/D/I, no redness, no openings, no drainage    Steri strips removed today    Red, non-blanchable area x 2 noted on sacrum; Mupirocin applied to site BID    +hemosiderin staining noted to B dorsal aspects MG Oral Tab; Take 1 tablet (30 mg total) by mouth nightly. Dispense: 30 tablet; Refill: 1    6. Depression, unspecified depression type    7. Anxiety    8. Palliative care encounter    9. Goals of care, counseling/discussion    10.  Advance care planning to administer Narcan if:   • patient unresponsive  • if breathing slows or stops/lips   • fingernails turn gray or blue  • pale or clammy skin  -Advised pt/family that 911 should be called immediately after administering Narcan  -Discussed and explained me care.    Thank you for allowing the Palliative Care Team to participate in the care of your patient. I will continue to follow clinically.     JULIANA Borges [facility]  [phone]  9/29/2021    Encounter Times  PreCharting:   minutes    Reviewing/Obtai

## 2021-09-29 NOTE — PATIENT INSTRUCTIONS
Recent Results (from the past 24 hour(s))   COMP METABOLIC PANEL (14)    Collection Time: 09/29/21 11:12 AM   Result Value Ref Range    Glucose 107 (H) 70 - 99 mg/dL    Sodium 136 136 - 145 mmol/L    Potassium 4.2 3.5 - 5.1 mmol/L    Chloride 100 98 - 112

## 2021-09-29 NOTE — PROGRESS NOTES
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 10 patch 0   • morphINE 30 MG Oral Tab Take 1 tablet (30 mg total) by mouth every 4 to 6 hours as needed for Pain.  150 tablet 0   • gabapentin 100 MG Oral Cap Take daily with breakfast. Per pt. 2tabs in a.m.      • magnesium 250 MG Oral Tab Take 125 mg by mouth. 1/2 tablet daily     • Multiple Vitamins-Minerals (MULTI-VITAMIN/MINERALS) Oral Tab Take 1 tablet by mouth daily.  (Patient not taking: Reported on 9/29/2021) Vaping Use      Vaping Use: Never used    Alcohol use: No      Alcohol/week: 0.0 standard drinks    Drug use: Not Currently      Family History   Problem Relation Age of Onset   • Hypertension Mother    • Lipids Mother         hyperlipidemia   • Glaucoma lower lobe of left lung [facility])  Staging form: Lung, AJCC 8th Edition  - Clinical stage from 2/16/2021: Stage IVB (cN2, cM1c) - Signed by Phoebe Martinez MD on 2/16/2021    --Given that the patient has evidence of metastatic disease.  Recommend for biopsy to care today, and the patient's pain is markedly improved and she has been tapering off her pain medications. --Recommend to start on RANK Ligand inhibitor to decrease risk of fractures. Needs dental clearance.        3) Brain metastasis  --MRI of the brain 6.4 - 8.2 g/dL    Albumin 3.7 3.4 - 5.0 g/dL    Globulin  3.1 2.8 - 4.4 g/dL    A/G Ratio 1.2 1.0 - 2.0    FASTING No    MAGNESIUM    Collection Time: 09/29/21 11:12 AM   Result Value Ref Range    Magnesium 2.0 1.6 - 2.6 mg/dL   CBC W/ DIFFERENTIAL    Leann enhancing lesions are no longer detectable. In the right parietal lobe there is a susceptibility artifact on gradient echo T2 star sequence at site of a treated lesion which most recently showed 6 mm of rim   enhancement.   There is equivocal minimal rim e at 12:53 PM       Finalized by (CST): Su Landers MD on 9/24/2021 at 1:15 PM              PROCEDURE: PET/CT STANDARD BODY SCAN (OEG=[id number])      COMPARISON: [facility]. for [facility], PET STANDARD BODY SCAN (LSP=[id number]), 6/09/2021, 9:17 AM. 3.53 max SUV at T11. Previously seen hypermetabolic area on prior studies likely related fractures are now resolved with no discrete FDG activity noted in the osseous structures.   A non FDG avid well-defined 1.1 cm sclerotic lesion in the left posterior

## 2021-10-25 NOTE — TELEPHONE ENCOUNTER
Patients  Ricci Dejesus calling. Stated Dr Tuan Elizabeth  Wanted patient to have a Calcium Injection. PT went to Dentist  And needs to have 4 teeth extracted. Dentist is asking which calcium injection would Dr Stacey Gonzalez be using.     Please return call to Ricci Dejesus

## 2021-10-26 ENCOUNTER — HOSPITAL ENCOUNTER (OUTPATIENT)
Dept: CT IMAGING | Facility: HOSPITAL | Age: 76
Discharge: HOME | End: 2021-10-26
Attending: NEUROLOGICAL SURGERY
Payer: COMMERCIAL

## 2021-10-26 DIAGNOSIS — S32.018A OTHER FRACTURE OF FIRST LUMBAR VERTEBRA, INITIAL ENCOUNTER FOR CLOSED FRACTURE (HCC): ICD-10-CM

## 2021-10-26 PROCEDURE — 72131 CT LUMBAR SPINE W/O DYE: CPT | Performed by: NEUROLOGICAL SURGERY

## 2021-10-26 PROCEDURE — 72128 CT CHEST SPINE W/O DYE: CPT | Performed by: NEUROLOGICAL SURGERY

## 2021-10-27 ENCOUNTER — TELEPHONE (OUTPATIENT)
Dept: HEMATOLOGY/ONCOLOGY | Facility: HOSPITAL | Age: 76
End: 2021-10-27

## 2021-10-27 NOTE — TELEPHONE ENCOUNTER
Ponce Al (patient's ) called to [facility] about his wife's pain issues. Please call Ponce Al at [phone].

## 2021-10-27 NOTE — TELEPHONE ENCOUNTER
I spoke with Griselda Corral and Jennifer Kraft via telephone today. Griselda Corral wanted to remind me that Jennifer Kraft needs Fentanyl and MS IR refilled tomorrow- I will send ERxs tomorrow to [facility] in [place].      Dg Almeida wants Dr. Yrn Barker to know:  -Jennifer Kraft had CT scans done of

## 2021-10-28 ENCOUNTER — TELEPHONE (OUTPATIENT)
Dept: HEMATOLOGY/ONCOLOGY | Facility: HOSPITAL | Age: 76
End: 2021-10-28

## 2021-10-28 DIAGNOSIS — C34.32 MALIGNANT NEOPLASM OF LOWER LOBE OF LEFT LUNG (HCC): Primary | ICD-10-CM

## 2021-10-28 DIAGNOSIS — G47.9 DIFFICULTY SLEEPING: ICD-10-CM

## 2021-10-28 DIAGNOSIS — C79.51 METASTASIS TO VERTEBRAL COLUMN OF UNKNOWN ORIGIN (HCC): ICD-10-CM

## 2021-10-28 DIAGNOSIS — R93.89 ABNORMAL CT SCAN: ICD-10-CM

## 2021-10-28 DIAGNOSIS — G89.3 CANCER RELATED PAIN: ICD-10-CM

## 2021-10-28 DIAGNOSIS — F41.9 ANXIETY: ICD-10-CM

## 2021-10-28 DIAGNOSIS — F40.240 CLAUSTROPHOBIA: ICD-10-CM

## 2021-10-28 DIAGNOSIS — R91.1 LUNG NODULE: ICD-10-CM

## 2021-10-28 DIAGNOSIS — C80.1 METASTASIS TO VERTEBRAL COLUMN OF UNKNOWN ORIGIN (HCC): ICD-10-CM

## 2021-10-28 RX ORDER — FENTANYL 75 UG/H
1 PATCH TRANSDERMAL
Qty: 10 PATCH | Refills: 0 | Status: SHIPPED | OUTPATIENT
Start: 2021-10-28 | End: 2021-11-29

## 2021-10-28 RX ORDER — MORPHINE SULFATE 30 MG/1
30 TABLET ORAL
Qty: 150 TABLET | Refills: 0 | Status: SHIPPED | OUTPATIENT
Start: 2021-10-28 | End: 2021-11-29

## 2021-10-28 RX ORDER — ALPRAZOLAM 0.25 MG/1
0.25 TABLET ORAL 2 TIMES DAILY PRN
Qty: 2 TABLET | Refills: 0 | Status: SHIPPED | OUTPATIENT
Start: 2021-10-28 | End: 2021-11-29 | Stop reason: ALTCHOICE

## 2021-10-28 RX ORDER — MIRTAZAPINE 30 MG/1
30 TABLET, FILM COATED ORAL NIGHTLY
Qty: 30 TABLET | Refills: 1 | Status: SHIPPED | OUTPATIENT
Start: 2021-10-28 | End: 2021-11-29

## 2021-10-28 NOTE — TELEPHONE ENCOUNTER
Returned phone call. Discussed will hold off starting medication for bones till after dental extractions complete. Per spouse holding off appointment with oral surgeon till Dr. Jennifer Ventura reviews scans ordered by Dr. Stacy Zendejas.  Spouse concerned patient has disease pro

## 2021-10-28 NOTE — TELEPHONE ENCOUNTER
Returned phone call. Beba Gudino notified that Dr. Lisseth Tesfaye reviewed CT scans and ordering a PET scan to compare previous PET scan. Spouse will call to schedule.

## 2021-11-03 ENCOUNTER — TELEPHONE (OUTPATIENT)
Dept: HEMATOLOGY/ONCOLOGY | Facility: HOSPITAL | Age: 76
End: 2021-11-03

## 2021-11-03 RX ORDER — OSIMERTINIB 80 1/1
2 TABLET, FILM COATED ORAL DAILY
Qty: 60 TABLET | Refills: 11 | Status: SHIPPED | OUTPATIENT
Start: 2021-11-03

## 2021-11-03 NOTE — TELEPHONE ENCOUNTER
[facility] tagrisso prescription. It needs to be rewritten due quantities are administered to the patient. Please call [phone].   Thank you

## 2021-11-05 ENCOUNTER — HOSPITAL ENCOUNTER (OUTPATIENT)
Dept: NUCLEAR MEDICINE | Facility: HOSPITAL | Age: 76
Discharge: HOME | End: 2021-11-05
Attending: INTERNAL MEDICINE
Payer: COMMERCIAL

## 2021-11-05 DIAGNOSIS — C34.32 MALIGNANT NEOPLASM OF LOWER LOBE OF LEFT LUNG (HCC): ICD-10-CM

## 2021-11-05 DIAGNOSIS — R93.89 ABNORMAL CT SCAN: ICD-10-CM

## 2021-11-05 DIAGNOSIS — R91.1 LUNG NODULE: ICD-10-CM

## 2021-11-05 PROCEDURE — 78815 PET IMAGE W/CT SKULL-THIGH: CPT | Performed by: INTERNAL MEDICINE

## 2021-11-05 PROCEDURE — 82962 GLUCOSE BLOOD TEST: CPT

## 2021-11-29 ENCOUNTER — OFFICE VISIT (OUTPATIENT)
Dept: HEMATOLOGY/ONCOLOGY | Facility: HOSPITAL | Age: 76
End: 2021-11-29
Attending: INTERNAL MEDICINE
Payer: COMMERCIAL

## 2021-11-29 ENCOUNTER — TELEPHONE (OUTPATIENT)
Dept: HEMATOLOGY/ONCOLOGY | Facility: HOSPITAL | Age: 76
End: 2021-11-29

## 2021-11-29 VITALS
BODY MASS INDEX: 21.99 KG/M2 | HEART RATE: 67 BPM | SYSTOLIC BLOOD PRESSURE: 130 MMHG | HEIGHT: 60 IN | DIASTOLIC BLOOD PRESSURE: 47 MMHG | OXYGEN SATURATION: 99 % | TEMPERATURE: 99 F | WEIGHT: 112 LBS | RESPIRATION RATE: 18 BRPM

## 2021-11-29 VITALS
WEIGHT: 112 LBS | SYSTOLIC BLOOD PRESSURE: 130 MMHG | TEMPERATURE: 99 F | HEIGHT: 60 IN | RESPIRATION RATE: 18 BRPM | DIASTOLIC BLOOD PRESSURE: 47 MMHG | HEART RATE: 67 BPM | BODY MASS INDEX: 21.99 KG/M2 | OXYGEN SATURATION: 99 %

## 2021-11-29 DIAGNOSIS — T40.2X5A THERAPEUTIC OPIOID INDUCED CONSTIPATION: ICD-10-CM

## 2021-11-29 DIAGNOSIS — Z71.89 ADVANCE CARE PLANNING: ICD-10-CM

## 2021-11-29 DIAGNOSIS — C34.90 PRIMARY MALIGNANT NEOPLASM OF LUNG METASTATIC TO OTHER SITE, UNSPECIFIED LATERALITY (HCC): Primary | ICD-10-CM

## 2021-11-29 DIAGNOSIS — C34.90 PRIMARY MALIGNANT NEOPLASM OF LUNG METASTATIC TO OTHER SITE (HCC): ICD-10-CM

## 2021-11-29 DIAGNOSIS — C80.1 METASTASIS TO VERTEBRAL COLUMN OF UNKNOWN ORIGIN (HCC): ICD-10-CM

## 2021-11-29 DIAGNOSIS — Z51.81 ENCOUNTER FOR MEDICATION MONITORING: ICD-10-CM

## 2021-11-29 DIAGNOSIS — K59.03 THERAPEUTIC OPIOID INDUCED CONSTIPATION: ICD-10-CM

## 2021-11-29 DIAGNOSIS — F41.9 ANXIETY: ICD-10-CM

## 2021-11-29 DIAGNOSIS — T45.1X5A CINV (CHEMOTHERAPY-INDUCED NAUSEA AND VOMITING): ICD-10-CM

## 2021-11-29 DIAGNOSIS — C79.31 BRAIN METASTASIS (HCC): ICD-10-CM

## 2021-11-29 DIAGNOSIS — Z71.89 GOALS OF CARE, COUNSELING/DISCUSSION: ICD-10-CM

## 2021-11-29 DIAGNOSIS — F11.90 CHRONIC, CONTINUOUS USE OF OPIOIDS: ICD-10-CM

## 2021-11-29 DIAGNOSIS — C79.51 BONE METASTASIS (HCC): ICD-10-CM

## 2021-11-29 DIAGNOSIS — C34.32 MALIGNANT NEOPLASM OF LOWER LOBE OF LEFT LUNG (HCC): ICD-10-CM

## 2021-11-29 DIAGNOSIS — F32.A DEPRESSION, UNSPECIFIED DEPRESSION TYPE: ICD-10-CM

## 2021-11-29 DIAGNOSIS — G47.9 DIFFICULTY SLEEPING: ICD-10-CM

## 2021-11-29 DIAGNOSIS — R11.2 CINV (CHEMOTHERAPY-INDUCED NAUSEA AND VOMITING): ICD-10-CM

## 2021-11-29 DIAGNOSIS — G89.3 CANCER RELATED PAIN: Primary | ICD-10-CM

## 2021-11-29 DIAGNOSIS — M62.838 MUSCLE SPASM: ICD-10-CM

## 2021-11-29 DIAGNOSIS — C79.51 METASTASIS TO VERTEBRAL COLUMN OF UNKNOWN ORIGIN (HCC): ICD-10-CM

## 2021-11-29 DIAGNOSIS — Z51.5 PALLIATIVE CARE ENCOUNTER: ICD-10-CM

## 2021-11-29 DIAGNOSIS — Z51.81 MEDICATION MONITORING ENCOUNTER: ICD-10-CM

## 2021-11-29 PROCEDURE — 99215 OFFICE O/P EST HI 40 MIN: CPT | Performed by: NURSE PRACTITIONER

## 2021-11-29 PROCEDURE — 99417 PROLNG OP E/M EACH 15 MIN: CPT | Performed by: NURSE PRACTITIONER

## 2021-11-29 PROCEDURE — 85025 COMPLETE CBC W/AUTO DIFF WBC: CPT

## 2021-11-29 PROCEDURE — 83735 ASSAY OF MAGNESIUM: CPT

## 2021-11-29 PROCEDURE — 36415 COLL VENOUS BLD VENIPUNCTURE: CPT

## 2021-11-29 PROCEDURE — 80053 COMPREHEN METABOLIC PANEL: CPT

## 2021-11-29 RX ORDER — MIRTAZAPINE 30 MG/1
30 TABLET, FILM COATED ORAL NIGHTLY
Qty: 30 TABLET | Refills: 1 | Status: SHIPPED | OUTPATIENT
Start: 2021-11-29 | End: 2022-02-02

## 2021-11-29 RX ORDER — CYCLOBENZAPRINE HCL 5 MG
2.5 TABLET ORAL EVERY 6 HOURS PRN
Qty: 30 TABLET | Refills: 0 | Status: SHIPPED | OUTPATIENT
Start: 2021-11-29

## 2021-11-29 RX ORDER — ONDANSETRON 4 MG/1
4 TABLET, ORALLY DISINTEGRATING ORAL EVERY 8 HOURS PRN
Qty: 90 TABLET | Refills: 2 | Status: SHIPPED | OUTPATIENT
Start: 2021-11-29 | End: 2022-02-02 | Stop reason: DRUGHIGH

## 2021-11-29 RX ORDER — FENTANYL 75 UG/H
1 PATCH TRANSDERMAL
Qty: 10 PATCH | Refills: 0 | Status: SHIPPED | OUTPATIENT
Start: 2021-11-29 | End: 2021-12-27

## 2021-11-29 RX ORDER — MORPHINE SULFATE 30 MG/1
30 TABLET ORAL
Qty: 150 TABLET | Refills: 0 | Status: SHIPPED | OUTPATIENT
Start: 2021-11-29 | End: 2022-02-02

## 2021-11-29 RX ORDER — GABAPENTIN 100 MG/1
100 CAPSULE ORAL NIGHTLY
Qty: 30 CAPSULE | Refills: 1 | Status: SHIPPED | OUTPATIENT
Start: 2021-11-29

## 2021-11-29 NOTE — PROGRESS NOTES
KIYA Wilkinson is a 68year old female here for follow up of Primary malignant neoplasm of lung metastatic to other site, unspecified laterality (hcc)  (primary encounter diagnosis)  Malignant neoplasm of lower lobe of left lung (hcc)  Metast depression. Negative for sleep disturbance. The patient is nervous/anxious. Current Outpatient Medications   Medication Sig Dispense Refill   • Osimertinib Mesylate (TAGRISSO) 80 MG Oral Tab Take 2 tablets by mouth daily.  Take 2 tabs 160 mg tota MG Oral Tab Take 2 tablets by mouth 3 (three) times daily. 180 tablet 3   • Naloxone HCl (NARCAN) 4 MG/0.1ML Nasal Liquid 4 mg by Nasal route as needed. If patient remains unresponsive, repeat dose in other nostril 2-5 minutes after first dose.  Call [phone] im [phone]   • TOTAL ABDOM HYSTERECTOMY     • YAG CAPSULOTOMY - OD - RIGHT EYE Right 6/6/15    DOMITILA   • YAG CAPSULOTOMY - OS - LEFT EYE Left 06/16/2015    DOMITILA     Social History    Tobacco Use      Smoking status: Never Smoker      Smokeless tobacco: Never Used malignant neoplasm of lung metastatic to other site, unspecified laterality (hcc)  (primary encounter diagnosis)  Malignant neoplasm of lower lobe of left lung (hcc)  Metastasis to vertebral column of unknown origin (hcc)  Bone metastasis (hcc)  Encounter Completed now d/t CT scans for spine showing possible increase in activity. PET showing stable to improved results.      2)  Pathologic compression fracture of spine, initial encounter ([facility]) with  Lumbar radiculopathy  --S/p surgical decompression and is on Time: 11/29/21  9:39 AM   Result Value Ref Range    Glucose 105 (H) 70 - 99 mg/dL    Sodium 136 136 - 145 mmol/L    Potassium 5.0 3.5 - 5.1 mmol/L    Chloride 104 98 - 112 mmol/L    CO2 30.0 21.0 - 32.0 mmol/L    Anion Gap 2 0 - 18 mmol/L    BUN 14 7 - 18 (PRC=[id number])       COMPARISON: [facility], MRI SPINE LUMBAR (ANO=[id number]), 2/01/2021, 10:13 AM. [facility], MRI SPINE LUMBAR (W+WO) (CPT=[id number]), 3/03/2021, 2:03 PM. [facility], MRI BRAIN (W+WO) (ONE=[id number]), 9/ max of the liver is 1.5. HEAD/NECK: Heterogeneous intracranial FDG activity is demonstrated; lesions seen on MR are likely too small for scintigraphic assessment or excluded from the field of view.  Limited views are notable for postoperative changes of t placement are evident.  Previously seen hypermetabolic activity at W49 has decreased. OTHER: There is a minute fat-containing umbilical hernia. Midline ventral abdominal scarring is present.  There is transversely oriented lower abdominopelvic wall scarri

## 2021-11-29 NOTE — TELEPHONE ENCOUNTER
Patients  calling, stated they saw Steve Soren today. Stated wife had labs drawn.  is requesting that in the lab order he would like to add a Vitamin D  To the results.     Please return call to [facility] BRIDGER GAXIOLA

## 2021-11-29 NOTE — TELEPHONE ENCOUNTER
Akanksha Addisno, per  [facility] no need for Vit D level as normal 2 months ago, he verbalizes understanding.

## 2021-11-29 NOTE — PROGRESS NOTES
Palliative Care Follow-Up Note     Patient Name: Ela Qureshi   YOB: 1945   Medical Record Number: [id number]   Date of visit: 11/29/2021     Chief Complaint/Reason for Visit:  Patient presents with:  Palliative Care    Past Medical pain; chronic, but has worsened since cancer diagnosis, however, stable on current med regimen  -Slight improvement in back pain since surgery with Dr. Sienna Zamudio (March, 2021 and July, 2021) and with current pain med regimen  -Pain is sharp, aching, and throbbi Encounter for medication monitoring     Bone metastasis ([facility]     Painful orthopaedic hardware [facility])     Preop testing       Medical History:  Past Medical History:   Diagnosis Date   • Back problem    • Cancer ([facility] [facility]     Lung, spine mets   • Cataract 2007 Smoking status: Never Smoker      Smokeless tobacco: Never Used    Vaping Use      Vaping Use: Never used    Substance and Sexual Activity      Alcohol use: No        Alcohol/week: 0.0 standard drinks      Drug use: Not Currently    Goals of care counselin MOUTH EVERY DAY IN THE EVENING 90 tablet 4   • docusate sodium 100 MG Oral Cap Take 100 mg by mouth 2 (two) times daily. 60 capsule 2   • Senna-Docusate Sodium 8.6-50 MG Oral Tab Take 2 tablets by mouth 3 (three) times daily.  180 tablet 3   • Naloxone HCl surgery on 3/3/21 and 3/6/21; on Gabapentin) and weakness (Improving since surgery and rehab stay). Negative for dizziness and headaches. Psychiatric/Behavioral: Positive for depression.  Negative for hallucinations, memory loss, substance abuse and suici fingernails noted     Neurological:      Mental Status: She is alert and oriented to person, place, and time. Mental status is at baseline. Motor: No weakness.       Gait: Gait normal.   Psychiatric:         Mood and Affect: Mood normal.         [place] Standard sleeping  - mirtazapine 30 MG Oral Tab; Take 1 tablet (30 mg total) by mouth nightly. Dispense: 30 tablet; Refill: 1    7. Depression, unspecified depression type    8. Anxiety    9. Palliative care encounter    10.  Goals of care, counseling/discussion administer Narcan if:   • patient unresponsive  • if breathing slows or stops/lips   • fingernails turn gray or blue  • pale or clammy skin  -Advised pt/family that 911 should be called immediately after administering Narcan  -Discussed and explained sherri Palliative Care Team to participate in the care of your patient. I will continue to follow clinically.     JULIANA Blanton [facility]  [phone]  2021    Encounter Times  PreCharting:   minutes    Reviewing/Obtainin minutes      Medical Exam:

## 2021-12-27 DIAGNOSIS — G89.3 CANCER RELATED PAIN: ICD-10-CM

## 2021-12-27 DIAGNOSIS — C79.51 METASTASIS TO VERTEBRAL COLUMN OF UNKNOWN ORIGIN (HCC): ICD-10-CM

## 2021-12-27 DIAGNOSIS — C80.1 METASTASIS TO VERTEBRAL COLUMN OF UNKNOWN ORIGIN (HCC): ICD-10-CM

## 2021-12-27 RX ORDER — FENTANYL 75 UG/H
1 PATCH TRANSDERMAL
Qty: 10 PATCH | Refills: 0 | Status: SHIPPED | OUTPATIENT
Start: 2021-12-27 | End: 2022-01-27

## 2021-12-28 NOTE — TELEPHONE ENCOUNTER
Called spouse and notified [facility] [place] can be refilled 1/3/22. Per [facility] request for delivery was too early. Spouse will call for delivery on 1/3/22.

## 2022-01-01 ENCOUNTER — APPOINTMENT (OUTPATIENT)
Dept: GENERAL RADIOLOGY | Facility: HOSPITAL | Age: 77
End: 2022-01-01
Attending: INTERNAL MEDICINE
Payer: MEDICARE

## 2022-01-01 ENCOUNTER — APPOINTMENT (OUTPATIENT)
Dept: ULTRASOUND IMAGING | Facility: HOSPITAL | Age: 77
End: 2022-01-01
Attending: EMERGENCY MEDICINE
Payer: MEDICARE

## 2022-01-01 ENCOUNTER — HOSPITAL ENCOUNTER (INPATIENT)
Facility: HOSPITAL | Age: 77
LOS: 5 days | End: 2022-01-01
Attending: HOSPITALIST | Admitting: HOSPITALIST
Payer: OTHER MISCELLANEOUS

## 2022-01-01 ENCOUNTER — APPOINTMENT (OUTPATIENT)
Dept: MRI IMAGING | Facility: HOSPITAL | Age: 77
End: 2022-01-01
Attending: INTERNAL MEDICINE
Payer: MEDICARE

## 2022-01-01 ENCOUNTER — APPOINTMENT (OUTPATIENT)
Dept: CT IMAGING | Facility: HOSPITAL | Age: 77
End: 2022-01-01
Attending: EMERGENCY MEDICINE
Payer: MEDICARE

## 2022-01-01 ENCOUNTER — APPOINTMENT (OUTPATIENT)
Dept: HEMATOLOGY/ONCOLOGY | Facility: HOSPITAL | Age: 77
End: 2022-01-01
Attending: INTERNAL MEDICINE
Payer: COMMERCIAL

## 2022-01-01 ENCOUNTER — APPOINTMENT (OUTPATIENT)
Dept: HEMATOLOGY/ONCOLOGY | Facility: HOSPITAL | Age: 77
End: 2022-01-01
Attending: NURSE PRACTITIONER
Payer: COMMERCIAL

## 2022-01-01 ENCOUNTER — HOSPITAL ENCOUNTER (INPATIENT)
Facility: HOSPITAL | Age: 77
LOS: 8 days | Discharge: HOSPICE | End: 2022-01-01
Attending: EMERGENCY MEDICINE | Admitting: HOSPITALIST
Payer: MEDICARE

## 2022-01-01 ENCOUNTER — APPOINTMENT (OUTPATIENT)
Dept: GENERAL RADIOLOGY | Facility: HOSPITAL | Age: 77
End: 2022-01-01
Attending: HOSPITALIST
Payer: MEDICARE

## 2022-01-01 VITALS
TEMPERATURE: 99 F | HEART RATE: 94 BPM | WEIGHT: 106 LBS | RESPIRATION RATE: 22 BRPM | BODY MASS INDEX: 20.81 KG/M2 | SYSTOLIC BLOOD PRESSURE: 156 MMHG | DIASTOLIC BLOOD PRESSURE: 87 MMHG | HEIGHT: 60 IN | OXYGEN SATURATION: 92 %

## 2022-01-01 VITALS
SYSTOLIC BLOOD PRESSURE: 66 MMHG | HEART RATE: 119 BPM | RESPIRATION RATE: 12 BRPM | DIASTOLIC BLOOD PRESSURE: 36 MMHG | TEMPERATURE: 102 F | OXYGEN SATURATION: 84 %

## 2022-01-01 DIAGNOSIS — M54.9 INTRACTABLE BACK PAIN: Primary | ICD-10-CM

## 2022-01-01 DIAGNOSIS — C79.51 BONE METASTASIS (HCC): ICD-10-CM

## 2022-01-01 DIAGNOSIS — M54.9 INTRACTABLE BACK PAIN: ICD-10-CM

## 2022-01-01 DIAGNOSIS — K59.03 THERAPEUTIC OPIOID-INDUCED CONSTIPATION (OIC): ICD-10-CM

## 2022-01-01 DIAGNOSIS — R23.8 SKIN BREAKDOWN: ICD-10-CM

## 2022-01-01 DIAGNOSIS — T40.2X5A THERAPEUTIC OPIOID-INDUCED CONSTIPATION (OIC): ICD-10-CM

## 2022-01-01 DIAGNOSIS — T40.2X5A THERAPEUTIC OPIOID INDUCED CONSTIPATION: ICD-10-CM

## 2022-01-01 DIAGNOSIS — K92.1 MELENA: ICD-10-CM

## 2022-01-01 DIAGNOSIS — K59.03 THERAPEUTIC OPIOID INDUCED CONSTIPATION: ICD-10-CM

## 2022-01-01 DIAGNOSIS — F41.9 ANXIETY IN CANCER PATIENT: ICD-10-CM

## 2022-01-01 DIAGNOSIS — C79.31 BRAIN METASTASIS (HCC): ICD-10-CM

## 2022-01-01 LAB
ALBUMIN SERPL-MCNC: 2.1 G/DL (ref 3.4–5)
ALBUMIN SERPL-MCNC: 2.3 G/DL (ref 3.4–5)
ALBUMIN SERPL-MCNC: 2.3 G/DL (ref 3.4–5)
ALBUMIN SERPL-MCNC: 2.4 G/DL (ref 3.4–5)
ALBUMIN SERPL-MCNC: 2.4 G/DL (ref 3.4–5)
ANION GAP SERPL CALC-SCNC: 4 MMOL/L (ref 0–18)
ANION GAP SERPL CALC-SCNC: 4 MMOL/L (ref 0–18)
ANION GAP SERPL CALC-SCNC: 5 MMOL/L (ref 0–18)
ANION GAP SERPL CALC-SCNC: 6 MMOL/L (ref 0–18)
ANION GAP SERPL CALC-SCNC: 6 MMOL/L (ref 0–18)
ANION GAP SERPL CALC-SCNC: 7 MMOL/L (ref 0–18)
ANION GAP SERPL CALC-SCNC: 7 MMOL/L (ref 0–18)
ANION GAP SERPL CALC-SCNC: 8 MMOL/L (ref 0–18)
ANION GAP SERPL CALC-SCNC: 9 MMOL/L (ref 0–18)
BASOPHILS # BLD AUTO: 0.03 X10(3) UL (ref 0–0.2)
BASOPHILS # BLD AUTO: 0.04 X10(3) UL (ref 0–0.2)
BASOPHILS # BLD AUTO: 0.04 X10(3) UL (ref 0–0.2)
BASOPHILS # BLD AUTO: 0.05 X10(3) UL (ref 0–0.2)
BASOPHILS # BLD: 0 X10(3) UL (ref 0–0.2)
BASOPHILS # BLD: 0.31 X10(3) UL (ref 0–0.2)
BASOPHILS NFR BLD AUTO: 0.2 %
BASOPHILS NFR BLD: 0 %
BASOPHILS NFR BLD: 1 %
BILIRUB UR QL: NEGATIVE
BUN BLD-MCNC: 11 MG/DL (ref 7–18)
BUN BLD-MCNC: 5 MG/DL (ref 7–18)
BUN BLD-MCNC: 6 MG/DL (ref 7–18)
BUN BLD-MCNC: 6 MG/DL (ref 7–18)
BUN BLD-MCNC: 7 MG/DL (ref 7–18)
BUN BLD-MCNC: 7 MG/DL (ref 7–18)
BUN BLD-MCNC: 8 MG/DL (ref 7–18)
BUN BLD-MCNC: 9 MG/DL (ref 7–18)
BUN BLD-MCNC: 9 MG/DL (ref 7–18)
BUN/CREAT SERPL: 15.4 (ref 10–20)
BUN/CREAT SERPL: 18.9 (ref 10–20)
BUN/CREAT SERPL: 18.9 (ref 10–20)
BUN/CREAT SERPL: 19.2 (ref 10–20)
BUN/CREAT SERPL: 20.7 (ref 10–20)
BUN/CREAT SERPL: 20.9 (ref 10–20)
BUN/CREAT SERPL: 22.2 (ref 10–20)
BUN/CREAT SERPL: 25 (ref 10–20)
BUN/CREAT SERPL: 27.5 (ref 10–20)
CALCIUM BLD-MCNC: 7.2 MG/DL (ref 8.5–10.1)
CALCIUM BLD-MCNC: 7.4 MG/DL (ref 8.5–10.1)
CALCIUM BLD-MCNC: 7.5 MG/DL (ref 8.5–10.1)
CALCIUM BLD-MCNC: 7.7 MG/DL (ref 8.5–10.1)
CALCIUM BLD-MCNC: 7.9 MG/DL (ref 8.5–10.1)
CALCIUM BLD-MCNC: 8 MG/DL (ref 8.5–10.1)
CALCIUM BLD-MCNC: 8.1 MG/DL (ref 8.5–10.1)
CALCIUM BLD-MCNC: 8.2 MG/DL (ref 8.5–10.1)
CALCIUM BLD-MCNC: 8.2 MG/DL (ref 8.5–10.1)
CHLORIDE SERPL-SCNC: 91 MMOL/L (ref 98–112)
CHLORIDE SERPL-SCNC: 93 MMOL/L (ref 98–112)
CHLORIDE SERPL-SCNC: 95 MMOL/L (ref 98–112)
CHLORIDE SERPL-SCNC: 96 MMOL/L (ref 98–112)
CLARITY UR: CLEAR
CLARITY UR: CLEAR
CO2 SERPL-SCNC: 25 MMOL/L (ref 21–32)
CO2 SERPL-SCNC: 25 MMOL/L (ref 21–32)
CO2 SERPL-SCNC: 27 MMOL/L (ref 21–32)
CO2 SERPL-SCNC: 28 MMOL/L (ref 21–32)
COLOR UR: YELLOW
CREAT BLD-MCNC: 0.26 MG/DL
CREAT BLD-MCNC: 0.29 MG/DL
CREAT BLD-MCNC: 0.36 MG/DL
CREAT BLD-MCNC: 0.36 MG/DL
CREAT BLD-MCNC: 0.37 MG/DL
CREAT BLD-MCNC: 0.37 MG/DL
CREAT BLD-MCNC: 0.39 MG/DL
CREAT BLD-MCNC: 0.4 MG/DL
CREAT BLD-MCNC: 0.43 MG/DL
DEPRECATED RDW RBC AUTO: 50.4 FL (ref 35.1–46.3)
DEPRECATED RDW RBC AUTO: 52.6 FL (ref 35.1–46.3)
DEPRECATED RDW RBC AUTO: 52.8 FL (ref 35.1–46.3)
DEPRECATED RDW RBC AUTO: 53.1 FL (ref 35.1–46.3)
DEPRECATED RDW RBC AUTO: 54.2 FL (ref 35.1–46.3)
DEPRECATED RDW RBC AUTO: 54.4 FL (ref 35.1–46.3)
DEPRECATED RDW RBC AUTO: 54.6 FL (ref 35.1–46.3)
DEPRECATED RDW RBC AUTO: 55.9 FL (ref 35.1–46.3)
DEPRECATED RDW RBC AUTO: 56 FL (ref 35.1–46.3)
EOSINOPHIL # BLD AUTO: 0.01 X10(3) UL (ref 0–0.7)
EOSINOPHIL # BLD AUTO: 0.01 X10(3) UL (ref 0–0.7)
EOSINOPHIL # BLD AUTO: 0.03 X10(3) UL (ref 0–0.7)
EOSINOPHIL # BLD AUTO: 0.16 X10(3) UL (ref 0–0.7)
EOSINOPHIL # BLD: 0.61 X10(3) UL (ref 0–0.7)
EOSINOPHIL # BLD: 0.69 X10(3) UL (ref 0–0.7)
EOSINOPHIL NFR BLD AUTO: 0 %
EOSINOPHIL NFR BLD AUTO: 0.1 %
EOSINOPHIL NFR BLD AUTO: 0.2 %
EOSINOPHIL NFR BLD AUTO: 0.7 %
EOSINOPHIL NFR BLD: 2 %
EOSINOPHIL NFR BLD: 2 %
ERYTHROCYTE [DISTWIDTH] IN BLOOD BY AUTOMATED COUNT: 16 % (ref 11–15)
ERYTHROCYTE [DISTWIDTH] IN BLOOD BY AUTOMATED COUNT: 16.4 % (ref 11–15)
ERYTHROCYTE [DISTWIDTH] IN BLOOD BY AUTOMATED COUNT: 16.5 % (ref 11–15)
ERYTHROCYTE [DISTWIDTH] IN BLOOD BY AUTOMATED COUNT: 16.6 % (ref 11–15)
ERYTHROCYTE [DISTWIDTH] IN BLOOD BY AUTOMATED COUNT: 16.7 % (ref 11–15)
ERYTHROCYTE [DISTWIDTH] IN BLOOD BY AUTOMATED COUNT: 16.8 % (ref 11–15)
ERYTHROCYTE [DISTWIDTH] IN BLOOD BY AUTOMATED COUNT: 17 % (ref 11–15)
ERYTHROCYTE [DISTWIDTH] IN BLOOD BY AUTOMATED COUNT: 17 % (ref 11–15)
ERYTHROCYTE [DISTWIDTH] IN BLOOD BY AUTOMATED COUNT: 17.2 % (ref 11–15)
GLUCOSE BLD-MCNC: 103 MG/DL (ref 70–99)
GLUCOSE BLD-MCNC: 104 MG/DL (ref 70–99)
GLUCOSE BLD-MCNC: 107 MG/DL (ref 70–99)
GLUCOSE BLD-MCNC: 107 MG/DL (ref 70–99)
GLUCOSE BLD-MCNC: 109 MG/DL (ref 70–99)
GLUCOSE BLD-MCNC: 114 MG/DL (ref 70–99)
GLUCOSE BLD-MCNC: 114 MG/DL (ref 70–99)
GLUCOSE BLD-MCNC: 120 MG/DL (ref 70–99)
GLUCOSE BLD-MCNC: 141 MG/DL (ref 70–99)
GLUCOSE UR-MCNC: 50 MG/DL
GLUCOSE UR-MCNC: NEGATIVE MG/DL
GLUCOSE UR-MCNC: NEGATIVE MG/DL
HCT VFR BLD AUTO: 25.2 %
HCT VFR BLD AUTO: 26.1 %
HCT VFR BLD AUTO: 26.4 %
HCT VFR BLD AUTO: 26.5 %
HCT VFR BLD AUTO: 27.4 %
HCT VFR BLD AUTO: 28 %
HCT VFR BLD AUTO: 28 %
HCT VFR BLD AUTO: 28.6 %
HCT VFR BLD AUTO: 28.9 %
HGB BLD-MCNC: 8.3 G/DL
HGB BLD-MCNC: 8.5 G/DL
HGB BLD-MCNC: 8.5 G/DL
HGB BLD-MCNC: 8.7 G/DL
HGB BLD-MCNC: 9.1 G/DL
HGB BLD-MCNC: 9.1 G/DL
HGB BLD-MCNC: 9.2 G/DL
HGB BLD-MCNC: 9.3 G/DL
HGB BLD-MCNC: 9.4 G/DL
HGB UR QL STRIP.AUTO: NEGATIVE
HGB UR QL STRIP.AUTO: NEGATIVE
IMM GRANULOCYTES # BLD AUTO: 0.13 X10(3) UL (ref 0–1)
IMM GRANULOCYTES # BLD AUTO: 0.18 X10(3) UL (ref 0–1)
IMM GRANULOCYTES # BLD AUTO: 0.18 X10(3) UL (ref 0–1)
IMM GRANULOCYTES # BLD AUTO: 0.32 X10(3) UL (ref 0–1)
IMM GRANULOCYTES NFR BLD: 0.7 %
IMM GRANULOCYTES NFR BLD: 0.8 %
IMM GRANULOCYTES NFR BLD: 1 %
IMM GRANULOCYTES NFR BLD: 1.4 %
KETONES UR-MCNC: NEGATIVE MG/DL
KETONES UR-MCNC: NEGATIVE MG/DL
LEUKOCYTE ESTERASE UR QL STRIP.AUTO: NEGATIVE
LEUKOCYTE ESTERASE UR QL STRIP.AUTO: NEGATIVE
LYMPHOCYTES # BLD AUTO: 0.3 X10(3) UL (ref 1–4)
LYMPHOCYTES # BLD AUTO: 0.43 X10(3) UL (ref 1–4)
LYMPHOCYTES # BLD AUTO: 0.51 X10(3) UL (ref 1–4)
LYMPHOCYTES # BLD AUTO: 0.63 X10(3) UL (ref 1–4)
LYMPHOCYTES NFR BLD AUTO: 1.6 %
LYMPHOCYTES NFR BLD AUTO: 1.9 %
LYMPHOCYTES NFR BLD AUTO: 2.7 %
LYMPHOCYTES NFR BLD AUTO: 2.8 %
LYMPHOCYTES NFR BLD: 1.04 X10(3) UL (ref 1–4)
LYMPHOCYTES NFR BLD: 1.54 X10(3) UL (ref 1–4)
LYMPHOCYTES NFR BLD: 3 %
LYMPHOCYTES NFR BLD: 5 %
MAGNESIUM SERPL-MCNC: 2 MG/DL (ref 1.6–2.6)
MAGNESIUM SERPL-MCNC: 2.3 MG/DL (ref 1.6–2.6)
MAGNESIUM SERPL-MCNC: 2.6 MG/DL (ref 1.6–2.6)
MCH RBC QN AUTO: 28.2 PG (ref 26–34)
MCH RBC QN AUTO: 28.3 PG (ref 26–34)
MCH RBC QN AUTO: 28.4 PG (ref 26–34)
MCH RBC QN AUTO: 28.6 PG (ref 26–34)
MCH RBC QN AUTO: 28.7 PG (ref 26–34)
MCH RBC QN AUTO: 28.8 PG (ref 26–34)
MCH RBC QN AUTO: 28.9 PG (ref 26–34)
MCH RBC QN AUTO: 28.9 PG (ref 26–34)
MCH RBC QN AUTO: 29.1 PG (ref 26–34)
MCHC RBC AUTO-ENTMCNC: 32.1 G/DL (ref 31–37)
MCHC RBC AUTO-ENTMCNC: 32.2 G/DL (ref 31–37)
MCHC RBC AUTO-ENTMCNC: 32.2 G/DL (ref 31–37)
MCHC RBC AUTO-ENTMCNC: 32.5 G/DL (ref 31–37)
MCHC RBC AUTO-ENTMCNC: 32.9 G/DL (ref 31–37)
MCHC RBC AUTO-ENTMCNC: 33.3 G/DL (ref 31–37)
MCHC RBC AUTO-ENTMCNC: 33.9 G/DL (ref 31–37)
MCV RBC AUTO: 85.1 FL
MCV RBC AUTO: 86.8 FL
MCV RBC AUTO: 87.2 FL
MCV RBC AUTO: 87.3 FL
MCV RBC AUTO: 87.5 FL
MCV RBC AUTO: 88.3 FL
MCV RBC AUTO: 88.3 FL
MCV RBC AUTO: 88.9 FL
MCV RBC AUTO: 90.1 FL
MONOCYTES # BLD AUTO: 0.88 X10(3) UL (ref 0.1–1)
MONOCYTES # BLD AUTO: 1.23 X10(3) UL (ref 0.1–1)
MONOCYTES # BLD AUTO: 1.24 X10(3) UL (ref 0.1–1)
MONOCYTES # BLD AUTO: 1.68 X10(3) UL (ref 0.1–1)
MONOCYTES # BLD: 0.35 X10(3) UL (ref 0.1–1)
MONOCYTES # BLD: 1.54 X10(3) UL (ref 0.1–1)
MONOCYTES NFR BLD AUTO: 3.8 %
MONOCYTES NFR BLD AUTO: 6.7 %
MONOCYTES NFR BLD AUTO: 6.7 %
MONOCYTES NFR BLD AUTO: 7.1 %
MONOCYTES NFR BLD: 1 %
MONOCYTES NFR BLD: 5 %
NEUTROPHILS # BLD AUTO: 16.41 X10 (3) UL (ref 1.5–7.7)
NEUTROPHILS # BLD AUTO: 16.41 X10(3) UL (ref 1.5–7.7)
NEUTROPHILS # BLD AUTO: 16.57 X10 (3) UL (ref 1.5–7.7)
NEUTROPHILS # BLD AUTO: 16.57 X10(3) UL (ref 1.5–7.7)
NEUTROPHILS # BLD AUTO: 20.75 X10 (3) UL (ref 1.5–7.7)
NEUTROPHILS # BLD AUTO: 20.75 X10(3) UL (ref 1.5–7.7)
NEUTROPHILS # BLD AUTO: 21.38 X10 (3) UL (ref 1.5–7.7)
NEUTROPHILS # BLD AUTO: 21.38 X10(3) UL (ref 1.5–7.7)
NEUTROPHILS # BLD AUTO: 27.47 X10 (3) UL (ref 1.5–7.7)
NEUTROPHILS # BLD AUTO: 31.76 X10 (3) UL (ref 1.5–7.7)
NEUTROPHILS NFR BLD AUTO: 87.9 %
NEUTROPHILS NFR BLD AUTO: 89.2 %
NEUTROPHILS NFR BLD AUTO: 90.6 %
NEUTROPHILS NFR BLD AUTO: 93.3 %
NEUTROPHILS NFR BLD: 80 %
NEUTROPHILS NFR BLD: 80 %
NEUTS BAND NFR BLD: 14 %
NEUTS BAND NFR BLD: 7 %
NEUTS HYPERSEG # BLD: 26.71 X10(3) UL (ref 1.5–7.7)
NEUTS HYPERSEG # BLD: 32.52 X10(3) UL (ref 1.5–7.7)
NITRITE UR QL STRIP.AUTO: NEGATIVE
OSMOLALITY SERPL CALC.SUM OF ELEC: 258 MOSM/KG (ref 275–295)
OSMOLALITY SERPL CALC.SUM OF ELEC: 263 MOSM/KG (ref 275–295)
OSMOLALITY SERPL CALC.SUM OF ELEC: 264 MOSM/KG (ref 275–295)
OSMOLALITY SERPL CALC.SUM OF ELEC: 266 MOSM/KG (ref 275–295)
OSMOLALITY SERPL CALC.SUM OF ELEC: 267 MOSM/KG (ref 275–295)
OSMOLALITY SERPL CALC.SUM OF ELEC: 269 MOSM/KG (ref 275–295)
OSMOLALITY SERPL CALC.SUM OF ELEC: 270 MOSM/KG (ref 275–295)
OSMOLALITY UR: 793 MOSM/KG (ref 300–1100)
PH UR: 7 [PH] (ref 5–8)
PH UR: 7 [PH] (ref 5–8)
PH UR: 8 [PH] (ref 5–8)
PHOSPHATE SERPL-MCNC: 1.3 MG/DL (ref 2.5–4.9)
PHOSPHATE SERPL-MCNC: 1.3 MG/DL (ref 2.5–4.9)
PHOSPHATE SERPL-MCNC: 1.5 MG/DL (ref 2.5–4.9)
PHOSPHATE SERPL-MCNC: 1.7 MG/DL (ref 2.5–4.9)
PHOSPHATE SERPL-MCNC: 1.7 MG/DL (ref 2.5–4.9)
PHOSPHATE SERPL-MCNC: 1.8 MG/DL (ref 2.5–4.9)
PHOSPHATE SERPL-MCNC: 1.8 MG/DL (ref 2.5–4.9)
PHOSPHATE SERPL-MCNC: 2 MG/DL (ref 2.5–4.9)
PHOSPHATE SERPL-MCNC: 2.6 MG/DL (ref 2.5–4.9)
PLATELET # BLD AUTO: 101 10(3)UL (ref 150–450)
PLATELET # BLD AUTO: 103 10(3)UL (ref 150–450)
PLATELET # BLD AUTO: 105 10(3)UL (ref 150–450)
PLATELET # BLD AUTO: 108 10(3)UL (ref 150–450)
PLATELET # BLD AUTO: 108 10(3)UL (ref 150–450)
PLATELET # BLD AUTO: 110 10(3)UL (ref 150–450)
PLATELET # BLD AUTO: 111 10(3)UL (ref 150–450)
PLATELET # BLD AUTO: 116 10(3)UL (ref 150–450)
PLATELET # BLD AUTO: 117 10(3)UL (ref 150–450)
PLATELET MORPHOLOGY: NORMAL
PLATELET MORPHOLOGY: NORMAL
POTASSIUM SERPL-SCNC: 3 MMOL/L (ref 3.5–5.1)
POTASSIUM SERPL-SCNC: 3.6 MMOL/L (ref 3.5–5.1)
POTASSIUM SERPL-SCNC: 4 MMOL/L (ref 3.5–5.1)
POTASSIUM SERPL-SCNC: 4.1 MMOL/L (ref 3.5–5.1)
POTASSIUM SERPL-SCNC: 4.2 MMOL/L (ref 3.5–5.1)
POTASSIUM SERPL-SCNC: 4.3 MMOL/L (ref 3.5–5.1)
POTASSIUM SERPL-SCNC: 4.5 MMOL/L (ref 3.5–5.1)
PROT UR-MCNC: 30 MG/DL
PROT UR-MCNC: NEGATIVE MG/DL
PROT UR-MCNC: NEGATIVE MG/DL
RBC # BLD AUTO: 2.88 X10(6)UL
RBC # BLD AUTO: 2.94 X10(6)UL
RBC # BLD AUTO: 2.97 X10(6)UL
RBC # BLD AUTO: 2.99 X10(6)UL
RBC # BLD AUTO: 3.17 X10(6)UL
RBC # BLD AUTO: 3.21 X10(6)UL
RBC # BLD AUTO: 3.22 X10(6)UL
RBC # BLD AUTO: 3.24 X10(6)UL
RBC # BLD AUTO: 3.33 X10(6)UL
SARS-COV-2 RNA RESP QL NAA+PROBE: NOT DETECTED
SODIUM SERPL-SCNC: 125 MMOL/L (ref 136–145)
SODIUM SERPL-SCNC: 127 MMOL/L (ref 136–145)
SODIUM SERPL-SCNC: 129 MMOL/L (ref 136–145)
SODIUM SERPL-SCNC: 129 MMOL/L (ref 136–145)
SODIUM SERPL-SCNC: 130 MMOL/L (ref 136–145)
SODIUM SERPL-SCNC: 130 MMOL/L (ref 136–145)
SODIUM SERPL-SCNC: 99 MMOL/L
SP GR UR STRIP: 1.01 (ref 1–1.03)
SP GR UR STRIP: 1.02 (ref 1–1.03)
SP GR UR STRIP: 1.02 (ref 1–1.03)
TOTAL CELLS COUNTED BLD: 100
TOTAL CELLS COUNTED BLD: 100
UROBILINOGEN UR STRIP-ACNC: 0.2
UROBILINOGEN UR STRIP-ACNC: <2
UROBILINOGEN UR STRIP-ACNC: <2
VIT C UR-MCNC: NEGATIVE MG/DL
VIT C UR-MCNC: NEGATIVE MG/DL
WBC # BLD AUTO: 16.8 X10(3) UL (ref 4–11)
WBC # BLD AUTO: 18.3 X10(3) UL (ref 4–11)
WBC # BLD AUTO: 18.3 X10(3) UL (ref 4–11)
WBC # BLD AUTO: 18.4 X10(3) UL (ref 4–11)
WBC # BLD AUTO: 19.9 X10(3) UL (ref 4–11)
WBC # BLD AUTO: 22.9 X10(3) UL (ref 4–11)
WBC # BLD AUTO: 23.6 X10(3) UL (ref 4–11)
WBC # BLD AUTO: 30.7 X10(3) UL (ref 4–11)
WBC # BLD AUTO: 34.6 X10(3) UL (ref 4–11)

## 2022-01-01 PROCEDURE — 99222 1ST HOSP IP/OBS MODERATE 55: CPT | Performed by: HOSPITALIST

## 2022-01-01 PROCEDURE — 99255 IP/OBS CONSLTJ NEW/EST HI 80: CPT | Performed by: INTERNAL MEDICINE

## 2022-01-01 PROCEDURE — 99253 IP/OBS CNSLTJ NEW/EST LOW 45: CPT | Performed by: NURSE PRACTITIONER

## 2022-01-01 PROCEDURE — 99233 SBSQ HOSP IP/OBS HIGH 50: CPT | Performed by: INTERNAL MEDICINE

## 2022-01-01 PROCEDURE — 99232 SBSQ HOSP IP/OBS MODERATE 35: CPT | Performed by: HOSPITALIST

## 2022-01-01 PROCEDURE — 70553 MRI BRAIN STEM W/O & W/DYE: CPT | Performed by: INTERNAL MEDICINE

## 2022-01-01 PROCEDURE — 99223 1ST HOSP IP/OBS HIGH 75: CPT | Performed by: INTERNAL MEDICINE

## 2022-01-01 PROCEDURE — 99233 SBSQ HOSP IP/OBS HIGH 50: CPT | Performed by: HOSPITALIST

## 2022-01-01 PROCEDURE — 0DB68ZX EXCISION OF STOMACH, VIA NATURAL OR ARTIFICIAL OPENING ENDOSCOPIC, DIAGNOSTIC: ICD-10-PCS | Performed by: INTERNAL MEDICINE

## 2022-01-01 PROCEDURE — 72148 MRI LUMBAR SPINE W/O DYE: CPT | Performed by: INTERNAL MEDICINE

## 2022-01-01 PROCEDURE — 72146 MRI CHEST SPINE W/O DYE: CPT | Performed by: INTERNAL MEDICINE

## 2022-01-01 PROCEDURE — 43239 EGD BIOPSY SINGLE/MULTIPLE: CPT | Performed by: INTERNAL MEDICINE

## 2022-01-01 PROCEDURE — 72128 CT CHEST SPINE W/O DYE: CPT | Performed by: EMERGENCY MEDICINE

## 2022-01-01 PROCEDURE — 99232 SBSQ HOSP IP/OBS MODERATE 35: CPT | Performed by: INTERNAL MEDICINE

## 2022-01-01 PROCEDURE — 71045 X-RAY EXAM CHEST 1 VIEW: CPT | Performed by: INTERNAL MEDICINE

## 2022-01-01 PROCEDURE — 99232 SBSQ HOSP IP/OBS MODERATE 35: CPT | Performed by: PHYSICIAN ASSISTANT

## 2022-01-01 PROCEDURE — 99232 SBSQ HOSP IP/OBS MODERATE 35: CPT | Performed by: NURSE PRACTITIONER

## 2022-01-01 PROCEDURE — 71045 X-RAY EXAM CHEST 1 VIEW: CPT | Performed by: HOSPITALIST

## 2022-01-01 PROCEDURE — 99231 SBSQ HOSP IP/OBS SF/LOW 25: CPT | Performed by: NURSE PRACTITIONER

## 2022-01-01 PROCEDURE — 93970 EXTREMITY STUDY: CPT | Performed by: EMERGENCY MEDICINE

## 2022-01-01 PROCEDURE — 72131 CT LUMBAR SPINE W/O DYE: CPT | Performed by: EMERGENCY MEDICINE

## 2022-01-01 RX ORDER — ACETAMINOPHEN 650 MG/1
650 SUPPOSITORY RECTAL EVERY 6 HOURS SCHEDULED
Status: DISCONTINUED | OUTPATIENT
Start: 2022-01-01 | End: 2022-01-01

## 2022-01-01 RX ORDER — DOCUSATE SODIUM 100 MG/1
100 CAPSULE, LIQUID FILLED ORAL 2 TIMES DAILY
Status: DISCONTINUED | OUTPATIENT
Start: 2022-01-01 | End: 2022-01-01

## 2022-01-01 RX ORDER — HYDROMORPHONE HYDROCHLORIDE 1 MG/ML
0.2 INJECTION, SOLUTION INTRAMUSCULAR; INTRAVENOUS; SUBCUTANEOUS EVERY 2 HOUR PRN
Status: DISCONTINUED | OUTPATIENT
Start: 2022-01-01 | End: 2022-01-01

## 2022-01-01 RX ORDER — HYDROMORPHONE HYDROCHLORIDE 1 MG/ML
0.2 INJECTION, SOLUTION INTRAMUSCULAR; INTRAVENOUS; SUBCUTANEOUS ONCE
Status: COMPLETED | OUTPATIENT
Start: 2022-01-01 | End: 2022-01-01

## 2022-01-01 RX ORDER — MORPHINE SULFATE 30 MG/1
30 TABLET ORAL EVERY 4 HOURS PRN
Status: DISCONTINUED | OUTPATIENT
Start: 2022-01-01 | End: 2022-01-01

## 2022-01-01 RX ORDER — LEVOFLOXACIN 5 MG/ML
500 INJECTION, SOLUTION INTRAVENOUS EVERY 24 HOURS
Status: DISCONTINUED | OUTPATIENT
Start: 2022-01-01 | End: 2022-01-01

## 2022-01-01 RX ORDER — FENTANYL 100 UG/H
1 PATCH TRANSDERMAL
Status: DISCONTINUED | OUTPATIENT
Start: 2022-01-01 | End: 2022-01-01

## 2022-01-01 RX ORDER — DOCUSATE SODIUM 100 MG/1
CAPSULE, LIQUID FILLED ORAL
Qty: 60 CAPSULE | Refills: 2 | Status: ON HOLD | OUTPATIENT
Start: 2022-01-01 | End: 2022-01-01

## 2022-01-01 RX ORDER — MELATONIN
325
Status: DISCONTINUED | OUTPATIENT
Start: 2022-01-01 | End: 2022-01-01

## 2022-01-01 RX ORDER — FUROSEMIDE 20 MG/1
20 TABLET ORAL DAILY
Status: DISCONTINUED | OUTPATIENT
Start: 2022-01-01 | End: 2022-01-01

## 2022-01-01 RX ORDER — MORPHINE SULFATE 2 MG/ML
2 INJECTION, SOLUTION INTRAMUSCULAR; INTRAVENOUS EVERY 2 HOUR PRN
Status: DISCONTINUED | OUTPATIENT
Start: 2022-01-01 | End: 2022-01-01

## 2022-01-01 RX ORDER — HYDRALAZINE HYDROCHLORIDE 20 MG/ML
10 INJECTION INTRAMUSCULAR; INTRAVENOUS EVERY 4 HOURS PRN
Status: DISCONTINUED | OUTPATIENT
Start: 2022-01-01 | End: 2022-01-01

## 2022-01-01 RX ORDER — MORPHINE SULFATE 4 MG/ML
4 INJECTION, SOLUTION INTRAMUSCULAR; INTRAVENOUS ONCE
Status: COMPLETED | OUTPATIENT
Start: 2022-01-01 | End: 2022-01-01

## 2022-01-01 RX ORDER — MORPHINE SULFATE 30 MG/1
60 TABLET ORAL 3 TIMES DAILY PRN
Status: DISCONTINUED | OUTPATIENT
Start: 2022-01-01 | End: 2022-01-01

## 2022-01-01 RX ORDER — ATROPINE SULFATE 10 MG/ML
2 SOLUTION/ DROPS OPHTHALMIC EVERY 2 HOUR PRN
Status: DISCONTINUED | OUTPATIENT
Start: 2022-01-01 | End: 2022-01-01

## 2022-01-01 RX ORDER — HEPARIN SODIUM 5000 [USP'U]/ML
5000 INJECTION, SOLUTION INTRAVENOUS; SUBCUTANEOUS EVERY 12 HOURS SCHEDULED
Status: DISCONTINUED | OUTPATIENT
Start: 2022-01-01 | End: 2022-01-01

## 2022-01-01 RX ORDER — GLYCOPYRROLATE 0.2 MG/ML
0.4 INJECTION, SOLUTION INTRAMUSCULAR; INTRAVENOUS
Status: DISCONTINUED | OUTPATIENT
Start: 2022-01-01 | End: 2022-01-01

## 2022-01-01 RX ORDER — NALOXONE HYDROCHLORIDE 0.4 MG/ML
80 INJECTION, SOLUTION INTRAMUSCULAR; INTRAVENOUS; SUBCUTANEOUS AS NEEDED
Status: DISCONTINUED | OUTPATIENT
Start: 2022-01-01 | End: 2022-01-01 | Stop reason: HOSPADM

## 2022-01-01 RX ORDER — VANCOMYCIN HYDROCHLORIDE 125 MG/1
125 CAPSULE ORAL DAILY
Status: DISCONTINUED | OUTPATIENT
Start: 2022-01-01 | End: 2022-01-01

## 2022-01-01 RX ORDER — LEVOFLOXACIN 5 MG/ML
750 INJECTION, SOLUTION INTRAVENOUS
Status: DISCONTINUED | OUTPATIENT
Start: 2022-01-01 | End: 2022-01-01

## 2022-01-01 RX ORDER — ONDANSETRON 2 MG/ML
4 INJECTION INTRAMUSCULAR; INTRAVENOUS EVERY 6 HOURS PRN
Status: DISCONTINUED | OUTPATIENT
Start: 2022-01-01 | End: 2022-01-01

## 2022-01-01 RX ORDER — ALPRAZOLAM 0.5 MG/1
0.5 TABLET ORAL
Status: ACTIVE | OUTPATIENT
Start: 2022-01-01 | End: 2022-01-01

## 2022-01-01 RX ORDER — MIRTAZAPINE 30 MG/1
30 TABLET, FILM COATED ORAL NIGHTLY
Status: DISCONTINUED | OUTPATIENT
Start: 2022-01-01 | End: 2022-01-01

## 2022-01-01 RX ORDER — FENTANYL 100 UG/H
1 PATCH TRANSDERMAL
Status: CANCELLED | OUTPATIENT
Start: 2022-01-01

## 2022-01-01 RX ORDER — HYDROMORPHONE HYDROCHLORIDE 1 MG/ML
0.4 INJECTION, SOLUTION INTRAMUSCULAR; INTRAVENOUS; SUBCUTANEOUS EVERY 2 HOUR PRN
Status: DISCONTINUED | OUTPATIENT
Start: 2022-01-01 | End: 2022-01-01

## 2022-01-01 RX ORDER — ALPRAZOLAM 0.5 MG/1
0.5 TABLET ORAL
Status: COMPLETED | OUTPATIENT
Start: 2022-01-01 | End: 2022-01-01

## 2022-01-01 RX ORDER — LOSARTAN POTASSIUM 25 MG/1
25 TABLET ORAL DAILY
Status: DISCONTINUED | OUTPATIENT
Start: 2022-01-01 | End: 2022-01-01

## 2022-01-01 RX ORDER — PANTOPRAZOLE SODIUM 40 MG/1
40 TABLET, DELAYED RELEASE ORAL
Status: DISCONTINUED | OUTPATIENT
Start: 2022-01-01 | End: 2022-01-01

## 2022-01-01 RX ORDER — SODIUM CHLORIDE 9 MG/ML
500 INJECTION, SOLUTION INTRAVENOUS CONTINUOUS
Status: ACTIVE | OUTPATIENT
Start: 2022-01-01 | End: 2022-01-01

## 2022-01-01 RX ORDER — BISACODYL 10 MG
10 SUPPOSITORY, RECTAL RECTAL
Status: DISCONTINUED | OUTPATIENT
Start: 2022-01-01 | End: 2022-01-01

## 2022-01-01 RX ORDER — ACETAMINOPHEN 650 MG/1
650 SUPPOSITORY RECTAL EVERY 6 HOURS PRN
Status: DISCONTINUED | OUTPATIENT
Start: 2022-01-01 | End: 2022-01-01

## 2022-01-01 RX ORDER — LOSARTAN POTASSIUM 50 MG/1
TABLET ORAL
Qty: 30 TABLET | Refills: 3 | OUTPATIENT
Start: 2022-01-01

## 2022-01-01 RX ORDER — MORPHINE SULFATE 30 MG/1
45 TABLET ORAL EVERY 4 HOURS PRN
Status: DISCONTINUED | OUTPATIENT
Start: 2022-01-01 | End: 2022-01-01

## 2022-01-01 RX ORDER — ACETAMINOPHEN 160 MG/5ML
650 SOLUTION ORAL EVERY 6 HOURS PRN
Status: DISCONTINUED | OUTPATIENT
Start: 2022-01-01 | End: 2022-01-01

## 2022-01-01 RX ORDER — HYDROMORPHONE HCL IN 0.9% NACL 0.2 MG/ML
0.2 PLASTIC BAG, INJECTION (ML) INTRAVENOUS CONTINUOUS PRN
Status: DISCONTINUED | OUTPATIENT
Start: 2022-01-01 | End: 2022-01-01

## 2022-01-01 RX ORDER — METOCLOPRAMIDE HYDROCHLORIDE 5 MG/ML
5 INJECTION INTRAMUSCULAR; INTRAVENOUS
Status: DISCONTINUED | OUTPATIENT
Start: 2022-01-01 | End: 2022-01-01

## 2022-01-01 RX ORDER — POTASSIUM CHLORIDE 20 MEQ/1
TABLET, EXTENDED RELEASE ORAL
Qty: 90 TABLET | Refills: 0 | OUTPATIENT
Start: 2022-01-01

## 2022-01-01 RX ORDER — SODIUM CHLORIDE 9 MG/ML
INJECTION INTRAVENOUS
Status: COMPLETED
Start: 2022-01-01 | End: 2022-01-01

## 2022-01-01 RX ORDER — BISACODYL 10 MG
10 SUPPOSITORY, RECTAL RECTAL ONCE
Status: COMPLETED | OUTPATIENT
Start: 2022-01-01 | End: 2022-01-01

## 2022-01-01 RX ORDER — HYDROMORPHONE HYDROCHLORIDE 4 MG/1
8 TABLET ORAL ONCE
Status: COMPLETED | OUTPATIENT
Start: 2022-01-01 | End: 2022-01-01

## 2022-01-01 RX ORDER — CALCIUM CARBONATE 500(1250)
500 TABLET ORAL DAILY
Status: DISCONTINUED | OUTPATIENT
Start: 2022-01-01 | End: 2022-01-01

## 2022-01-01 RX ORDER — DOCUSATE SODIUM 50 MG AND SENNOSIDES 8.6 MG 8.6; 5 MG/1; MG/1
TABLET, FILM COATED ORAL
Qty: 180 TABLET | Refills: 3 | Status: ON HOLD | OUTPATIENT
Start: 2022-01-01 | End: 2022-01-01

## 2022-01-01 RX ORDER — DULOXETIN HYDROCHLORIDE 60 MG/1
60 CAPSULE, DELAYED RELEASE ORAL DAILY
Status: DISCONTINUED | OUTPATIENT
Start: 2022-01-01 | End: 2022-01-01

## 2022-01-01 RX ORDER — FENTANYL 75 UG/H
1 PATCH TRANSDERMAL
Status: DISCONTINUED | OUTPATIENT
Start: 2022-01-01 | End: 2022-01-01

## 2022-01-01 RX ORDER — HYDROMORPHONE HYDROCHLORIDE 4 MG/1
4 TABLET ORAL EVERY 4 HOURS PRN
Status: DISCONTINUED | OUTPATIENT
Start: 2022-01-01 | End: 2022-01-01

## 2022-01-01 RX ORDER — SENNA AND DOCUSATE SODIUM 50; 8.6 MG/1; MG/1
2 TABLET, FILM COATED ORAL 3 TIMES DAILY
Status: DISCONTINUED | OUTPATIENT
Start: 2022-01-01 | End: 2022-01-01

## 2022-01-01 RX ORDER — ACETAMINOPHEN 325 MG/1
650 TABLET ORAL EVERY 6 HOURS PRN
Status: DISCONTINUED | OUTPATIENT
Start: 2022-01-01 | End: 2022-01-01

## 2022-01-01 RX ORDER — SCOLOPAMINE TRANSDERMAL SYSTEM 1 MG/1
1 PATCH, EXTENDED RELEASE TRANSDERMAL
Status: DISCONTINUED | OUTPATIENT
Start: 2022-01-01 | End: 2022-01-01

## 2022-01-01 RX ORDER — POTASSIUM CHLORIDE 20 MEQ/1
20 TABLET, EXTENDED RELEASE ORAL DAILY
Status: DISCONTINUED | OUTPATIENT
Start: 2022-01-01 | End: 2022-01-01

## 2022-01-01 RX ORDER — HYDROMORPHONE HYDROCHLORIDE 8 MG/1
8 TABLET ORAL EVERY 4 HOURS PRN
Qty: 80 TABLET | Refills: 0 | Status: SHIPPED | OUTPATIENT
Start: 2022-01-01 | End: 2022-01-01

## 2022-01-01 RX ORDER — HYDROMORPHONE HYDROCHLORIDE 1 MG/ML
0.8 INJECTION, SOLUTION INTRAMUSCULAR; INTRAVENOUS; SUBCUTANEOUS EVERY 2 HOUR PRN
Status: DISCONTINUED | OUTPATIENT
Start: 2022-01-01 | End: 2022-01-01

## 2022-01-01 RX ORDER — POLYETHYLENE GLYCOL 3350 17 G/17G
17 POWDER, FOR SOLUTION ORAL DAILY
Status: DISCONTINUED | OUTPATIENT
Start: 2022-01-01 | End: 2022-01-01

## 2022-01-01 RX ORDER — SODIUM CHLORIDE, SODIUM LACTATE, POTASSIUM CHLORIDE, CALCIUM CHLORIDE 600; 310; 30; 20 MG/100ML; MG/100ML; MG/100ML; MG/100ML
INJECTION, SOLUTION INTRAVENOUS CONTINUOUS
Status: DISCONTINUED | OUTPATIENT
Start: 2022-01-01 | End: 2022-01-01

## 2022-01-01 RX ORDER — MORPHINE SULFATE 30 MG/1
45 TABLET ORAL 3 TIMES DAILY PRN
Status: DISCONTINUED | OUTPATIENT
Start: 2022-01-01 | End: 2022-01-01

## 2022-01-01 RX ORDER — POTASSIUM CHLORIDE 14.9 MG/ML
20 INJECTION INTRAVENOUS ONCE
Status: DISCONTINUED | OUTPATIENT
Start: 2022-01-01 | End: 2022-01-01

## 2022-01-01 RX ORDER — PRAVASTATIN SODIUM 20 MG
20 TABLET ORAL NIGHTLY
Status: DISCONTINUED | OUTPATIENT
Start: 2022-01-01 | End: 2022-01-01

## 2022-01-01 RX ORDER — HALOPERIDOL 5 MG/ML
1 INJECTION INTRAMUSCULAR
Status: DISCONTINUED | OUTPATIENT
Start: 2022-01-01 | End: 2022-01-01

## 2022-01-01 RX ORDER — FUROSEMIDE 10 MG/ML
40 INJECTION INTRAMUSCULAR; INTRAVENOUS EVERY 8 HOURS PRN
Status: DISCONTINUED | OUTPATIENT
Start: 2022-01-01 | End: 2022-01-01

## 2022-01-01 RX ORDER — HYDROMORPHONE HYDROCHLORIDE 4 MG/1
8 TABLET ORAL EVERY 4 HOURS PRN
Status: DISCONTINUED | OUTPATIENT
Start: 2022-01-01 | End: 2022-01-01

## 2022-01-01 RX ORDER — MORPHINE SULFATE 4 MG/ML
4 INJECTION, SOLUTION INTRAMUSCULAR; INTRAVENOUS EVERY 2 HOUR PRN
Status: DISCONTINUED | OUTPATIENT
Start: 2022-01-01 | End: 2022-01-01

## 2022-01-01 RX ORDER — FUROSEMIDE 20 MG/1
TABLET ORAL
Qty: 270 TABLET | Refills: 0 | OUTPATIENT
Start: 2022-01-01

## 2022-01-01 RX ORDER — LOSARTAN POTASSIUM 50 MG/1
50 TABLET ORAL DAILY
Status: DISCONTINUED | OUTPATIENT
Start: 2022-01-01 | End: 2022-01-01

## 2022-01-01 RX ORDER — HYDROMORPHONE HYDROCHLORIDE 1 MG/ML
1 INJECTION, SOLUTION INTRAMUSCULAR; INTRAVENOUS; SUBCUTANEOUS
Status: DISCONTINUED | OUTPATIENT
Start: 2022-01-01 | End: 2022-01-01

## 2022-01-01 RX ORDER — ALPRAZOLAM 0.25 MG/1
0.25 TABLET ORAL 2 TIMES DAILY PRN
Status: DISCONTINUED | OUTPATIENT
Start: 2022-01-01 | End: 2022-01-01

## 2022-01-01 RX ORDER — FENTANYL 100 UG/H
1 PATCH TRANSDERMAL
Qty: 10 PATCH | Refills: 0 | Status: SHIPPED | OUTPATIENT
Start: 2022-01-01 | End: 2022-01-01

## 2022-01-01 RX ORDER — ALPRAZOLAM 0.25 MG/1
0.25 TABLET ORAL ONCE
Status: COMPLETED | OUTPATIENT
Start: 2022-01-01 | End: 2022-01-01

## 2022-01-01 RX ORDER — LORAZEPAM 2 MG/ML
1 INJECTION INTRAMUSCULAR EVERY 4 HOURS PRN
Status: DISCONTINUED | OUTPATIENT
Start: 2022-01-01 | End: 2022-01-01

## 2022-01-17 RX ORDER — LOSARTAN POTASSIUM 50 MG/1
TABLET ORAL
Qty: 30 TABLET | Refills: 3 | OUTPATIENT
Start: 2022-01-17

## 2022-01-18 NOTE — TELEPHONE ENCOUNTER
called requesting refills for Losartan, pt takes 50mg in the am and pm  If possible that doctor can send it  To please inform him

## 2022-01-19 RX ORDER — LOSARTAN POTASSIUM AND HYDROCHLOROTHIAZIDE 12.5; 5 MG/1; MG/1
1 TABLET ORAL 2 TIMES DAILY
Qty: 180 TABLET | Refills: 1 | Status: SHIPPED | OUTPATIENT
Start: 2022-01-19

## 2022-01-19 NOTE — TELEPHONE ENCOUNTER
Writer does not see this medication on current medication list    Dr Ioana Lau please advise on medication and SIG is correct.     Prescription pending if medication is correct

## 2022-01-19 NOTE — TELEPHONE ENCOUNTER
of pt is calling asking if doctor is able to change dosage of medication losartan.  is saying if pt can prescribe the losartan to be taken 50 mg in the morning and 50 mg evening to stabilized blood pressure.      Preferred pharmacy  [facility]

## 2022-01-27 ENCOUNTER — TELEPHONE (OUTPATIENT)
Dept: HEMATOLOGY/ONCOLOGY | Facility: HOSPITAL | Age: 77
End: 2022-01-27

## 2022-01-27 DIAGNOSIS — C80.1 METASTASIS TO VERTEBRAL COLUMN OF UNKNOWN ORIGIN (HCC): ICD-10-CM

## 2022-01-27 DIAGNOSIS — G89.3 CANCER RELATED PAIN: ICD-10-CM

## 2022-01-27 DIAGNOSIS — C79.51 METASTASIS TO VERTEBRAL COLUMN OF UNKNOWN ORIGIN (HCC): ICD-10-CM

## 2022-01-27 RX ORDER — FENTANYL 75 UG/H
1 PATCH TRANSDERMAL
Qty: 10 PATCH | Refills: 0 | Status: CANCELLED | OUTPATIENT
Start: 2022-01-27

## 2022-01-27 RX ORDER — FENTANYL 75 UG/H
1 PATCH TRANSDERMAL
Qty: 10 PATCH | Refills: 0 | Status: SHIPPED | OUTPATIENT
Start: 2022-01-27

## 2022-01-27 NOTE — TELEPHONE ENCOUNTER
Patient's  called and he wants various labs for his wife to get at her upcoming appointment. Can you please let patient's  know what labs his wife needs. Her 's name is Yelitza Thompson and his phone number ist [phone]. Thank you.

## 2022-01-27 NOTE — TELEPHONE ENCOUNTER
Fentanyl patches sent to [facility] in [place]. Please call  to let him know. Thank you.

## 2022-01-27 NOTE — TELEPHONE ENCOUNTER
Returned phone call and confirmed will have Magnesium level drawn on 2/2/22.  requesting a Vitamin D level. Informed would be able to draw on same day if Dr. Joey Salazar puts in an order in the system.

## 2022-01-28 ENCOUNTER — TELEPHONE (OUTPATIENT)
Dept: FAMILY MEDICINE CLINIC | Facility: CLINIC | Age: 77
End: 2022-01-28

## 2022-01-28 DIAGNOSIS — E55.9 VITAMIN D DEFICIENCY: Primary | ICD-10-CM

## 2022-01-28 NOTE — TELEPHONE ENCOUNTER
called requesting a order for pt to get her VITAMIN D levels drawn    To please inform him when this has been placed

## 2022-02-02 ENCOUNTER — OFFICE VISIT (OUTPATIENT)
Dept: HEMATOLOGY/ONCOLOGY | Facility: HOSPITAL | Age: 77
End: 2022-02-02
Attending: NURSE PRACTITIONER
Payer: COMMERCIAL

## 2022-02-02 ENCOUNTER — NURSE ONLY (OUTPATIENT)
Dept: HEMATOLOGY/ONCOLOGY | Facility: HOSPITAL | Age: 77
End: 2022-02-02
Attending: INTERNAL MEDICINE
Payer: COMMERCIAL

## 2022-02-02 VITALS
SYSTOLIC BLOOD PRESSURE: 139 MMHG | DIASTOLIC BLOOD PRESSURE: 61 MMHG | WEIGHT: 114 LBS | RESPIRATION RATE: 18 BRPM | OXYGEN SATURATION: 100 % | TEMPERATURE: 99 F | HEIGHT: 60 IN | HEART RATE: 70 BPM | BODY MASS INDEX: 22.38 KG/M2

## 2022-02-02 VITALS
HEIGHT: 60 IN | OXYGEN SATURATION: 100 % | SYSTOLIC BLOOD PRESSURE: 139 MMHG | HEART RATE: 70 BPM | WEIGHT: 114 LBS | BODY MASS INDEX: 22.38 KG/M2 | DIASTOLIC BLOOD PRESSURE: 61 MMHG | RESPIRATION RATE: 18 BRPM

## 2022-02-02 DIAGNOSIS — T40.2X5A THERAPEUTIC OPIOID INDUCED CONSTIPATION: ICD-10-CM

## 2022-02-02 DIAGNOSIS — K59.03 THERAPEUTIC OPIOID INDUCED CONSTIPATION: ICD-10-CM

## 2022-02-02 DIAGNOSIS — Z51.81 ENCOUNTER FOR MEDICATION MONITORING: ICD-10-CM

## 2022-02-02 DIAGNOSIS — Z51.5 PALLIATIVE CARE ENCOUNTER: ICD-10-CM

## 2022-02-02 DIAGNOSIS — D64.9 ANEMIA, UNSPECIFIED TYPE: ICD-10-CM

## 2022-02-02 DIAGNOSIS — C79.51 METASTASIS TO VERTEBRAL COLUMN OF UNKNOWN ORIGIN (HCC): ICD-10-CM

## 2022-02-02 DIAGNOSIS — C79.51 BONE METASTASIS (HCC): ICD-10-CM

## 2022-02-02 DIAGNOSIS — Z51.81 MEDICATION MONITORING ENCOUNTER: ICD-10-CM

## 2022-02-02 DIAGNOSIS — F41.9 ANXIETY: ICD-10-CM

## 2022-02-02 DIAGNOSIS — C80.1 METASTASIS TO VERTEBRAL COLUMN OF UNKNOWN ORIGIN (HCC): ICD-10-CM

## 2022-02-02 DIAGNOSIS — R11.2 CINV (CHEMOTHERAPY-INDUCED NAUSEA AND VOMITING): ICD-10-CM

## 2022-02-02 DIAGNOSIS — F40.240 CLAUSTROPHOBIA: ICD-10-CM

## 2022-02-02 DIAGNOSIS — Z71.89 GOALS OF CARE, COUNSELING/DISCUSSION: ICD-10-CM

## 2022-02-02 DIAGNOSIS — G89.3 CANCER RELATED PAIN: Primary | ICD-10-CM

## 2022-02-02 DIAGNOSIS — C34.32 MALIGNANT NEOPLASM OF LOWER LOBE OF LEFT LUNG (HCC): ICD-10-CM

## 2022-02-02 DIAGNOSIS — C79.31 BRAIN METASTASIS (HCC): ICD-10-CM

## 2022-02-02 DIAGNOSIS — Z71.89 ADVANCE CARE PLANNING: ICD-10-CM

## 2022-02-02 DIAGNOSIS — T45.1X5A CINV (CHEMOTHERAPY-INDUCED NAUSEA AND VOMITING): ICD-10-CM

## 2022-02-02 DIAGNOSIS — R11.0 NAUSEA: ICD-10-CM

## 2022-02-02 DIAGNOSIS — E55.9 VITAMIN D DEFICIENCY: ICD-10-CM

## 2022-02-02 DIAGNOSIS — F32.A DEPRESSION, UNSPECIFIED DEPRESSION TYPE: ICD-10-CM

## 2022-02-02 DIAGNOSIS — C34.90 PRIMARY MALIGNANT NEOPLASM OF LUNG METASTATIC TO OTHER SITE (HCC): ICD-10-CM

## 2022-02-02 DIAGNOSIS — C34.32 MALIGNANT NEOPLASM OF LOWER LOBE OF LEFT LUNG (HCC): Primary | ICD-10-CM

## 2022-02-02 DIAGNOSIS — G47.9 DIFFICULTY SLEEPING: ICD-10-CM

## 2022-02-02 LAB
ALBUMIN SERPL-MCNC: 3.7 G/DL (ref 3.4–5)
ALBUMIN/GLOB SERPL: 1.2 {RATIO} (ref 1–2)
ALP LIVER SERPL-CCNC: 113 U/L
ALT SERPL-CCNC: 23 U/L
ANION GAP SERPL CALC-SCNC: 3 MMOL/L (ref 0–18)
AST SERPL-CCNC: 15 U/L (ref 15–37)
BASOPHILS # BLD AUTO: 0.02 X10(3) UL (ref 0–0.2)
BASOPHILS NFR BLD AUTO: 0.4 %
BILIRUB SERPL-MCNC: 0.3 MG/DL (ref 0.1–2)
BUN BLD-MCNC: 12 MG/DL (ref 7–18)
BUN/CREAT SERPL: 20.3 (ref 10–20)
CALCIUM BLD-MCNC: 9.5 MG/DL (ref 8.5–10.1)
CHLORIDE SERPL-SCNC: 104 MMOL/L (ref 98–112)
CO2 SERPL-SCNC: 30 MMOL/L (ref 21–32)
CREAT BLD-MCNC: 0.59 MG/DL
DEPRECATED HBV CORE AB SER IA-ACNC: 263.1 NG/ML
DEPRECATED RDW RBC AUTO: 46.3 FL (ref 35.1–46.3)
EOSINOPHIL # BLD AUTO: 0.11 X10(3) UL (ref 0–0.7)
EOSINOPHIL NFR BLD AUTO: 2.4 %
ERYTHROCYTE [DISTWIDTH] IN BLOOD BY AUTOMATED COUNT: 14.3 % (ref 11–15)
GLOBULIN PLAS-MCNC: 3.1 G/DL (ref 2.8–4.4)
GLUCOSE BLD-MCNC: 101 MG/DL (ref 70–99)
HCT VFR BLD AUTO: 30.8 %
HGB BLD-MCNC: 9.6 G/DL
IMM GRANULOCYTES # BLD AUTO: 0.01 X10(3) UL (ref 0–1)
IMM GRANULOCYTES NFR BLD: 0.2 %
IRON SATN MFR SERPL: 17 %
IRON SERPL-MCNC: 56 UG/DL
LYMPHOCYTES # BLD AUTO: 0.92 X10(3) UL (ref 1–4)
LYMPHOCYTES NFR BLD AUTO: 20 %
MCH RBC QN AUTO: 27.6 PG (ref 26–34)
MCV RBC AUTO: 88.5 FL
MONOCYTES # BLD AUTO: 0.45 X10(3) UL (ref 0.1–1)
MONOCYTES NFR BLD AUTO: 9.8 %
NEUTROPHILS # BLD AUTO: 3.08 X10 (3) UL (ref 1.5–7.7)
NEUTROPHILS # BLD AUTO: 3.08 X10(3) UL (ref 1.5–7.7)
NEUTROPHILS NFR BLD AUTO: 67.2 %
OSMOLALITY SERPL CALC.SUM OF ELEC: 284 MOSM/KG (ref 275–295)
PLATELET # BLD AUTO: 168 10(3)UL (ref 150–450)
POTASSIUM SERPL-SCNC: 4.2 MMOL/L (ref 3.5–5.1)
PROT SERPL-MCNC: 6.8 G/DL (ref 6.4–8.2)
RBC # BLD AUTO: 3.48 X10(6)UL
SODIUM SERPL-SCNC: 137 MMOL/L (ref 136–145)
TIBC SERPL-MCNC: 325 UG/DL (ref 240–450)
TRANSFERRIN SERPL-MCNC: 218 MG/DL (ref 200–360)
VIT D+METAB SERPL-MCNC: 49.5 NG/ML (ref 30–100)
WBC # BLD AUTO: 4.6 X10(3) UL (ref 4–11)

## 2022-02-02 PROCEDURE — 83735 ASSAY OF MAGNESIUM: CPT

## 2022-02-02 PROCEDURE — 82728 ASSAY OF FERRITIN: CPT

## 2022-02-02 PROCEDURE — 85025 COMPLETE CBC W/AUTO DIFF WBC: CPT

## 2022-02-02 PROCEDURE — 84466 ASSAY OF TRANSFERRIN: CPT

## 2022-02-02 PROCEDURE — 83540 ASSAY OF IRON: CPT

## 2022-02-02 PROCEDURE — 99417 PROLNG OP E/M EACH 15 MIN: CPT | Performed by: NURSE PRACTITIONER

## 2022-02-02 PROCEDURE — 80053 COMPREHEN METABOLIC PANEL: CPT

## 2022-02-02 PROCEDURE — 36415 COLL VENOUS BLD VENIPUNCTURE: CPT

## 2022-02-02 PROCEDURE — 99215 OFFICE O/P EST HI 40 MIN: CPT | Performed by: INTERNAL MEDICINE

## 2022-02-02 PROCEDURE — 82306 VITAMIN D 25 HYDROXY: CPT

## 2022-02-02 PROCEDURE — 99215 OFFICE O/P EST HI 40 MIN: CPT | Performed by: NURSE PRACTITIONER

## 2022-02-02 RX ORDER — MORPHINE SULFATE 30 MG/1
30 TABLET ORAL
Qty: 90 TABLET | Refills: 0 | Status: SHIPPED | OUTPATIENT
Start: 2022-02-02

## 2022-02-02 RX ORDER — ALPRAZOLAM 0.25 MG/1
0.25 TABLET ORAL 2 TIMES DAILY PRN
Qty: 4 TABLET | Refills: 0 | Status: SHIPPED | OUTPATIENT
Start: 2022-02-02

## 2022-02-02 RX ORDER — MIRTAZAPINE 30 MG/1
30 TABLET, FILM COATED ORAL NIGHTLY
Qty: 30 TABLET | Refills: 3 | Status: SHIPPED | OUTPATIENT
Start: 2022-02-02

## 2022-02-02 RX ORDER — ONDANSETRON 4 MG/1
4 TABLET, FILM COATED ORAL EVERY 8 HOURS PRN
Qty: 90 TABLET | Refills: 0 | Status: SHIPPED | OUTPATIENT
Start: 2022-02-02

## 2022-02-02 NOTE — PATIENT INSTRUCTIONS
Recent Results (from the past 24 hour(s))   COMP METABOLIC PANEL (14)    Collection Time: 02/02/22 10:25 AM   Result Value Ref Range    Glucose 101 (H) 70 - 99 mg/dL    Sodium 137 136 - 145 mmol/L    Potassium 4.2 3.5 - 5.1 mmol/L    Chloride 104 98 - 112 mmol/L    CO2 30.0 21.0 - 32.0 mmol/L    Anion Gap 3 0 - 18 mmol/L    BUN 12 7 - 18 mg/dL    Creatinine 0.59 0.55 - 1.02 mg/dL    BUN/CREA Ratio 20.3 (H) 10.0 - 20.0    Calcium, Total 9.5 8.5 - 10.1 mg/dL    Calculated Osmolality 284 275 - 295 mOsm/kg    GFR, Non- 89 >=60    GFR, -American 103 >=60    ALT 23 13 - 56 U/L    AST 15 15 - 37 U/L    Alkaline Phosphatase 113 55 - 142 U/L    Bilirubin, Total 0.3 0.1 - 2.0 mg/dL    Total Protein 6.8 6.4 - 8.2 g/dL    Albumin 3.7 3.4 - 5.0 g/dL    Globulin  3.1 2.8 - 4.4 g/dL    A/G Ratio 1.2 1.0 - 2.0    Patient Fasting for CMP?  Patient not present    IRON AND TIBC    Collection Time: 02/02/22 10:25 AM   Result Value Ref Range    Iron 56 50 - 170 ug/dL    Transferrin 218 200 - 360 mg/dL    Total Iron Binding Capacity 325 240 - 450 ug/dL    % Saturation 17 15 - 50 %   FERRITIN    Collection Time: 02/02/22 10:25 AM   Result Value Ref Range    Ferritin 263.1 18.0 - 340.0 ng/mL   CBC W/ DIFFERENTIAL    Collection Time: 02/02/22 10:25 AM   Result Value Ref Range    WBC 4.6 4.0 - 11.0 x10(3) uL    RBC 3.48 (L) 3.80 - 5.30 x10(6)uL    HGB 9.6 (L) 12.0 - 16.0 g/dL    HCT 30.8 (L) 35.0 - 48.0 %    MCV 88.5 80.0 - 100.0 fL    MCH 27.6 26.0 - 34.0 pg    MCHC 31.2 31.0 - 37.0 g/dL    RDW-SD 46.3 35.1 - 46.3 fL    RDW 14.3 11.0 - 15.0 %    .0 150.0 - 450.0 10(3)uL    Neutrophil Absolute Prelim 3.08 1.50 - 7.70 x10 (3) uL    Neutrophil Absolute 3.08 1.50 - 7.70 x10(3) uL    Lymphocyte Absolute 0.92 (L) 1.00 - 4.00 x10(3) uL    Monocyte Absolute 0.45 0.10 - 1.00 x10(3) uL    Eosinophil Absolute 0.11 0.00 - 0.70 x10(3) uL    Basophil Absolute 0.02 0.00 - 0.20 x10(3) uL    Immature Granulocyte Absolute 0.01 0.00 - 1.00 x10(3) uL    Neutrophil % 67.2 %    Lymphocyte % 20.0 %    Monocyte % 9.8 %    Eosinophil % 2.4 %    Basophil % 0.4 %    Immature Granulocyte % 0.2 %   VITAMIN D, 25-HYDROXY    Collection Time: 02/02/22 10:31 AM   Result Value Ref Range    Vitamin D, 25OH, Total 49.5 30.0 - 100.0 ng/mL

## 2022-02-03 LAB — MAGNESIUM SERPL-MCNC: 1.9 MG/DL (ref 1.7–2.8)

## 2022-02-22 RX ORDER — FENTANYL 75 UG/H
1 PATCH TRANSDERMAL
Qty: 5 PATCH | Refills: 0 | Status: SHIPPED | OUTPATIENT
Start: 2022-02-25 | End: 2022-03-11

## 2022-02-23 ENCOUNTER — TELEPHONE (OUTPATIENT)
Dept: HEMATOLOGY/ONCOLOGY | Facility: HOSPITAL | Age: 77
End: 2022-02-23

## 2022-02-23 NOTE — TELEPHONE ENCOUNTER
Shalom Parker ([facility] in [place] [address]) called to clarify a fentanyl prescription ordered on 2/22/2022 to start on 2/25/2022. I confirmed the order as FentaNYL 75 MCG/HR Transdermal patch (Apply 1 patch onto skin, change every 3 days for 15 days) QTY 5 patches. The patient told the pharmacist she should be receiving 10 patches. I told him the QTY was for 5 patches. He said he would update the patient.

## 2022-02-23 NOTE — TELEPHONE ENCOUNTER
Returned phone call and notified that does not meet guidelines per Dr. Bijan Dawn at this time for Karyna but, may in the future.

## 2022-02-23 NOTE — TELEPHONE ENCOUNTER
Az Jackson is calling to see if his wife can take the the Mays shot, Please call Az Jackson at [phone], Star Wedge Flap Text: The defect edges were debeveled with a #15 scalpel blade.  Given the location of the defect, shape of the defect and the proximity to free margins a star wedge flap was deemed most appropriate.  Using a sterile surgical marker, an appropriate rotation flap was drawn incorporating the defect and placing the expected incisions within the relaxed skin tension lines where possible. The area thus outlined was incised deep to adipose tissue with a #15 scalpel blade.  The skin margins were undermined to an appropriate distance in all directions utilizing iris scissors.

## 2022-03-01 ENCOUNTER — TELEPHONE (OUTPATIENT)
Dept: HEMATOLOGY/ONCOLOGY | Facility: HOSPITAL | Age: 77
End: 2022-03-01

## 2022-03-01 NOTE — TELEPHONE ENCOUNTER
Spoke with Tor Bustillo, patient's spouse. He feels the fentanyl 75mcg patch is more effective for patient than previously tried 50mcg patch. He would like to stick with the 75mcg patches and resume 30 day refill supplies.     He will follow up with Ria Haynes, when she returns to office

## 2022-03-01 NOTE — TELEPHONE ENCOUNTER
Patients  calling  and has question regarding    fentaNYL 75 MCG/HR Transdermal Patch 72 Hr    Please call Mariella Prescott

## 2022-03-08 ENCOUNTER — TELEPHONE (OUTPATIENT)
Dept: HEMATOLOGY/ONCOLOGY | Facility: HOSPITAL | Age: 77
End: 2022-03-08

## 2022-03-08 NOTE — TELEPHONE ENCOUNTER
Spoke with Shadi Gonzáles and Marilin Orozco. Rx for Fentanyl 75mcg/hr patches will be sent on 3/11/2022. Shadi Gonzáles would like to remain on 75mcg/hr dose at this time- agree with this plan. Shadi Gonzáles wanted to let Dr. Rogelio Hi know that she had 4 teet extracted last Thursday and her dentist said it is ok for her to receive Xgeva. Will forward this message. Shadi Gonzáles and Marilin Orozco appreciative of call and verbalized understanding.

## 2022-03-08 NOTE — TELEPHONE ENCOUNTER
Patients   Huff Maroon calling. Regarding     fentaNYL 75 MCG/HR Transdermal Patch 72 Hr    Stated it was at half dose. But patient is not tolerating --can not wait that ling.     Please return call to Refugio Elmore to discuss

## 2022-03-28 ENCOUNTER — HOSPITAL ENCOUNTER (OUTPATIENT)
Dept: NUCLEAR MEDICINE | Facility: HOSPITAL | Age: 77
Discharge: HOME | End: 2022-03-28
Attending: INTERNAL MEDICINE
Payer: COMMERCIAL

## 2022-03-28 ENCOUNTER — HOSPITAL ENCOUNTER (OUTPATIENT)
Dept: MRI IMAGING | Facility: HOSPITAL | Age: 77
Discharge: HOME | End: 2022-03-28
Attending: INTERNAL MEDICINE
Payer: COMMERCIAL

## 2022-03-28 DIAGNOSIS — C34.32 MALIGNANT NEOPLASM OF LOWER LOBE OF LEFT LUNG (HCC): ICD-10-CM

## 2022-03-28 DIAGNOSIS — C80.1 METASTASIS TO VERTEBRAL COLUMN OF UNKNOWN ORIGIN (HCC): ICD-10-CM

## 2022-03-28 DIAGNOSIS — C79.31 BRAIN METASTASIS (HCC): ICD-10-CM

## 2022-03-28 DIAGNOSIS — C79.51 BONE METASTASIS (HCC): ICD-10-CM

## 2022-03-28 DIAGNOSIS — C79.51 METASTASIS TO VERTEBRAL COLUMN OF UNKNOWN ORIGIN (HCC): ICD-10-CM

## 2022-03-28 LAB
CREAT BLD-MCNC: 0.6 MG/DL
GLUCOSE BLDC GLUCOMTR-MCNC: 109 MG/DL (ref 70–99)

## 2022-03-28 PROCEDURE — 70553 MRI BRAIN STEM W/O & W/DYE: CPT | Performed by: INTERNAL MEDICINE

## 2022-03-28 PROCEDURE — 82962 GLUCOSE BLOOD TEST: CPT

## 2022-03-28 PROCEDURE — 82565 ASSAY OF CREATININE: CPT

## 2022-03-28 PROCEDURE — A9575 INJ GADOTERATE MEGLUMI 0.1ML: HCPCS | Performed by: INTERNAL MEDICINE

## 2022-03-28 PROCEDURE — 78815 PET IMAGE W/CT SKULL-THIGH: CPT | Performed by: INTERNAL MEDICINE

## 2022-03-29 ENCOUNTER — TELEPHONE (OUTPATIENT)
Dept: HEMATOLOGY/ONCOLOGY | Facility: HOSPITAL | Age: 77
End: 2022-03-29

## 2022-03-29 NOTE — TELEPHONE ENCOUNTER
Called pts . PET scan shows progression. Dr Stewart Peralta reviewed she will changing Pat's therapy. Instructed to continue current medication (osimertinib) until new plan in place. Scheduled follow up with Dr Stewart Peralta next week. Pt has had her her teeth extracted. Pt is feeling tired. Her stools have been dark- pt is taking iron tablets. Now she feels weaker in legs before. Denies difficulty moving bowels or urinating. Uses miralax as needed. MRI of brain with no new or worsening metastasis.

## 2022-04-07 RX ORDER — FENTANYL 75 UG/H
1 PATCH TRANSDERMAL
Qty: 10 PATCH | Refills: 0 | Status: SHIPPED | OUTPATIENT
Start: 2022-04-10 | End: 2022-05-10

## 2022-04-08 ENCOUNTER — NURSE ONLY (OUTPATIENT)
Dept: HEMATOLOGY/ONCOLOGY | Facility: HOSPITAL | Age: 77
End: 2022-04-08
Attending: INTERNAL MEDICINE
Payer: COMMERCIAL

## 2022-04-08 ENCOUNTER — TELEPHONE (OUTPATIENT)
Dept: HEMATOLOGY/ONCOLOGY | Facility: HOSPITAL | Age: 77
End: 2022-04-08

## 2022-04-08 VITALS
RESPIRATION RATE: 16 BRPM | WEIGHT: 113.19 LBS | BODY MASS INDEX: 22.22 KG/M2 | OXYGEN SATURATION: 99 % | DIASTOLIC BLOOD PRESSURE: 40 MMHG | TEMPERATURE: 100 F | HEART RATE: 85 BPM | HEIGHT: 60 IN | SYSTOLIC BLOOD PRESSURE: 138 MMHG

## 2022-04-08 DIAGNOSIS — C34.32 MALIGNANT NEOPLASM OF LOWER LOBE OF LEFT LUNG (HCC): ICD-10-CM

## 2022-04-08 DIAGNOSIS — C79.31 BRAIN METASTASIS (HCC): ICD-10-CM

## 2022-04-08 DIAGNOSIS — Z51.81 MEDICATION MONITORING ENCOUNTER: ICD-10-CM

## 2022-04-08 DIAGNOSIS — C79.51 BONE METASTASIS (HCC): ICD-10-CM

## 2022-04-08 DIAGNOSIS — C80.1 METASTASIS TO VERTEBRAL COLUMN OF UNKNOWN ORIGIN (HCC): ICD-10-CM

## 2022-04-08 DIAGNOSIS — C34.32 MALIGNANT NEOPLASM OF LOWER LOBE OF LEFT LUNG (HCC): Primary | ICD-10-CM

## 2022-04-08 DIAGNOSIS — C34.90 PRIMARY MALIGNANT NEOPLASM OF LUNG METASTATIC TO OTHER SITE, UNSPECIFIED LATERALITY (HCC): ICD-10-CM

## 2022-04-08 DIAGNOSIS — Z51.81 ENCOUNTER FOR MEDICATION MONITORING: ICD-10-CM

## 2022-04-08 DIAGNOSIS — C79.51 METASTASIS TO VERTEBRAL COLUMN OF UNKNOWN ORIGIN (HCC): ICD-10-CM

## 2022-04-08 PROBLEM — Z45.2 ENCOUNTER FOR CENTRAL LINE CARE: Status: ACTIVE | Noted: 2022-04-08

## 2022-04-08 PROBLEM — Z45.2 ENCOUNTER FOR CENTRAL LINE CARE: Status: ACTIVE | Noted: 2022-01-01

## 2022-04-08 LAB
ALBUMIN SERPL-MCNC: 3.2 G/DL (ref 3.4–5)
ALBUMIN/GLOB SERPL: 0.9 {RATIO} (ref 1–2)
ALP LIVER SERPL-CCNC: 138 U/L
ALT SERPL-CCNC: 57 U/L
ANION GAP SERPL CALC-SCNC: 5 MMOL/L (ref 0–18)
AST SERPL-CCNC: 16 U/L (ref 15–37)
BASOPHILS # BLD AUTO: 0.07 X10(3) UL (ref 0–0.2)
BASOPHILS NFR BLD AUTO: 0.2 %
BILIRUB SERPL-MCNC: 0.5 MG/DL (ref 0.1–2)
BUN BLD-MCNC: 11 MG/DL (ref 7–18)
BUN/CREAT SERPL: 19.6 (ref 10–20)
CALCIUM BLD-MCNC: 9.2 MG/DL (ref 8.5–10.1)
CHLORIDE SERPL-SCNC: 94 MMOL/L (ref 98–112)
CO2 SERPL-SCNC: 33 MMOL/L (ref 21–32)
CREAT BLD-MCNC: 0.56 MG/DL
DEPRECATED RDW RBC AUTO: 49.1 FL (ref 35.1–46.3)
EOSINOPHIL # BLD AUTO: 1.08 X10(3) UL (ref 0–0.7)
EOSINOPHIL NFR BLD AUTO: 3.5 %
ERYTHROCYTE [DISTWIDTH] IN BLOOD BY AUTOMATED COUNT: 15.9 % (ref 11–15)
GLOBULIN PLAS-MCNC: 3.4 G/DL (ref 2.8–4.4)
GLUCOSE BLD-MCNC: 119 MG/DL (ref 70–99)
HCT VFR BLD AUTO: 24.6 %
HGB BLD-MCNC: 7.7 G/DL
IMM GRANULOCYTES # BLD AUTO: 0.43 X10(3) UL (ref 0–1)
IMM GRANULOCYTES NFR BLD: 1.4 %
LYMPHOCYTES # BLD AUTO: 1.39 X10(3) UL (ref 1–4)
LYMPHOCYTES NFR BLD AUTO: 4.5 %
MAGNESIUM SERPL-MCNC: 2.2 MG/DL (ref 1.6–2.6)
MCH RBC QN AUTO: 26.6 PG (ref 26–34)
MCHC RBC AUTO-ENTMCNC: 31.3 G/DL (ref 31–37)
MCV RBC AUTO: 84.8 FL
MONOCYTES # BLD AUTO: 1.98 X10(3) UL (ref 0.1–1)
MONOCYTES NFR BLD AUTO: 6.4 %
NEUTROPHILS # BLD AUTO: 25.86 X10 (3) UL (ref 1.5–7.7)
NEUTROPHILS # BLD AUTO: 25.86 X10(3) UL (ref 1.5–7.7)
NEUTROPHILS NFR BLD AUTO: 84 %
OSMOLALITY SERPL CALC.SUM OF ELEC: 275 MOSM/KG (ref 275–295)
PLATELET # BLD AUTO: 260 10(3)UL (ref 150–450)
POTASSIUM SERPL-SCNC: 4 MMOL/L (ref 3.5–5.1)
PROT SERPL-MCNC: 6.6 G/DL (ref 6.4–8.2)
RBC # BLD AUTO: 2.9 X10(6)UL
SODIUM SERPL-SCNC: 132 MMOL/L (ref 136–145)
T4 FREE SERPL-MCNC: 1.3 NG/DL (ref 0.8–1.7)
TSI SER-ACNC: 1.13 MIU/ML (ref 0.36–3.74)
WBC # BLD AUTO: 30.8 X10(3) UL (ref 4–11)

## 2022-04-08 PROCEDURE — 85060 BLOOD SMEAR INTERPRETATION: CPT

## 2022-04-08 PROCEDURE — 36415 COLL VENOUS BLD VENIPUNCTURE: CPT

## 2022-04-08 PROCEDURE — 85025 COMPLETE CBC W/AUTO DIFF WBC: CPT

## 2022-04-08 PROCEDURE — 84439 ASSAY OF FREE THYROXINE: CPT

## 2022-04-08 PROCEDURE — 83735 ASSAY OF MAGNESIUM: CPT

## 2022-04-08 PROCEDURE — 80053 COMPREHEN METABOLIC PANEL: CPT

## 2022-04-08 PROCEDURE — 99211 OFF/OP EST MAY X REQ PHY/QHP: CPT

## 2022-04-08 PROCEDURE — 84443 ASSAY THYROID STIM HORMONE: CPT

## 2022-04-08 RX ORDER — HEPARIN SODIUM (PORCINE) LOCK FLUSH IV SOLN 100 UNIT/ML 100 UNIT/ML
5 SOLUTION INTRAVENOUS ONCE
OUTPATIENT
Start: 2022-04-08

## 2022-04-08 RX ORDER — SODIUM CHLORIDE 9 MG/ML
10 INJECTION INTRAVENOUS ONCE
OUTPATIENT
Start: 2022-04-08

## 2022-04-08 RX ORDER — ONDANSETRON 8 MG/1
8 TABLET, ORALLY DISINTEGRATING ORAL EVERY 8 HOURS PRN
Qty: 36 TABLET | Refills: 3 | Status: SHIPPED | OUTPATIENT
Start: 2022-04-08

## 2022-04-08 NOTE — TELEPHONE ENCOUNTER
Yesenia Villalta came up to the  saying him and [facility] had a question for the Dr or nurse. I let them know that they were in clinic with other patients and that I can not pull them out at the moment.  I let them know that I would put in a message and that they would get a call back back at some point to get their questions answered

## 2022-04-08 NOTE — TELEPHONE ENCOUNTER
Patient called and spoke to . HGB 7.7. Informed discussed with Dr. Briana Drew and will repeat CBC on 4/14/22 and may need possible transfusion.  stated stool always black due to patient on iron. Denies active bleeding. Discussed foods high in iron. Continue to monitor blood counts. Spouse verbalizes understanding.

## 2022-04-13 ENCOUNTER — TELEPHONE (OUTPATIENT)
Dept: HEMATOLOGY/ONCOLOGY | Facility: HOSPITAL | Age: 77
End: 2022-04-13

## 2022-04-13 RX ORDER — FUROSEMIDE 20 MG/1
TABLET ORAL
Qty: 90 TABLET | Refills: 0 | Status: SHIPPED | OUTPATIENT
Start: 2022-04-13

## 2022-04-13 RX ORDER — POTASSIUM CHLORIDE 20 MEQ/1
20 TABLET, EXTENDED RELEASE ORAL DAILY
COMMUNITY
Start: 2021-12-11 | End: 2022-04-13

## 2022-04-13 RX ORDER — FUROSEMIDE 20 MG/1
TABLET ORAL
Qty: 90 TABLET | Refills: 0 | OUTPATIENT
Start: 2022-04-13

## 2022-04-13 RX ORDER — POTASSIUM CHLORIDE 20 MEQ/1
20 TABLET, EXTENDED RELEASE ORAL DAILY
Qty: 90 TABLET | Refills: 0 | Status: SHIPPED | OUTPATIENT
Start: 2022-04-13

## 2022-04-13 RX ORDER — POTASSIUM CHLORIDE 20 MEQ/1
TABLET, EXTENDED RELEASE ORAL
Qty: 90 TABLET | Refills: 0 | OUTPATIENT
Start: 2022-04-13

## 2022-04-13 NOTE — TELEPHONE ENCOUNTER
Returned phone call and notified that received letter from dentist and will get patient scheduled for Zometa once authorized. Spouse verbalizes understanding.

## 2022-04-13 NOTE — TELEPHONE ENCOUNTER
Refill sent for furosemide 20 MG Oral Tab and potassium chloride 20 MEQ Oral Tab for a 90 day supply.  All new refills must be sent to new PCP

## 2022-04-13 NOTE — TELEPHONE ENCOUNTER
Patients  calling. He is asking about the medication for her bones.     Please return call to [facility] BRIDGER GAXIOLA

## 2022-04-14 ENCOUNTER — APPOINTMENT (OUTPATIENT)
Dept: HEMATOLOGY/ONCOLOGY | Facility: HOSPITAL | Age: 77
End: 2022-04-14
Attending: NURSE PRACTITIONER
Payer: COMMERCIAL

## 2022-04-14 ENCOUNTER — OFFICE VISIT (OUTPATIENT)
Dept: RADIATION ONCOLOGY | Facility: HOSPITAL | Age: 77
End: 2022-04-14
Attending: RADIOLOGY
Payer: COMMERCIAL

## 2022-04-14 VITALS
DIASTOLIC BLOOD PRESSURE: 43 MMHG | HEART RATE: 85 BPM | TEMPERATURE: 98 F | RESPIRATION RATE: 16 BRPM | SYSTOLIC BLOOD PRESSURE: 130 MMHG | OXYGEN SATURATION: 100 %

## 2022-04-14 VITALS
OXYGEN SATURATION: 100 % | DIASTOLIC BLOOD PRESSURE: 43 MMHG | HEART RATE: 85 BPM | SYSTOLIC BLOOD PRESSURE: 130 MMHG | TEMPERATURE: 98 F | RESPIRATION RATE: 16 BRPM

## 2022-04-14 DIAGNOSIS — E27.8 ADRENAL MASS, LEFT (HCC): ICD-10-CM

## 2022-04-14 DIAGNOSIS — C79.51 BONE METASTASIS (HCC): ICD-10-CM

## 2022-04-14 DIAGNOSIS — C34.32 MALIGNANT NEOPLASM OF LOWER LOBE OF LEFT LUNG (HCC): ICD-10-CM

## 2022-04-14 DIAGNOSIS — C79.31 BRAIN METASTASIS (HCC): ICD-10-CM

## 2022-04-14 DIAGNOSIS — Z51.81 ENCOUNTER FOR MEDICATION MONITORING: ICD-10-CM

## 2022-04-14 DIAGNOSIS — C80.1 METASTASIS TO VERTEBRAL COLUMN OF UNKNOWN ORIGIN (HCC): ICD-10-CM

## 2022-04-14 DIAGNOSIS — C79.51 METASTASIS TO VERTEBRAL COLUMN OF UNKNOWN ORIGIN (HCC): ICD-10-CM

## 2022-04-14 PROBLEM — D64.9 ANEMIA: Status: ACTIVE | Noted: 2022-04-14

## 2022-04-14 PROBLEM — D64.9 ANEMIA: Status: ACTIVE | Noted: 2022-01-01

## 2022-04-14 LAB
ANTIBODY SCREEN: NEGATIVE
BASOPHILS # BLD: 0 X10(3) UL (ref 0–0.2)
BASOPHILS NFR BLD: 0 %
DEPRECATED RDW RBC AUTO: 50.7 FL (ref 35.1–46.3)
EOSINOPHIL # BLD: 1.45 X10(3) UL (ref 0–0.7)
EOSINOPHIL NFR BLD: 4 %
ERYTHROCYTE [DISTWIDTH] IN BLOOD BY AUTOMATED COUNT: 16.4 % (ref 11–15)
HCT VFR BLD AUTO: 21.7 %
HELMET CELLS BLD QL SMEAR: PRESENT
HGB BLD-MCNC: 6.8 G/DL
LYMPHOCYTES NFR BLD: 1.45 X10(3) UL (ref 1–4)
LYMPHOCYTES NFR BLD: 4 %
MCH RBC QN AUTO: 26.6 PG (ref 26–34)
MCHC RBC AUTO-ENTMCNC: 31.3 G/DL (ref 31–37)
MCV RBC AUTO: 84.8 FL
MONOCYTES # BLD: 2.18 X10(3) UL (ref 0.1–1)
MONOCYTES NFR BLD: 6 %
NEUTROPHILS # BLD AUTO: 31.05 X10 (3) UL (ref 1.5–7.7)
NEUTROPHILS NFR BLD: 76 %
NEUTS BAND NFR BLD: 10 %
NEUTS HYPERSEG # BLD: 31.22 X10(3) UL (ref 1.5–7.7)
PLATELET # BLD AUTO: 234 10(3)UL (ref 150–450)
PLATELET MORPHOLOGY: NORMAL
RBC # BLD AUTO: 2.56 X10(6)UL
RH BLOOD TYPE: POSITIVE
TOTAL CELLS COUNTED BLD: 100
WBC # BLD AUTO: 36.3 X10(3) UL (ref 4–11)

## 2022-04-14 PROCEDURE — 85007 BL SMEAR W/DIFF WBC COUNT: CPT

## 2022-04-14 PROCEDURE — 85025 COMPLETE CBC W/AUTO DIFF WBC: CPT

## 2022-04-14 PROCEDURE — 85027 COMPLETE CBC AUTOMATED: CPT

## 2022-04-14 PROCEDURE — 85060 BLOOD SMEAR INTERPRETATION: CPT

## 2022-04-14 PROCEDURE — 86901 BLOOD TYPING SEROLOGIC RH(D): CPT

## 2022-04-14 PROCEDURE — 86850 RBC ANTIBODY SCREEN: CPT

## 2022-04-14 PROCEDURE — 99211 OFF/OP EST MAY X REQ PHY/QHP: CPT

## 2022-04-14 PROCEDURE — 99212 OFFICE O/P EST SF 10 MIN: CPT

## 2022-04-14 PROCEDURE — 36415 COLL VENOUS BLD VENIPUNCTURE: CPT

## 2022-04-14 PROCEDURE — 86900 BLOOD TYPING SEROLOGIC ABO: CPT

## 2022-04-15 ENCOUNTER — OFFICE VISIT (OUTPATIENT)
Dept: HEMATOLOGY/ONCOLOGY | Facility: HOSPITAL | Age: 77
End: 2022-04-15
Attending: NURSE PRACTITIONER
Payer: COMMERCIAL

## 2022-04-15 VITALS
TEMPERATURE: 98 F | RESPIRATION RATE: 16 BRPM | DIASTOLIC BLOOD PRESSURE: 35 MMHG | OXYGEN SATURATION: 98 % | SYSTOLIC BLOOD PRESSURE: 108 MMHG | HEART RATE: 83 BPM

## 2022-04-15 VITALS
DIASTOLIC BLOOD PRESSURE: 45 MMHG | HEART RATE: 80 BPM | TEMPERATURE: 98 F | RESPIRATION RATE: 16 BRPM | SYSTOLIC BLOOD PRESSURE: 120 MMHG | OXYGEN SATURATION: 98 %

## 2022-04-15 DIAGNOSIS — F41.9 ANXIETY: ICD-10-CM

## 2022-04-15 DIAGNOSIS — G89.3 CANCER RELATED PAIN: ICD-10-CM

## 2022-04-15 DIAGNOSIS — F32.A DEPRESSION, UNSPECIFIED DEPRESSION TYPE: ICD-10-CM

## 2022-04-15 DIAGNOSIS — Z71.89 GOALS OF CARE, COUNSELING/DISCUSSION: ICD-10-CM

## 2022-04-15 DIAGNOSIS — R11.0 NAUSEA: ICD-10-CM

## 2022-04-15 DIAGNOSIS — Z51.81 MEDICATION MONITORING ENCOUNTER: ICD-10-CM

## 2022-04-15 DIAGNOSIS — T40.2X5A THERAPEUTIC OPIOID INDUCED CONSTIPATION: ICD-10-CM

## 2022-04-15 DIAGNOSIS — C80.1 METASTASIS TO VERTEBRAL COLUMN OF UNKNOWN ORIGIN (HCC): ICD-10-CM

## 2022-04-15 DIAGNOSIS — D64.9 ANEMIA: ICD-10-CM

## 2022-04-15 DIAGNOSIS — F11.90 CHRONIC, CONTINUOUS USE OF OPIOIDS: ICD-10-CM

## 2022-04-15 DIAGNOSIS — G47.9 DIFFICULTY SLEEPING: ICD-10-CM

## 2022-04-15 DIAGNOSIS — Z71.89 ADVANCE CARE PLANNING: ICD-10-CM

## 2022-04-15 DIAGNOSIS — C34.32 MALIGNANT NEOPLASM OF LOWER LOBE OF LEFT LUNG (HCC): ICD-10-CM

## 2022-04-15 DIAGNOSIS — C79.51 BONE METASTASIS (HCC): ICD-10-CM

## 2022-04-15 DIAGNOSIS — C79.31 BRAIN METASTASIS (HCC): ICD-10-CM

## 2022-04-15 DIAGNOSIS — Z51.5 PALLIATIVE CARE ENCOUNTER: ICD-10-CM

## 2022-04-15 DIAGNOSIS — D63.8 ANEMIA IN OTHER CHRONIC DISEASES CLASSIFIED ELSEWHERE: Primary | ICD-10-CM

## 2022-04-15 DIAGNOSIS — C79.51 METASTASIS TO VERTEBRAL COLUMN OF UNKNOWN ORIGIN (HCC): ICD-10-CM

## 2022-04-15 DIAGNOSIS — K59.03 THERAPEUTIC OPIOID INDUCED CONSTIPATION: ICD-10-CM

## 2022-04-15 DIAGNOSIS — G89.3 CANCER RELATED PAIN: Primary | ICD-10-CM

## 2022-04-15 DIAGNOSIS — Z45.2 ENCOUNTER FOR CENTRAL LINE CARE: ICD-10-CM

## 2022-04-15 PROCEDURE — 99215 OFFICE O/P EST HI 40 MIN: CPT | Performed by: NURSE PRACTITIONER

## 2022-04-15 PROCEDURE — 96365 THER/PROPH/DIAG IV INF INIT: CPT

## 2022-04-15 PROCEDURE — 36430 TRANSFUSION BLD/BLD COMPNT: CPT

## 2022-04-15 PROCEDURE — 99417 PROLNG OP E/M EACH 15 MIN: CPT | Performed by: NURSE PRACTITIONER

## 2022-04-15 RX ORDER — ONDANSETRON 4 MG/1
TABLET, ORALLY DISINTEGRATING ORAL EVERY 8 HOURS PRN
Qty: 90 TABLET | Refills: 0 | Status: SHIPPED | OUTPATIENT
Start: 2022-04-15

## 2022-04-15 RX ORDER — HEPARIN SODIUM (PORCINE) LOCK FLUSH IV SOLN 100 UNIT/ML 100 UNIT/ML
5 SOLUTION INTRAVENOUS ONCE
OUTPATIENT
Start: 2022-05-13

## 2022-04-15 RX ORDER — SODIUM CHLORIDE 9 MG/ML
10 INJECTION INTRAVENOUS ONCE
OUTPATIENT
Start: 2022-05-13

## 2022-04-15 NOTE — PROGRESS NOTES
Bao Gaines arrived to infusion area via wheelchair, accompanied by her spouse. She is here for 2 units of PRBC,s for hgb of 6.8. She does c/o fatigue. Denies any problems with past transfusions. She does have chronic back pain and wears a brace. She took her pain medication this morning and rates her pain at 2 today. PIV placed on third attempt today. 2 units of PRBC's transfused and tolerated well. Zometa also scheduled for today. She denies any dental problems. Reviewed most common side effects and encouraged to drink fluids today. Pt given handout on Zometa from [email or web address].  Zometa infused and tolerated well. PIV removed, no redness or swelling at site. Discharged home with future appointments.

## 2022-04-18 ENCOUNTER — APPOINTMENT (OUTPATIENT)
Dept: RADIATION ONCOLOGY | Facility: HOSPITAL | Age: 77
End: 2022-04-18
Attending: RADIOLOGY
Payer: COMMERCIAL

## 2022-04-19 ENCOUNTER — LAB ENCOUNTER (OUTPATIENT)
Dept: LAB | Facility: HOSPITAL | Age: 77
End: 2022-04-19
Attending: RADIOLOGY
Payer: COMMERCIAL

## 2022-04-19 ENCOUNTER — APPOINTMENT (OUTPATIENT)
Dept: RADIATION ONCOLOGY | Facility: HOSPITAL | Age: 77
End: 2022-04-19
Attending: RADIOLOGY
Payer: COMMERCIAL

## 2022-04-19 DIAGNOSIS — Z01.818 PRE-OP TESTING: ICD-10-CM

## 2022-04-19 LAB — SARS-COV-2 RNA RESP QL NAA+PROBE: NOT DETECTED

## 2022-04-21 ENCOUNTER — HOSPITAL ENCOUNTER (OUTPATIENT)
Dept: INTERVENTIONAL RADIOLOGY/VASCULAR | Facility: HOSPITAL | Age: 77
Discharge: HOME | End: 2022-04-21
Attending: RADIOLOGY | Admitting: RADIOLOGY
Payer: COMMERCIAL

## 2022-04-21 VITALS
RESPIRATION RATE: 15 BRPM | HEART RATE: 93 BPM | TEMPERATURE: 98 F | DIASTOLIC BLOOD PRESSURE: 48 MMHG | SYSTOLIC BLOOD PRESSURE: 110 MMHG | WEIGHT: 110 LBS | BODY MASS INDEX: 21 KG/M2 | OXYGEN SATURATION: 95 %

## 2022-04-21 DIAGNOSIS — C34.32 MALIGNANT NEOPLASM OF LOWER LOBE OF LEFT LUNG (HCC): ICD-10-CM

## 2022-04-21 DIAGNOSIS — Z01.818 PRE-OP TESTING: Primary | ICD-10-CM

## 2022-04-21 PROCEDURE — 02HV33Z INSERTION OF INFUSION DEVICE INTO SUPERIOR VENA CAVA, PERCUTANEOUS APPROACH: ICD-10-PCS | Performed by: RADIOLOGY

## 2022-04-21 PROCEDURE — 77001 FLUOROGUIDE FOR VEIN DEVICE: CPT

## 2022-04-21 PROCEDURE — 0JH60WZ INSERTION OF TOTALLY IMPLANTABLE VASCULAR ACCESS DEVICE INTO CHEST SUBCUTANEOUS TISSUE AND FASCIA, OPEN APPROACH: ICD-10-PCS | Performed by: RADIOLOGY

## 2022-04-21 PROCEDURE — 76937 US GUIDE VASCULAR ACCESS: CPT

## 2022-04-21 PROCEDURE — 99152 MOD SED SAME PHYS/QHP 5/>YRS: CPT

## 2022-04-21 PROCEDURE — 36415 COLL VENOUS BLD VENIPUNCTURE: CPT

## 2022-04-21 PROCEDURE — 36561 INSERT TUNNELED CV CATH: CPT

## 2022-04-21 RX ORDER — HEPARIN SODIUM (PORCINE) LOCK FLUSH IV SOLN 100 UNIT/ML 100 UNIT/ML
SOLUTION INTRAVENOUS
Status: COMPLETED
Start: 2022-04-21 | End: 2022-04-21

## 2022-04-21 RX ORDER — SODIUM CHLORIDE 9 MG/ML
INJECTION, SOLUTION INTRAVENOUS CONTINUOUS
Status: DISCONTINUED | OUTPATIENT
Start: 2022-04-21 | End: 2022-04-21

## 2022-04-21 RX ORDER — CEFAZOLIN SODIUM/WATER 2 G/20 ML
SYRINGE (ML) INTRAVENOUS
Status: COMPLETED
Start: 2022-04-21 | End: 2022-04-21

## 2022-04-21 RX ORDER — MIDAZOLAM HYDROCHLORIDE 1 MG/ML
INJECTION INTRAMUSCULAR; INTRAVENOUS
Status: COMPLETED
Start: 2022-04-21 | End: 2022-04-21

## 2022-04-21 RX ORDER — HEPARIN SODIUM 1000 [USP'U]/ML
INJECTION, SOLUTION INTRAVENOUS; SUBCUTANEOUS
Status: COMPLETED
Start: 2022-04-21 | End: 2022-04-21

## 2022-04-21 RX ADMIN — SODIUM CHLORIDE: 9 INJECTION, SOLUTION INTRAVENOUS at 08:45:00

## 2022-04-21 NOTE — IVS NOTE
DISCHARGE NOTE     Pt is able to sit up and ambulate without difficulty. Pt voided and tolerated fluids and food. No nausea or vomiting noted. Procedural site remains dry and intact with good circulation, motion, and sensation. No signs and symptoms of bleeding/hematoma noted. IV access removed. Instruction provided, patient/family verbalizes understanding. Dr. Kel Henry spoke with patient/family post procedure.    Pt discharge via wheelchair to [facility]

## 2022-04-22 ENCOUNTER — APPOINTMENT (OUTPATIENT)
Dept: RADIATION ONCOLOGY | Facility: HOSPITAL | Age: 77
End: 2022-04-22
Attending: RADIOLOGY
Payer: COMMERCIAL

## 2022-04-22 NOTE — PROGRESS NOTES
[facility] Radiation Treatment Management Note 1-5    Patient:  Fernando Espinoza  Age:  68year old  Visit Diagnosis:  No diagnosis found. Primary Rad/Onc:  Dr. Lauryn Silvestre    Site Delivered Dose (cGy) Prescribed Dose (cGy) Fraction #   T9-L3 Spine 800 2000 2/5           First treatment date:  4/22/2022  Concurrent chemotherapy:  None    Oncology Vitals 4/21/2022 4/21/2022 4/21/2022   Weight - - -   Weight - - -   BSA (m2) - - -   /62 123/56 110/48   Pulse 81 87 93   Resp 23 19 15   Temp - - -   SpO2 97 100 95   Some recent data might be hidden        Toxicities:  Fatigue Grade 1= Fatigue relieved by rest  Bone pain Grade 1= Mild pain  Gait disturbance Grade 2= Moderate change in gait; assistive device indicated; limiting instrumental ADL, pt using wheelchair today  Muscle weakness Grade 2= Symptomatic; evident on physical exam; limiting instrumental ADL, pt using wheelchair today       Nursing Note:  Pt seen for OTV with Dr. Rudy Jay. AVS given to patient, pt to follow up PRN. Lidocaine patch on back, fentanyl patch on L arm. Pt using morphine as needed every 4-6 hours, usually about TID. Tylenol PRN as well. Davion Paulino, RN    Physician Note:  Subjective:      Objective:      Treatment setup imaging have been reviewed:   Yes    Assessment/Plan:    ***    Continue radiotherapy per plan    Next visit:  1 week    {[facility]QU[facility] RAD ONC MD NAMES:4664} None

## 2022-04-22 NOTE — INTERVAL H&P NOTE
The above referenced H&P was reviewed by Kofi Turner MD on 4/22/2022, the patient was examined and no significant changes have occurred in the patient's condition since the H&P was performed. Risks, benefits, alternative treatments and consequences of no treatment were discussed. We will proceed with procedure as planned.       Kofi Turner MD  4/22/2022  1:24 PM

## 2022-04-25 ENCOUNTER — OFFICE VISIT (OUTPATIENT)
Dept: RADIATION ONCOLOGY | Facility: HOSPITAL | Age: 77
End: 2022-04-25
Attending: RADIOLOGY
Payer: COMMERCIAL

## 2022-04-25 ENCOUNTER — TELEPHONE (OUTPATIENT)
Dept: HEMATOLOGY/ONCOLOGY | Facility: HOSPITAL | Age: 77
End: 2022-04-25

## 2022-04-25 VITALS
DIASTOLIC BLOOD PRESSURE: 43 MMHG | SYSTOLIC BLOOD PRESSURE: 116 MMHG | HEART RATE: 84 BPM | RESPIRATION RATE: 16 BRPM | TEMPERATURE: 98 F | OXYGEN SATURATION: 100 %

## 2022-04-26 ENCOUNTER — NURSE ONLY (OUTPATIENT)
Dept: HEMATOLOGY/ONCOLOGY | Facility: HOSPITAL | Age: 77
End: 2022-04-26
Attending: INTERNAL MEDICINE
Payer: COMMERCIAL

## 2022-04-26 ENCOUNTER — HOME HEALTH CHARGES (OUTPATIENT)
Dept: INTERNAL MEDICINE CLINIC | Facility: CLINIC | Age: 77
End: 2022-04-26

## 2022-04-26 ENCOUNTER — TELEPHONE (OUTPATIENT)
Dept: HEMATOLOGY/ONCOLOGY | Facility: HOSPITAL | Age: 77
End: 2022-04-26

## 2022-04-26 DIAGNOSIS — Z51.81 MEDICATION MONITORING ENCOUNTER: ICD-10-CM

## 2022-04-26 DIAGNOSIS — C79.51 METASTASIS TO VERTEBRAL COLUMN OF UNKNOWN ORIGIN (HCC): Primary | ICD-10-CM

## 2022-04-26 DIAGNOSIS — Z45.2 ENCOUNTER FOR CENTRAL LINE CARE: ICD-10-CM

## 2022-04-26 DIAGNOSIS — C79.31 BRAIN METASTASIS (HCC): ICD-10-CM

## 2022-04-26 DIAGNOSIS — C79.51 BONE METASTASIS (HCC): ICD-10-CM

## 2022-04-26 DIAGNOSIS — C34.90 PRIMARY MALIGNANT NEOPLASM OF LUNG METASTATIC TO OTHER SITE, UNSPECIFIED LATERALITY (HCC): ICD-10-CM

## 2022-04-26 DIAGNOSIS — D64.9 ANEMIA: ICD-10-CM

## 2022-04-26 DIAGNOSIS — D63.8 ANEMIA IN OTHER CHRONIC DISEASES CLASSIFIED ELSEWHERE: ICD-10-CM

## 2022-04-26 DIAGNOSIS — C80.1 METASTASIS TO VERTEBRAL COLUMN OF UNKNOWN ORIGIN (HCC): Primary | ICD-10-CM

## 2022-04-26 DIAGNOSIS — G89.3 CANCER RELATED PAIN: ICD-10-CM

## 2022-04-26 DIAGNOSIS — C34.32 MALIGNANT NEOPLASM OF LOWER LOBE OF LEFT LUNG (HCC): ICD-10-CM

## 2022-04-26 LAB
ANTIBODY SCREEN: NEGATIVE
BASOPHILS # BLD: 0 X10(3) UL (ref 0–0.2)
BASOPHILS NFR BLD: 0 %
DEPRECATED RDW RBC AUTO: 51.4 FL (ref 35.1–46.3)
EOSINOPHIL # BLD: 0.41 X10(3) UL (ref 0–0.7)
EOSINOPHIL NFR BLD: 1 %
ERYTHROCYTE [DISTWIDTH] IN BLOOD BY AUTOMATED COUNT: 16.7 % (ref 11–15)
HCT VFR BLD AUTO: 24.4 %
HGB BLD-MCNC: 7.9 G/DL
LYMPHOCYTES NFR BLD: 0.41 X10(3) UL (ref 1–4)
LYMPHOCYTES NFR BLD: 1 %
MCH RBC QN AUTO: 27.2 PG (ref 26–34)
MCHC RBC AUTO-ENTMCNC: 32.4 G/DL (ref 31–37)
MCV RBC AUTO: 84.1 FL
METAMYELOCYTES # BLD: 0.41 X10(3) UL
METAMYELOCYTES NFR BLD: 1 %
MONOCYTES # BLD: 1.64 X10(3) UL (ref 0.1–1)
MONOCYTES NFR BLD: 4 %
NEUTROPHILS # BLD AUTO: 36.52 X10 (3) UL (ref 1.5–7.7)
NEUTROPHILS NFR BLD: 60 %
NEUTS BAND NFR BLD: 33 %
NEUTS HYPERSEG # BLD: 38.13 X10(3) UL (ref 1.5–7.7)
PLATELET # BLD AUTO: 164 10(3)UL (ref 150–450)
PLATELET MORPHOLOGY: NORMAL
RBC # BLD AUTO: 2.9 X10(6)UL
RH BLOOD TYPE: POSITIVE
TOTAL CELLS COUNTED BLD: 100
WBC # BLD AUTO: 41 X10(3) UL (ref 4–11)

## 2022-04-26 PROCEDURE — 85027 COMPLETE CBC AUTOMATED: CPT

## 2022-04-26 PROCEDURE — 86900 BLOOD TYPING SEROLOGIC ABO: CPT

## 2022-04-26 PROCEDURE — 85025 COMPLETE CBC W/AUTO DIFF WBC: CPT

## 2022-04-26 PROCEDURE — 36591 DRAW BLOOD OFF VENOUS DEVICE: CPT

## 2022-04-26 PROCEDURE — 86901 BLOOD TYPING SEROLOGIC RH(D): CPT

## 2022-04-26 PROCEDURE — 86850 RBC ANTIBODY SCREEN: CPT

## 2022-04-26 PROCEDURE — 85007 BL SMEAR W/DIFF WBC COUNT: CPT

## 2022-04-26 RX ORDER — HEPARIN SODIUM (PORCINE) LOCK FLUSH IV SOLN 100 UNIT/ML 100 UNIT/ML
5 SOLUTION INTRAVENOUS ONCE
OUTPATIENT
Start: 2022-05-10

## 2022-04-26 RX ORDER — HEPARIN SODIUM (PORCINE) LOCK FLUSH IV SOLN 100 UNIT/ML 100 UNIT/ML
5 SOLUTION INTRAVENOUS ONCE
Status: COMPLETED | OUTPATIENT
Start: 2022-04-26 | End: 2022-04-26

## 2022-04-26 RX ORDER — SODIUM CHLORIDE 9 MG/ML
10 INJECTION INTRAVENOUS ONCE
OUTPATIENT
Start: 2022-05-10

## 2022-04-26 RX ADMIN — HEPARIN SODIUM (PORCINE) LOCK FLUSH IV SOLN 100 UNIT/ML 500 UNITS: 100 SOLUTION INTRAVENOUS at 12:45:00

## 2022-04-26 NOTE — TELEPHONE ENCOUNTER
Spouse called. Per spouse patient has been feeling weak and tired. Stools dark from iron but, no active bleeding and no change in color. Dr. Sadaf Berman updated. Post RT will draw CBC and T+C. Continue to monitor labs. Follow up Yuri Bell appointment also scheduled 5/4 post RT. Spouse verbalizes understanding.

## 2022-04-26 NOTE — TELEPHONE ENCOUNTER
Saw Karlos Dickson in the hallway after Pat's radiation appointment. Karlos Dickson is asking if a CBC can be ordered for Kaye Shane to check her Hgb. He states that Kaye Shane is tired, pale, and having \"dark, not black\" stools (he attributes this to iron supplement). I let him know I would forward on to Onc team for input.

## 2022-04-26 NOTE — TELEPHONE ENCOUNTER
Patient called and spoke to spouse. Notified per Dr. Marline Chandler request HGB 7.9. WBC count increased to 41. Spouse denies patient having fevers. Patient having burning with urination and urine darker in color per Gloria Valente. UA and urine culture ordered per Dr. Manjit Stack to be done tomorrow post RT. Instructed to push fluids and call if develops a fever. Spouse verbalizes understanding.

## 2022-04-27 ENCOUNTER — NURSE ONLY (OUTPATIENT)
Dept: HEMATOLOGY/ONCOLOGY | Facility: HOSPITAL | Age: 77
End: 2022-04-27
Attending: NURSE PRACTITIONER
Payer: COMMERCIAL

## 2022-04-27 DIAGNOSIS — R30.0 BURNING WITH URINATION: ICD-10-CM

## 2022-04-27 LAB
BILIRUB UR QL: NEGATIVE
COLOR UR: YELLOW
GLUCOSE UR-MCNC: NEGATIVE MG/DL
HGB UR QL STRIP.AUTO: NEGATIVE
KETONES UR-MCNC: NEGATIVE MG/DL
LEUKOCYTE ESTERASE UR QL STRIP.AUTO: NEGATIVE
NITRITE UR QL STRIP.AUTO: NEGATIVE
PH UR: 5 [PH] (ref 5–8)
PROT UR-MCNC: 30 MG/DL
SP GR UR STRIP: 1.02 (ref 1–1.03)
UROBILINOGEN UR STRIP-ACNC: <2
VIT C UR-MCNC: NEGATIVE MG/DL

## 2022-04-27 PROCEDURE — 87086 URINE CULTURE/COLONY COUNT: CPT

## 2022-04-27 PROCEDURE — 81001 URINALYSIS AUTO W/SCOPE: CPT

## 2022-04-27 PROCEDURE — 99211 OFF/OP EST MAY X REQ PHY/QHP: CPT

## 2022-04-28 ENCOUNTER — TELEPHONE (OUTPATIENT)
Dept: RADIATION ONCOLOGY | Facility: HOSPITAL | Age: 77
End: 2022-04-28

## 2022-04-28 ENCOUNTER — TELEPHONE (OUTPATIENT)
Dept: INTERNAL MEDICINE CLINIC | Facility: CLINIC | Age: 77
End: 2022-04-28

## 2022-04-28 NOTE — TELEPHONE ENCOUNTER
Pt called requesting refill for mupirocin 2 % External Ointment  Pt aware that she needs to be seen but for now pt is under cancer treatment and will wait a little bit to sched an appt with Dr Felipe Bryan

## 2022-04-28 NOTE — TELEPHONE ENCOUNTER
Spoke to  Rosalia Teresa, will send RX to pharmacy [facility] in [facility]. Advised to follow-up with new PCP of choosing to ensure further refills.  verbalized understanding.

## 2022-04-28 NOTE — TELEPHONE ENCOUNTER
RN received a call from patients . Patient will be missing RT today, patient not up for coming today. Last day of RT re-scheduled for tomorrow at [phone]. Therapist and Dr. Elizabeth Koenig made aware.

## 2022-04-29 NOTE — PROGRESS NOTES
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 She has been on osimertinib for her EGFR + tumor, with excellent initial response, but now with progression of dz in the spine with symptomatic pain. MRI of brain reviewed stable dz. She is on long/short acting pain medications and does have significant breakthrough pain. Pt is a candidate for chemo immunotherapy. Various management options were discussed and pt opted for a course of XRT as outlined below. SUMMARY OF RADIATION THERAPY  Treatment Summary: Course: 2 T&L Spine    Treatment Site Energy Dose/Fx (cGy) #Fx Dose Correction (cGy) Total Dose (cGy) Start Date End Date Elapsed Days   T9-L3 Spine 10X/18X 400 5 / 5 0 2,000 4/22/2022 4/29/2022 7   Total:     2,000 4/22/2022 4/29/2022 7       Treatment Technique:  AP/PA    Overall, pt tolerated treatment well and the dose was delivered as planned. At baseline, the pt has short term memory issues, possibly related to her history of multiple brain mets. Pt otherwise did not have new neuro symptoms and was controlled. Patient was able to walk with walker and will continue bowel regimen as she is short of long-term antibiotics. Ideally, patient will be able to tolerate further systemic therapy per Dr. Jim Noe. Thank you very much for allowing us to take care of your patient. Vick Lancaster MD, [address] [place] [address] [email or web address]  [phone]

## 2022-05-04 ENCOUNTER — OFFICE VISIT (OUTPATIENT)
Dept: HEMATOLOGY/ONCOLOGY | Facility: HOSPITAL | Age: 77
End: 2022-05-04
Attending: INTERNAL MEDICINE
Payer: COMMERCIAL

## 2022-05-04 VITALS
RESPIRATION RATE: 16 BRPM | HEART RATE: 94 BPM | WEIGHT: 109 LBS | SYSTOLIC BLOOD PRESSURE: 128 MMHG | TEMPERATURE: 99 F | DIASTOLIC BLOOD PRESSURE: 49 MMHG | OXYGEN SATURATION: 99 % | BODY MASS INDEX: 19.31 KG/M2 | HEIGHT: 63 IN

## 2022-05-04 VITALS
HEART RATE: 79 BPM | OXYGEN SATURATION: 100 % | WEIGHT: 109 LBS | BODY MASS INDEX: 19 KG/M2 | RESPIRATION RATE: 16 BRPM | SYSTOLIC BLOOD PRESSURE: 134 MMHG | TEMPERATURE: 99 F | DIASTOLIC BLOOD PRESSURE: 47 MMHG

## 2022-05-04 DIAGNOSIS — Z51.81 MEDICATION MONITORING ENCOUNTER: ICD-10-CM

## 2022-05-04 DIAGNOSIS — C80.1 METASTASIS TO VERTEBRAL COLUMN OF UNKNOWN ORIGIN (HCC): ICD-10-CM

## 2022-05-04 DIAGNOSIS — Z45.2 ENCOUNTER FOR CENTRAL LINE CARE: ICD-10-CM

## 2022-05-04 DIAGNOSIS — C79.51 BONE METASTASIS (HCC): ICD-10-CM

## 2022-05-04 DIAGNOSIS — R29.898 WEAKNESS OF BOTH LOWER EXTREMITIES: ICD-10-CM

## 2022-05-04 DIAGNOSIS — C79.31 BRAIN METASTASIS (HCC): ICD-10-CM

## 2022-05-04 DIAGNOSIS — C79.51 BONE METASTASIS (HCC): Primary | ICD-10-CM

## 2022-05-04 DIAGNOSIS — D63.8 ANEMIA IN OTHER CHRONIC DISEASES CLASSIFIED ELSEWHERE: ICD-10-CM

## 2022-05-04 DIAGNOSIS — C34.32 MALIGNANT NEOPLASM OF LOWER LOBE OF LEFT LUNG (HCC): Primary | ICD-10-CM

## 2022-05-04 DIAGNOSIS — C79.51 METASTASIS TO VERTEBRAL COLUMN OF UNKNOWN ORIGIN (HCC): ICD-10-CM

## 2022-05-04 DIAGNOSIS — C34.32 MALIGNANT NEOPLASM OF LOWER LOBE OF LEFT LUNG (HCC): ICD-10-CM

## 2022-05-04 DIAGNOSIS — Z09 CHEMOTHERAPY FOLLOW-UP EXAMINATION: ICD-10-CM

## 2022-05-04 DIAGNOSIS — C80.1 METASTASIS TO VERTEBRAL COLUMN OF UNKNOWN ORIGIN (HCC): Primary | ICD-10-CM

## 2022-05-04 DIAGNOSIS — C34.90 PRIMARY MALIGNANT NEOPLASM OF LUNG METASTATIC TO OTHER SITE, UNSPECIFIED LATERALITY (HCC): ICD-10-CM

## 2022-05-04 DIAGNOSIS — D64.9 ANEMIA: ICD-10-CM

## 2022-05-04 DIAGNOSIS — C79.51 METASTASIS TO VERTEBRAL COLUMN OF UNKNOWN ORIGIN (HCC): Primary | ICD-10-CM

## 2022-05-04 DIAGNOSIS — G89.3 CANCER RELATED PAIN: ICD-10-CM

## 2022-05-04 LAB
ALBUMIN SERPL-MCNC: 2.5 G/DL (ref 3.4–5)
ALBUMIN/GLOB SERPL: 0.7 {RATIO} (ref 1–2)
ALP LIVER SERPL-CCNC: 157 U/L
ALT SERPL-CCNC: 72 U/L
ANION GAP SERPL CALC-SCNC: 5 MMOL/L (ref 0–18)
ANTIBODY SCREEN: NEGATIVE
AST SERPL-CCNC: 18 U/L (ref 15–37)
BASOPHILS # BLD AUTO: 0.05 X10(3) UL (ref 0–0.2)
BASOPHILS NFR BLD AUTO: 0.2 %
BILIRUB SERPL-MCNC: 0.5 MG/DL (ref 0.1–2)
BUN BLD-MCNC: 14 MG/DL (ref 7–18)
BUN/CREAT SERPL: 30.4 (ref 10–20)
CALCIUM BLD-MCNC: 8.4 MG/DL (ref 8.5–10.1)
CHLORIDE SERPL-SCNC: 96 MMOL/L (ref 98–112)
CO2 SERPL-SCNC: 28 MMOL/L (ref 21–32)
CREAT BLD-MCNC: 0.46 MG/DL
DEPRECATED RDW RBC AUTO: 52.5 FL (ref 35.1–46.3)
EOSINOPHIL # BLD AUTO: 0.01 X10(3) UL (ref 0–0.7)
EOSINOPHIL NFR BLD AUTO: 0 %
ERYTHROCYTE [DISTWIDTH] IN BLOOD BY AUTOMATED COUNT: 16.8 % (ref 11–15)
GLOBULIN PLAS-MCNC: 3.5 G/DL (ref 2.8–4.4)
GLUCOSE BLD-MCNC: 119 MG/DL (ref 70–99)
HCT VFR BLD AUTO: 20.5 %
HGB BLD-MCNC: 6.4 G/DL
IMM GRANULOCYTES # BLD AUTO: 0.48 X10(3) UL (ref 0–1)
IMM GRANULOCYTES NFR BLD: 1.7 %
LYMPHOCYTES # BLD AUTO: 0.39 X10(3) UL (ref 1–4)
LYMPHOCYTES NFR BLD AUTO: 1.4 %
MCH RBC QN AUTO: 26.9 PG (ref 26–34)
MCHC RBC AUTO-ENTMCNC: 31.2 G/DL (ref 31–37)
MCV RBC AUTO: 86.1 FL
MONOCYTES # BLD AUTO: 1.74 X10(3) UL (ref 0.1–1)
MONOCYTES NFR BLD AUTO: 6.3 %
NEUTROPHILS # BLD AUTO: 24.81 X10 (3) UL (ref 1.5–7.7)
NEUTROPHILS # BLD AUTO: 24.81 X10(3) UL (ref 1.5–7.7)
NEUTROPHILS NFR BLD AUTO: 90.4 %
OSMOLALITY SERPL CALC.SUM OF ELEC: 270 MOSM/KG (ref 275–295)
PLATELET # BLD AUTO: 144 10(3)UL (ref 150–450)
POTASSIUM SERPL-SCNC: 4.4 MMOL/L (ref 3.5–5.1)
PROT SERPL-MCNC: 6 G/DL (ref 6.4–8.2)
RBC # BLD AUTO: 2.38 X10(6)UL
RH BLOOD TYPE: POSITIVE
SODIUM SERPL-SCNC: 129 MMOL/L (ref 136–145)
WBC # BLD AUTO: 27.5 X10(3) UL (ref 4–11)

## 2022-05-04 PROCEDURE — 36430 TRANSFUSION BLD/BLD COMPNT: CPT

## 2022-05-04 PROCEDURE — 86901 BLOOD TYPING SEROLOGIC RH(D): CPT

## 2022-05-04 PROCEDURE — 99215 OFFICE O/P EST HI 40 MIN: CPT | Performed by: INTERNAL MEDICINE

## 2022-05-04 PROCEDURE — 86920 COMPATIBILITY TEST SPIN: CPT

## 2022-05-04 PROCEDURE — 86900 BLOOD TYPING SEROLOGIC ABO: CPT

## 2022-05-04 PROCEDURE — 86850 RBC ANTIBODY SCREEN: CPT

## 2022-05-04 PROCEDURE — 85025 COMPLETE CBC W/AUTO DIFF WBC: CPT

## 2022-05-04 PROCEDURE — 80053 COMPREHEN METABOLIC PANEL: CPT

## 2022-05-04 RX ORDER — HEPARIN SODIUM (PORCINE) LOCK FLUSH IV SOLN 100 UNIT/ML 100 UNIT/ML
SOLUTION INTRAVENOUS
Status: COMPLETED
Start: 2022-05-04 | End: 2022-05-04

## 2022-05-04 RX ORDER — ONDANSETRON 8 MG/1
8 TABLET, ORALLY DISINTEGRATING ORAL EVERY 8 HOURS PRN
Qty: 36 TABLET | Refills: 2 | Status: ON HOLD | OUTPATIENT
Start: 2022-05-04

## 2022-05-04 RX ORDER — HEPARIN SODIUM (PORCINE) LOCK FLUSH IV SOLN 100 UNIT/ML 100 UNIT/ML
5 SOLUTION INTRAVENOUS ONCE
Status: CANCELLED | OUTPATIENT
Start: 2022-05-10

## 2022-05-04 RX ORDER — PROCHLORPERAZINE MALEATE 10 MG
10 TABLET ORAL EVERY 6 HOURS PRN
Qty: 90 TABLET | Refills: 3 | Status: ON HOLD | OUTPATIENT
Start: 2022-05-04

## 2022-05-04 RX ORDER — LORAZEPAM 0.5 MG/1
0.5 TABLET ORAL
Qty: 30 TABLET | Refills: 3 | Status: ON HOLD | OUTPATIENT
Start: 2022-05-04

## 2022-05-04 RX ORDER — SODIUM CHLORIDE 9 MG/ML
10 INJECTION INTRAVENOUS ONCE
OUTPATIENT
Start: 2022-05-10

## 2022-05-04 RX ORDER — HEPARIN SODIUM (PORCINE) LOCK FLUSH IV SOLN 100 UNIT/ML 100 UNIT/ML
5 SOLUTION INTRAVENOUS ONCE
Status: COMPLETED | OUTPATIENT
Start: 2022-05-04 | End: 2022-05-04

## 2022-05-04 RX ADMIN — HEPARIN SODIUM (PORCINE) LOCK FLUSH IV SOLN 100 UNIT/ML 500 UNITS: 100 SOLUTION INTRAVENOUS at 16:50:00

## 2022-05-04 RX ADMIN — HEPARIN SODIUM (PORCINE) LOCK FLUSH IV SOLN 100 UNIT/ML 500 UNITS: 100 SOLUTION INTRAVENOUS at 09:28:00

## 2022-05-04 NOTE — PROGRESS NOTES
Pt here for blood transfusion. To receive 1 unit. Lab Results   Component Value Date    HGB 6.4 (LL) 05/04/2022    HCT 20.5 (L) 05/04/2022     Appeared to tolerate treatment well. No S/S of adverse rxn noted at this time. Discharged from infusion, ambulating independently w/ steady gait.

## 2022-05-05 LAB — BLOOD TYPE BARCODE: 7300

## 2022-05-06 ENCOUNTER — HOSPITAL ENCOUNTER (INPATIENT)
Facility: HOSPITAL | Age: 77
LOS: 8 days | Discharge: HOSPICE | End: 2022-05-16
Attending: EMERGENCY MEDICINE | Admitting: HOSPITALIST
Payer: MEDICARE

## 2022-05-06 ENCOUNTER — OFFICE VISIT (OUTPATIENT)
Dept: HEMATOLOGY/ONCOLOGY | Facility: HOSPITAL | Age: 77
End: 2022-05-06
Attending: NURSE PRACTITIONER
Payer: COMMERCIAL

## 2022-05-06 ENCOUNTER — APPOINTMENT (OUTPATIENT)
Dept: ULTRASOUND IMAGING | Facility: HOSPITAL | Age: 77
End: 2022-05-06
Attending: EMERGENCY MEDICINE
Payer: MEDICARE

## 2022-05-06 ENCOUNTER — NURSE ONLY (OUTPATIENT)
Dept: HEMATOLOGY/ONCOLOGY | Facility: HOSPITAL | Age: 77
End: 2022-05-06
Attending: INTERNAL MEDICINE
Payer: COMMERCIAL

## 2022-05-06 VITALS
HEART RATE: 79 BPM | DIASTOLIC BLOOD PRESSURE: 63 MMHG | RESPIRATION RATE: 16 BRPM | SYSTOLIC BLOOD PRESSURE: 138 MMHG | TEMPERATURE: 98 F | OXYGEN SATURATION: 100 %

## 2022-05-06 VITALS
SYSTOLIC BLOOD PRESSURE: 126 MMHG | RESPIRATION RATE: 16 BRPM | DIASTOLIC BLOOD PRESSURE: 61 MMHG | HEART RATE: 83 BPM | TEMPERATURE: 99 F | OXYGEN SATURATION: 100 %

## 2022-05-06 DIAGNOSIS — R29.898 WEAKNESS OF BOTH LOWER EXTREMITIES: ICD-10-CM

## 2022-05-06 DIAGNOSIS — C34.90 PRIMARY MALIGNANT NEOPLASM OF LUNG METASTATIC TO OTHER SITE, UNSPECIFIED LATERALITY (HCC): ICD-10-CM

## 2022-05-06 DIAGNOSIS — Z51.5 PALLIATIVE CARE ENCOUNTER: ICD-10-CM

## 2022-05-06 DIAGNOSIS — C79.51 METASTASIS TO VERTEBRAL COLUMN OF UNKNOWN ORIGIN (HCC): ICD-10-CM

## 2022-05-06 DIAGNOSIS — C34.32 MALIGNANT NEOPLASM OF LOWER LOBE OF LEFT LUNG (HCC): Primary | ICD-10-CM

## 2022-05-06 DIAGNOSIS — F32.A DEPRESSION, UNSPECIFIED DEPRESSION TYPE: ICD-10-CM

## 2022-05-06 DIAGNOSIS — C34.32 MALIGNANT NEOPLASM OF LOWER LOBE OF LEFT LUNG (HCC): ICD-10-CM

## 2022-05-06 DIAGNOSIS — Z45.2 ENCOUNTER FOR CENTRAL LINE CARE: ICD-10-CM

## 2022-05-06 DIAGNOSIS — T40.2X5A THERAPEUTIC OPIOID INDUCED CONSTIPATION: ICD-10-CM

## 2022-05-06 DIAGNOSIS — F41.9 ANXIETY: ICD-10-CM

## 2022-05-06 DIAGNOSIS — Z71.89 ADVANCE CARE PLANNING: ICD-10-CM

## 2022-05-06 DIAGNOSIS — D63.8 ANEMIA IN OTHER CHRONIC DISEASES CLASSIFIED ELSEWHERE: ICD-10-CM

## 2022-05-06 DIAGNOSIS — F11.90 CHRONIC, CONTINUOUS USE OF OPIOIDS: ICD-10-CM

## 2022-05-06 DIAGNOSIS — G89.3 CANCER RELATED PAIN: Primary | ICD-10-CM

## 2022-05-06 DIAGNOSIS — K59.03 THERAPEUTIC OPIOID INDUCED CONSTIPATION: ICD-10-CM

## 2022-05-06 DIAGNOSIS — R11.0 NAUSEA: ICD-10-CM

## 2022-05-06 DIAGNOSIS — G89.3 CANCER RELATED PAIN: ICD-10-CM

## 2022-05-06 DIAGNOSIS — Z51.81 MEDICATION MONITORING ENCOUNTER: ICD-10-CM

## 2022-05-06 DIAGNOSIS — C79.51 BONE METASTASIS (HCC): ICD-10-CM

## 2022-05-06 DIAGNOSIS — C79.31 BRAIN METASTASIS (HCC): ICD-10-CM

## 2022-05-06 DIAGNOSIS — Z71.89 GOALS OF CARE, COUNSELING/DISCUSSION: ICD-10-CM

## 2022-05-06 DIAGNOSIS — C80.1 METASTASIS TO VERTEBRAL COLUMN OF UNKNOWN ORIGIN (HCC): ICD-10-CM

## 2022-05-06 DIAGNOSIS — R23.8 SKIN BREAKDOWN: ICD-10-CM

## 2022-05-06 PROCEDURE — 96413 CHEMO IV INFUSION 1 HR: CPT

## 2022-05-06 PROCEDURE — 99215 OFFICE O/P EST HI 40 MIN: CPT | Performed by: NURSE PRACTITIONER

## 2022-05-06 PROCEDURE — 36430 TRANSFUSION BLD/BLD COMPNT: CPT

## 2022-05-06 RX ORDER — HEPARIN SODIUM (PORCINE) LOCK FLUSH IV SOLN 100 UNIT/ML 100 UNIT/ML
5 SOLUTION INTRAVENOUS ONCE
Status: COMPLETED | OUTPATIENT
Start: 2022-05-06 | End: 2022-05-06

## 2022-05-06 RX ORDER — HEPARIN SODIUM (PORCINE) LOCK FLUSH IV SOLN 100 UNIT/ML 100 UNIT/ML
5 SOLUTION INTRAVENOUS ONCE
OUTPATIENT
Start: 2022-05-13

## 2022-05-06 RX ORDER — HEPARIN SODIUM (PORCINE) LOCK FLUSH IV SOLN 100 UNIT/ML 100 UNIT/ML
SOLUTION INTRAVENOUS
Status: COMPLETED
Start: 2022-05-06 | End: 2022-05-06

## 2022-05-06 RX ORDER — SODIUM CHLORIDE 9 MG/ML
10 INJECTION INTRAVENOUS ONCE
OUTPATIENT
Start: 2022-05-13

## 2022-05-06 RX ADMIN — HEPARIN SODIUM (PORCINE) LOCK FLUSH IV SOLN 100 UNIT/ML 500 UNITS: 100 SOLUTION INTRAVENOUS at 11:08:00

## 2022-05-06 NOTE — PROGRESS NOTES
Pt here for C1D1 Keytruda. Arrives Via wheelchair, accompanied by Spouse           Modifications in dose or schedule: No, patient is currently only getting [place] Patient will receive one unit of PRBC today. Drugs/infusion dual verified for appearance and physical integrity: yes    Patient denies possible pregnancy since last therapy cycle: NA    Patient reports she is okay, complains of pain in her legs and buttocks- request to sit on a pillow. Port accessed using sterile technique, positive blood return noted. Frequency of blood return and site check throughout administration: Prior to administration, Prior to each drug and At completion of therapy     One unit PRBC given, patient tolerated well. Lab Results   Component Value Date    HGB 6.4 (LL) 05/04/2022    HCT 20.5 (L) 05/04/2022       Discharged with future appointments scheduled, Via wheelchair, accompanied by:Spouse    Outpatient Oncology Care Plan  Problem list:  pain  fatigue  knowledge deficit  nausea and vomiting  nutrition  weight loss  Problems related to:    chemotherapy  self care  side effect of treatment  Interventions:  educate hygiene/handwashing  maintain safe environment  monitor for signs/symptoms of infection  nausea/vomiting teaching  optimize nutrition status  patient/family supported  promoted rest  Expected outcomes:  adequate sleep/rest  family support/coping well  free of infection  maintains/gains weight  safe in environment  symptoms relieved/minimized  understands plan of care  Progress towards outcome:  making progress    Education Record    Learner:  Patient and Spouse  Barriers / Limitations:  None  Method:  Discussion and Reinforcement  Outcome:  Needs reinforcement  Comments:      Dr. Aleja Barron would like patient to come weekly for CBC check- schedule updated.

## 2022-05-07 ENCOUNTER — APPOINTMENT (OUTPATIENT)
Dept: CT IMAGING | Facility: HOSPITAL | Age: 77
End: 2022-05-07
Attending: EMERGENCY MEDICINE
Payer: MEDICARE

## 2022-05-07 PROBLEM — M54.9 INTRACTABLE BACK PAIN: Status: ACTIVE | Noted: 2022-05-07

## 2022-05-07 PROBLEM — M54.9 INTRACTABLE BACK PAIN: Status: ACTIVE | Noted: 2022-01-01

## 2022-05-07 NOTE — ED QUICK NOTES
US at bedside. MRI previously ordered. MRI screen performed and MRI was ordered. MRI tech called and requested to talk to Dr. Cortez Diaz. Dr. Cortez Diaz canceled MRI. Patient will have CT and then reassess need for MRI. Port accessed. OK to access per ERMD.   Patient has fentanyl and lidocaine patch on at this time, PTA.

## 2022-05-07 NOTE — ED QUICK NOTES
Orders for admission, patient is aware of plan and ready to go upstairs. Any questions, please call ED RN Lobo Walker at extension [phone]. Patient Covid vaccination status: Fully vaccinated     COVID Test Ordered in ED: Rapid SARS-CoV-2 by PCR    COVID Suspicion at Admission: Low clinical suspicion for COVID    Running Infusions:  None    Mental Status/LOC at time of transport: A&OX3    Other pertinent information: Pt uncomfortable and constantly requesting pain meds. Pt doesn't like to remain in bed for long periods of time.  Pt 1-2 assist.  at bedside,   CIWA score: N/A   NIH score:  N/A

## 2022-05-07 NOTE — ED INITIAL ASSESSMENT (HPI)
Pt states she had immunotherapy today and when she was home she had severe pain to right leg and lumbar area. Pt states she also had spinal cord surgery last year.

## 2022-05-08 NOTE — PLAN OF CARE
Patient oriented x3, forgetful. Constantly repeats self. Consistent reminders from son and . Family at bedside. VSS. Tele discontinued. Heparin for DVT prophylaxis. Tolerating diet, -BM. Voiding freely. New fentanyl patch applied. Lido patch on back. PRN morphine IVP given, now discontinued, dilaudid IVP available. R port SL. Patient up with 1 assist and RW, shorter distances. Fall precautions in place. Bed locked and in lowest position. Call light within reach. Problem: Patient Centered Care  Goal: Patient preferences are identified and integrated in the patient's plan of care  Description: Interventions:  - What would you like us to know as we care for you?   - Provide timely, complete, and accurate information to patient/family  - Incorporate patient and family knowledge, values, beliefs, and cultural backgrounds into the planning and delivery of care  - Encourage patient/family to participate in care and decision-making at the level they choose  - Honor patient and family perspectives and choices  Outcome: Progressing     Problem: Patient/Family Goals  Goal: Patient/Family Long Term Goal  Description: Patient's Long Term Goal:     Interventions:  -   - See additional Care Plan goals for specific interventions  Outcome: Progressing  Goal: Patient/Family Short Term Goal  Description: Patient's Short Term Goal:     Interventions:   -   - See additional Care Plan goals for specific interventions  Outcome: Progressing     Problem: SAFETY ADULT - FALL  Goal: Free from fall injury  Description: INTERVENTIONS:  - Assess pt frequently for physical needs  - Identify cognitive and physical deficits and behaviors that affect risk of falls.   - [place] fall precautions as indicated by assessment.  - Educate pt/family on patient safety including physical limitations  - Instruct pt to call for assistance with activity based on assessment  - Modify environment to reduce risk of injury  - Provide assistive devices as appropriate  - Consider OT/PT consult to assist with strengthening/mobility  - Encourage toileting schedule  Outcome: Progressing     Problem: DISCHARGE PLANNING  Goal: Discharge to home or other facility with appropriate resources  Description: INTERVENTIONS:  - Identify barriers to discharge w/pt and caregiver  - Include patient/family/discharge partner in discharge planning  - Arrange for needed discharge resources and transportation as appropriate  - Identify discharge learning needs (meds, wound care, etc)  - Arrange for interpreters to assist at discharge as needed  - Consider post-discharge preferences of patient/family/discharge partner  - Complete POLST form as appropriate  - Assess patient's ability to be responsible for managing their own health  - Refer to Case Management Department for coordinating discharge planning if the patient needs post-hospital services based on physician/LIP order or complex needs related to functional status, cognitive ability or social support system  Outcome: Progressing     Problem: SKIN/TISSUE INTEGRITY - ADULT  Goal: Skin integrity remains intact  Description: INTERVENTIONS  - Assess and document risk factors for pressure ulcer development  - Assess and document skin integrity  - Monitor for areas of redness and/or skin breakdown  - Initiate interventions, skin care algorithm/standards of care as needed  Outcome: Progressing  Goal: Incision(s), wounds(s) or drain site(s) healing without S/S of infection  Description: INTERVENTIONS:  - Assess and document risk factors for pressure ulcer development  - Assess and document skin integrity  - Assess and document dressing/incision, wound bed, drain sites and surrounding tissue  - Implement wound care per orders  - Initiate isolation precautions as appropriate  - Initiate Pressure Ulcer prevention bundle as indicated  Outcome: Progressing     Problem: MUSCULOSKELETAL - ADULT  Goal: Return mobility to safest level of function  Description: INTERVENTIONS:  - Assess patient stability and activity tolerance for standing, transferring and ambulating w/ or w/o assistive devices  - Assist with transfers and ambulation using safe patient handling equipment as needed  - Ensure adequate protection for wounds/incisions during mobilization  - Obtain PT/OT consults as needed  - Advance activity as appropriate  - Communicate ordered activity level and limitations with patient/family  Outcome: Progressing     Problem: NEUROLOGICAL - ADULT  Goal: Achieves stable or improved neurological status  Description: INTERVENTIONS  - Assess for and report changes in neurological status  - Initiate measures to prevent increased intracranial pressure  - Maintain blood pressure and fluid volume within ordered parameters to optimize cerebral perfusion and minimize risk of hemorrhage  - Monitor temperature, glucose, and sodium.  Initiate appropriate interventions as ordered  Outcome: Progressing

## 2022-05-08 NOTE — PLAN OF CARE
Morphine given for back pain with good results, tolerating general diet, vss, up with assist and walker, voiding freely, plan for PT/OT to see patient,  at bedside. Patient and  updated on care plan.

## 2022-05-08 NOTE — PLAN OF CARE
Problem: Patient Centered Care  Goal: Patient preferences are identified and integrated in the patient's plan of care  Description: Interventions:  - What would you like us to know as we care for you?   - Provide timely, complete, and accurate information to patient/family  - Incorporate patient and family knowledge, values, beliefs, and cultural backgrounds into the planning and delivery of care  - Encourage patient/family to participate in care and decision-making at the level they choose  - Honor patient and family perspectives and choices  Outcome: Progressing     Problem: Patient/Family Goals  Goal: Patient/Family Long Term Goal  Description: Patient's Long Term Goal:     Interventions:  -   - See additional Care Plan goals for specific interventions  Outcome: Progressing  Goal: Patient/Family Short Term Goal  Description: Patient's Short Term Goal:     Interventions:   -   - See additional Care Plan goals for specific interventions  Outcome: Progressing     Problem: SAFETY ADULT - FALL  Goal: Free from fall injury  Description: INTERVENTIONS:  - Assess pt frequently for physical needs  - Identify cognitive and physical deficits and behaviors that affect risk of falls.   - [place] fall precautions as indicated by assessment.  - Educate pt/family on patient safety including physical limitations  - Instruct pt to call for assistance with activity based on assessment  - Modify environment to reduce risk of injury  - Provide assistive devices as appropriate  - Consider OT/PT consult to assist with strengthening/mobility  - Encourage toileting schedule  Outcome: Progressing     Problem: DISCHARGE PLANNING  Goal: Discharge to home or other facility with appropriate resources  Description: INTERVENTIONS:  - Identify barriers to discharge w/pt and caregiver  - Include patient/family/discharge partner in discharge planning  - Arrange for needed discharge resources and transportation as appropriate  - Identify discharge learning needs (meds, wound care, etc)  - Arrange for interpreters to assist at discharge as needed  - Consider post-discharge preferences of patient/family/discharge partner  - Complete POLST form as appropriate  - Assess patient's ability to be responsible for managing their own health  - Refer to Case Management Department for coordinating discharge planning if the patient needs post-hospital services based on physician/LIP order or complex needs related to functional status, cognitive ability or social support system  Outcome: Progressing     Problem: SKIN/TISSUE INTEGRITY - ADULT  Goal: Skin integrity remains intact  Description: INTERVENTIONS  - Assess and document risk factors for pressure ulcer development  - Assess and document skin integrity  - Monitor for areas of redness and/or skin breakdown  - Initiate interventions, skin care algorithm/standards of care as needed  Outcome: Progressing  Goal: Incision(s), wounds(s) or drain site(s) healing without S/S of infection  Description: INTERVENTIONS:  - Assess and document risk factors for pressure ulcer development  - Assess and document skin integrity  - Assess and document dressing/incision, wound bed, drain sites and surrounding tissue  - Implement wound care per orders  - Initiate isolation precautions as appropriate  - Initiate Pressure Ulcer prevention bundle as indicated  Outcome: Progressing     Problem: MUSCULOSKELETAL - ADULT  Goal: Return mobility to safest level of function  Description: INTERVENTIONS:  - Assess patient stability and activity tolerance for standing, transferring and ambulating w/ or w/o assistive devices  - Assist with transfers and ambulation using safe patient handling equipment as needed  - Ensure adequate protection for wounds/incisions during mobilization  - Obtain PT/OT consults as needed  - Advance activity as appropriate  - Communicate ordered activity level and limitations with patient/family  Outcome: Progressing     Problem: NEUROLOGICAL - ADULT  Goal: Achieves stable or improved neurological status  Description: INTERVENTIONS  - Assess for and report changes in neurological status  - Initiate measures to prevent increased intracranial pressure  - Maintain blood pressure and fluid volume within ordered parameters to optimize cerebral perfusion and minimize risk of hemorrhage  - Monitor temperature, glucose, and sodium. Initiate appropriate interventions as ordered  Outcome: Progressing     No acute changes or distress noted. Patient's son at bedside. Reviewed safety measures and plan of care. Patient complained of back pain and requested IV Morphine. Lidocaine patch removed off lower back as ordered. Patient is resting comfortably at this time. Continue to monitor.

## 2022-05-09 NOTE — PLAN OF CARE
Problem: Patient Centered Care  Goal: Patient preferences are identified and integrated in the patient's plan of care  Description: Interventions:  - What would you like us to know as we care for you?   - Provide timely, complete, and accurate information to patient/family  - Incorporate patient and family knowledge, values, beliefs, and cultural backgrounds into the planning and delivery of care  - Encourage patient/family to participate in care and decision-making at the level they choose  - Honor patient and family perspectives and choices  Outcome: Progressing     Problem: Patient/Family Goals  Goal: Patient/Family Long Term Goal  Description: Patient's Long Term Goal:     Interventions:  -   - See additional Care Plan goals for specific interventions  Outcome: Progressing  Goal: Patient/Family Short Term Goal  Description: Patient's Short Term Goal:   Interventions:   -   - See additional Care Plan goals for specific interventions  Outcome: Progressing     Problem: SAFETY ADULT - FALL  Goal: Free from fall injury  Description: INTERVENTIONS:  - Assess pt frequently for physical needs  - Identify cognitive and physical deficits and behaviors that affect risk of falls.   - [place] fall precautions as indicated by assessment.  - Educate pt/family on patient safety including physical limitations  - Instruct pt to call for assistance with activity based on assessment  - Modify environment to reduce risk of injury  - Provide assistive devices as appropriate  - Consider OT/PT consult to assist with strengthening/mobility  - Encourage toileting schedule  Outcome: Progressing     Problem: DISCHARGE PLANNING  Goal: Discharge to home or other facility with appropriate resources  Description: INTERVENTIONS:  - Identify barriers to discharge w/pt and caregiver  - Include patient/family/discharge partner in discharge planning  - Arrange for needed discharge resources and transportation as appropriate  - Identify discharge learning needs (meds, wound care, etc)  - Arrange for interpreters to assist at discharge as needed  - Consider post-discharge preferences of patient/family/discharge partner  - Complete POLST form as appropriate  - Assess patient's ability to be responsible for managing their own health  - Refer to Case Management Department for coordinating discharge planning if the patient needs post-hospital services based on physician/LIP order or complex needs related to functional status, cognitive ability or social support system  Outcome: Progressing     Problem: SKIN/TISSUE INTEGRITY - ADULT  Goal: Skin integrity remains intact  Description: INTERVENTIONS  - Assess and document risk factors for pressure ulcer development  - Assess and document skin integrity  - Monitor for areas of redness and/or skin breakdown  - Initiate interventions, skin care algorithm/standards of care as needed  Outcome: Progressing  Goal: Incision(s), wounds(s) or drain site(s) healing without S/S of infection  Description: INTERVENTIONS:  - Assess and document risk factors for pressure ulcer development  - Assess and document skin integrity  - Assess and document dressing/incision, wound bed, drain sites and surrounding tissue  - Implement wound care per orders  - Initiate isolation precautions as appropriate  - Initiate Pressure Ulcer prevention bundle as indicated  Outcome: Progressing     Problem: MUSCULOSKELETAL - ADULT  Goal: Return mobility to safest level of function  Description: INTERVENTIONS:  - Assess patient stability and activity tolerance for standing, transferring and ambulating w/ or w/o assistive devices  - Assist with transfers and ambulation using safe patient handling equipment as needed  - Ensure adequate protection for wounds/incisions during mobilization  - Obtain PT/OT consults as needed  - Advance activity as appropriate  - Communicate ordered activity level and limitations with patient/family  Outcome: Progressing Problem: NEUROLOGICAL - ADULT  Goal: Achieves stable or improved neurological status  Description: INTERVENTIONS  - Assess for and report changes in neurological status  - Initiate measures to prevent increased intracranial pressure  - Maintain blood pressure and fluid volume within ordered parameters to optimize cerebral perfusion and minimize risk of hemorrhage  - Monitor temperature, glucose, and sodium. Initiate appropriate interventions as ordered  Outcome: Progressing     Received patient sitting up on the chair with spouse at bedside. Patient appears drowsy but easily responsive. Noted with forgetfulness and repeating questions constantly. Reviewed due medications  and their rationale with the patient. Patient was medicated with MS IR PRN for pain with relief noted. Fentanyl patch in place on left arm. Lidocaine patch removed as indicated. Encouraged patient to go lie down on bed for bedtime. Safety precautions in place. Continue to monitor.

## 2022-05-09 NOTE — PLAN OF CARE
Patient oriented x3, forgetful. Constantly repeats self. Consistent reminders from son and . Patient reports \"I just want to die, let me go peacefully. \" Hospitalist and oncology aware. Family at bedside. VSS. Heparin for DVT prophylaxis. Tolerating diet, prn suppository given for constipation, small BM per . Voiding freely. New higher dose fentanyl patch applied to R arm. Lido patch on back. PRN 60mg MSIR given. R port SL. Patient up with 1 assist and RW, shorter distances. RC for timely transfers to bathroom. Fall precautions in place. Bed locked and in lowest position. Call light within reach. Problem: Patient Centered Care  Goal: Patient preferences are identified and integrated in the patient's plan of care  Description: Interventions:  - What would you like us to know as we care for you?   - Provide timely, complete, and accurate information to patient/family  - Incorporate patient and family knowledge, values, beliefs, and cultural backgrounds into the planning and delivery of care  - Encourage patient/family to participate in care and decision-making at the level they choose  - Honor patient and family perspectives and choices  Outcome: Progressing     Problem: Patient/Family Goals  Goal: Patient/Family Long Term Goal  Description: Patient's Long Term Goal:     Interventions:  -   - See additional Care Plan goals for specific interventions  Outcome: Progressing  Goal: Patient/Family Short Term Goal  Description: Patient's Short Term Goal:     Interventions:   -   - See additional Care Plan goals for specific interventions  Outcome: Progressing     Problem: SAFETY ADULT - FALL  Goal: Free from fall injury  Description: INTERVENTIONS:  - Assess pt frequently for physical needs  - Identify cognitive and physical deficits and behaviors that affect risk of falls.   - [place] fall precautions as indicated by assessment.  - Educate pt/family on patient safety including physical limitations  - Instruct pt to call for assistance with activity based on assessment  - Modify environment to reduce risk of injury  - Provide assistive devices as appropriate  - Consider OT/PT consult to assist with strengthening/mobility  - Encourage toileting schedule  Outcome: Progressing     Problem: DISCHARGE PLANNING  Goal: Discharge to home or other facility with appropriate resources  Description: INTERVENTIONS:  - Identify barriers to discharge w/pt and caregiver  - Include patient/family/discharge partner in discharge planning  - Arrange for needed discharge resources and transportation as appropriate  - Identify discharge learning needs (meds, wound care, etc)  - Arrange for interpreters to assist at discharge as needed  - Consider post-discharge preferences of patient/family/discharge partner  - Complete POLST form as appropriate  - Assess patient's ability to be responsible for managing their own health  - Refer to Case Management Department for coordinating discharge planning if the patient needs post-hospital services based on physician/LIP order or complex needs related to functional status, cognitive ability or social support system  Outcome: Progressing     Problem: SKIN/TISSUE INTEGRITY - ADULT  Goal: Skin integrity remains intact  Description: INTERVENTIONS  - Assess and document risk factors for pressure ulcer development  - Assess and document skin integrity  - Monitor for areas of redness and/or skin breakdown  - Initiate interventions, skin care algorithm/standards of care as needed  Outcome: Progressing  Goal: Incision(s), wounds(s) or drain site(s) healing without S/S of infection  Description: INTERVENTIONS:  - Assess and document risk factors for pressure ulcer development  - Assess and document skin integrity  - Assess and document dressing/incision, wound bed, drain sites and surrounding tissue  - Implement wound care per orders  - Initiate isolation precautions as appropriate  - Initiate Pressure Ulcer prevention bundle as indicated  Outcome: Progressing     Problem: MUSCULOSKELETAL - ADULT  Goal: Return mobility to safest level of function  Description: INTERVENTIONS:  - Assess patient stability and activity tolerance for standing, transferring and ambulating w/ or w/o assistive devices  - Assist with transfers and ambulation using safe patient handling equipment as needed  - Ensure adequate protection for wounds/incisions during mobilization  - Obtain PT/OT consults as needed  - Advance activity as appropriate  - Communicate ordered activity level and limitations with patient/family  Outcome: Progressing     Problem: NEUROLOGICAL - ADULT  Goal: Achieves stable or improved neurological status  Description: INTERVENTIONS  - Assess for and report changes in neurological status  - Initiate measures to prevent increased intracranial pressure  - Maintain blood pressure and fluid volume within ordered parameters to optimize cerebral perfusion and minimize risk of hemorrhage  - Monitor temperature, glucose, and sodium.  Initiate appropriate interventions as ordered  Outcome: Progressing

## 2022-05-09 NOTE — OCCUPATIONAL THERAPY NOTE
OT orders received, chart review. Pt eating breakfast and receiving nursing care. Pt and spouse requesting to reschedule session for a later time.

## 2022-05-09 NOTE — PHYSICAL THERAPY NOTE
PHYSICAL THERAPY TREATMENT NOTE - INPATIENT     Room Number: 898/115-M       Presenting Problem: intractable back pain (Imaging (-) for acute issue)    Problem List  Principal Problem:    Intractable back pain      PHYSICAL THERAPY ASSESSMENT   Chart reviewed. RN  approved participation in physical therapy. PPE worn by therapist: mask, gloves and goggles. Patient was wearing a mask during session. Patient presented in bed with 8/10 pain. Patient with good  progress towards goals during this session. Education provided on Physical therapy plan of care and physiological benefits of out of bed mobility. Patient with good carryover. Bed mobility: Mod assist  Transfers: Mod assist  Gait Assistance: Moderate assistance  Distance (ft): 2 x 40  Assistive Device: Rolling walker  Pattern: Shuffle          Pt seen daily. Family present;family education provided;all questions and concerns addressed. Mod a for bed mobility and transfer. EOB sitting balance activity with emphasis ob core stabilization. Pt amb 2 x 40 ft with RW and mod a;slow raymond. Provided cuing for gait pattern as well as for postural awareness. Few rest breaks provided. Ther ex. Patient was left in bed at end of session with all needs in reach. The patient's Approx Degree of Impairment: 50.57% has been calculated based on documentation in the [facility] '6 clicks' Inpatient Basic Mobility Short Form. Research supports that patients with this level of impairment may benefit from  Sub-acute Rehabilitation/ Home with home health PT. RN aware of patient status post session. DISCHARGE RECOMMENDATIONS  PT Discharge Recommendations: Sub-acute rehabilitation;Home with home health PT     PLAN  PT Treatment Plan: Bed mobility; Endurance; Patient education;Gait training    SUBJECTIVE  Pt reports being ready for PT RX    OBJECTIVE  Precautions: Bed/chair alarm    WEIGHT BEARING RESTRICTION  Weight Bearing Restriction: None                PAIN ASSESSMENT   Rating: 8  Location: back pain and RLE spasms (mostly when in sitting position, relieved with standing)  Management Techniques: Activity promotion;Relaxation;Repositioning; Body mechanics;Breathing techniques (received pain meds prior to session)    BALANCE                                                                                                                       Static Sitting: Fair +  Dynamic Sitting: Fair           Static Standing: Fair -  Dynamic Standing: Fair -    ACTIVITY TOLERANCE           BP: 106/57  BP Location: Right arm  BP Method: Automatic  Patient Position: Sitting    O2 WALK       AM-PAC '6-Clicks' INPATIENT SHORT FORM - BASIC MOBILITY  How much difficulty does the patient currently have. .. Patient Difficulty: Turning over in bed (including adjusting bedclothes, sheets and blankets)?: A Little   Patient Difficulty: Sitting down on and standing up from a chair with arms (e.g., wheelchair, bedside commode, etc.): A Little   Patient Difficulty: Moving from lying on back to sitting on the side of the bed?: A Little   How much help from another person does the patient currently need. .. Help from Another: Moving to and from a bed to a chair (including a wheelchair)?: A Little   Help from Another: Need to walk in hospital room?: A Little   Help from Another: Climbing 3-5 steps with a railing?: A Lot     AM-PAC Score:  Raw Score: 17   Approx Degree of Impairment: 50.57%   Standardized Score (AM-PAC Scale): 42.13   CMS Modifier (G-Code): CK      Additional information:     THERAPEUTIC EXERCISES  Lower Extremity Ankle pumps  Glut sets  Heel slides  Quad sets     Position Supine       Patient End of Session: Up in chair;Call light within reach;RN aware of session/findings;Bracing education provided per handout; All patient questions and concerns addressed    CURRENT GOALS       Patient Goal Patient's self-stated goal is: to be out of pain   Goal #1 Patient is able to demonstrate supine - sit EOB @ level: minimum assistance     Goal #1   Current Status Mod a   Goal #2 Patient is able to demonstrate transfers Sit to/from Stand at assistance level: supervision with walker - rolling     Goal #2  Current Status Mod a   Goal #3 Patient is able to ambulate 150 feet with assist device: walker - rolling at assistance level: supervision   Goal #3   Current Status Pt amb 2 x 40 ft with RW and mod a   Goal #4    Goal #4   Current Status    Goal #5 Patient to demonstrate independence with home activity/exercise instructions provided to patient in preparation for discharge.    Goal #5   Current Status In ptogress   Goal #6    Goal #6  Current Status

## 2022-05-09 NOTE — CM/SW NOTE
05/09/22 1600   CM/SW Referral Data   Referral Source Social Work (self-referral)   Reason for Referral Discharge planning   Informant Patient;Spouse/Significant Other   Pertinent Medical Hx   Does patient have an established PCP? Yes   Patient Info   Patient's Current Mental Status at Time of Assessment Alert;Oriented   Patient's Home Environment Condo/Apt with elevator   Patient lives with Spouse/Significant other   Patient Status Prior to Admission   Independent with ADLs and Mobility No   Pt. requires assistance with Housework;Driving;Meals; Bathing; Ambulating;Dressing;Medications; Feeding; Toileting;Finances   Discharge Needs   Anticipated D/C needs To be determined       SW initiated self referral for DC planning. SW met with patient at bedside, introduced self and role. , Emily Christianson also at bedside. Patient alert and oriented, however a bit forgetful, required redirection at times. Patient lives at (address correct on face sheet). With her . Patient has been requiring assistance with ADL's. Patient does not drive. Patient has a cane and a walker at home. Patient is not current with home health care services at this time. , Emily Christianson provides 24 hour car/supervison. SW discussed PT rec for [place] [id number] vs [facility]. Patient and  are agreeable to [facility]. Would like to look at a list of facilities. Pasrr requested and attached to referral.     PLAN: Patient in agreement to [facility]. Referrals pending in A[place], need to provide choice list when avail.            KAILEY Tovar, [facility]    [id number]

## 2022-05-10 NOTE — PLAN OF CARE
Problem: SAFETY ADULT - FALL  Goal: Free from fall injury  Description: INTERVENTIONS:  - Assess pt frequently for physical needs  - Identify cognitive and physical deficits and behaviors that affect risk of falls.   - [place] fall precautions as indicated by assessment.  - Educate pt/family on patient safety including physical limitations  - Instruct pt to call for assistance with activity based on assessment  - Modify environment to reduce risk of injury  - Provide assistive devices as appropriate  - Consider OT/PT consult to assist with strengthening/mobility  - Encourage toileting schedule  Outcome: Progressing     Problem: DISCHARGE PLANNING  Goal: Discharge to home or other facility with appropriate resources  Description: INTERVENTIONS:  - Identify barriers to discharge w/pt and caregiver  - Include patient/family/discharge partner in discharge planning  - Arrange for needed discharge resources and transportation as appropriate  - Identify discharge learning needs (meds, wound care, etc)  - Arrange for interpreters to assist at discharge as needed  - Consider post-discharge preferences of patient/family/discharge partner  - Complete POLST form as appropriate  - Assess patient's ability to be responsible for managing their own health  - Refer to Case Management Department for coordinating discharge planning if the patient needs post-hospital services based on physician/LIP order or complex needs related to functional status, cognitive ability or social support system  Outcome: Progressing     Problem: SKIN/TISSUE INTEGRITY - ADULT  Goal: Skin integrity remains intact  Description: INTERVENTIONS  - Assess and document risk factors for pressure ulcer development  - Assess and document skin integrity  - Monitor for areas of redness and/or skin breakdown  - Initiate interventions, skin care algorithm/standards of care as needed  Outcome: Progressing  Goal: Incision(s), wounds(s) or drain site(s) healing without S/S of infection  Description: INTERVENTIONS:  - Assess and document risk factors for pressure ulcer development  - Assess and document skin integrity  - Assess and document dressing/incision, wound bed, drain sites and surrounding tissue  - Implement wound care per orders  - Initiate isolation precautions as appropriate  - Initiate Pressure Ulcer prevention bundle as indicated  Outcome: Progressing     Problem: MUSCULOSKELETAL - ADULT  Goal: Return mobility to safest level of function  Description: INTERVENTIONS:  - Assess patient stability and activity tolerance for standing, transferring and ambulating w/ or w/o assistive devices  - Assist with transfers and ambulation using safe patient handling equipment as needed  - Ensure adequate protection for wounds/incisions during mobilization  - Obtain PT/OT consults as needed  - Advance activity as appropriate  - Communicate ordered activity level and limitations with patient/family  Outcome: Progressing     Problem: NEUROLOGICAL - ADULT  Goal: Achieves stable or improved neurological status  Description: INTERVENTIONS  - Assess for and report changes in neurological status  - Initiate measures to prevent increased intracranial pressure  - Maintain blood pressure and fluid volume within ordered parameters to optimize cerebral perfusion and minimize risk of hemorrhage  - Monitor temperature, glucose, and sodium.  Initiate appropriate interventions as ordered  Outcome: Progressing

## 2022-05-10 NOTE — PROGRESS NOTES
Nurse states pt had eggs at 10AM today. hgb stable today. Thus egd deferred to tomorrow.  Npo after mn

## 2022-05-10 NOTE — INTERVAL H&P NOTE
Pre-op Diagnosis: melena    The above referenced H&P was reviewed by Alexandru Garcia MD on 5/10/2022, the patient was examined and no significant changes have occurred in the patient's condition since the H&P was performed. I discussed with the patient and/or legal representative the potential benefits, risks and side effects of this procedure; the likelihood of the patient achieving goals; and potential problems that might occur during recuperation. I discussed reasonable alternatives to the procedure, including risks, benefits and side effects related to the alternatives and risks related to not receiving this procedure. We will proceed with procedure as planned.

## 2022-05-10 NOTE — PLAN OF CARE
Patient oriented x3, forgetful. Constantly repeats self. Consistent reminders from son and . Family at bedside. VSS. Heparin on hold. NPO after breakfast per order. Plan was EGD at 1600. Transport arrived in room, this rn called endo rn to give report where she was notified of patient having breakfast. Per MD, will need to defer to tomorrow, NPO after midnight. Family and patient notified. Started back on LF/ soft diet. 2x BM today, 1 incontinence episode with green loose stool. Voiding freely. Fentanyl patch on R arm. Lido patch on back. PRN 45mg MSIR given. R port SL. Patient up with 1 assist and RW, shorter distances. RC for timely transfers to bathroom. Fall precautions in place. Bed locked and in lowest position. Call light within reach.   Problem: Patient Centered Care  Goal: Patient preferences are identified and integrated in the patient's plan of care  Description: Interventions:  - What would you like us to know as we care for you?   - Provide timely, complete, and accurate information to patient/family  - Incorporate patient and family knowledge, values, beliefs, and cultural backgrounds into the planning and delivery of care  - Encourage patient/family to participate in care and decision-making at the level they choose  - Honor patient and family perspectives and choices  Outcome: Progressing     Problem: Patient/Family Goals  Goal: Patient/Family Long Term Goal  Description: Patient's Long Term Goal:     Interventions:  -   - See additional Care Plan goals for specific interventions  Outcome: Progressing  Goal: Patient/Family Short Term Goal  Description: Patient's Short Term Goal:     Interventions:   -   - See additional Care Plan goals for specific interventions  Outcome: Progressing     Problem: SAFETY ADULT - FALL  Goal: Free from fall injury  Description: INTERVENTIONS:  - Assess pt frequently for physical needs  - Identify cognitive and physical deficits and behaviors that affect risk of falls.  - [place] fall precautions as indicated by assessment.  - Educate pt/family on patient safety including physical limitations  - Instruct pt to call for assistance with activity based on assessment  - Modify environment to reduce risk of injury  - Provide assistive devices as appropriate  - Consider OT/PT consult to assist with strengthening/mobility  - Encourage toileting schedule  Outcome: Progressing     Problem: DISCHARGE PLANNING  Goal: Discharge to home or other facility with appropriate resources  Description: INTERVENTIONS:  - Identify barriers to discharge w/pt and caregiver  - Include patient/family/discharge partner in discharge planning  - Arrange for needed discharge resources and transportation as appropriate  - Identify discharge learning needs (meds, wound care, etc)  - Arrange for interpreters to assist at discharge as needed  - Consider post-discharge preferences of patient/family/discharge partner  - Complete POLST form as appropriate  - Assess patient's ability to be responsible for managing their own health  - Refer to Case Management Department for coordinating discharge planning if the patient needs post-hospital services based on physician/LIP order or complex needs related to functional status, cognitive ability or social support system  Outcome: Progressing     Problem: SKIN/TISSUE INTEGRITY - ADULT  Goal: Skin integrity remains intact  Description: INTERVENTIONS  - Assess and document risk factors for pressure ulcer development  - Assess and document skin integrity  - Monitor for areas of redness and/or skin breakdown  - Initiate interventions, skin care algorithm/standards of care as needed  Outcome: Progressing  Goal: Incision(s), wounds(s) or drain site(s) healing without S/S of infection  Description: INTERVENTIONS:  - Assess and document risk factors for pressure ulcer development  - Assess and document skin integrity  - Assess and document dressing/incision, wound bed, drain sites and surrounding tissue  - Implement wound care per orders  - Initiate isolation precautions as appropriate  - Initiate Pressure Ulcer prevention bundle as indicated  Outcome: Progressing     Problem: MUSCULOSKELETAL - ADULT  Goal: Return mobility to safest level of function  Description: INTERVENTIONS:  - Assess patient stability and activity tolerance for standing, transferring and ambulating w/ or w/o assistive devices  - Assist with transfers and ambulation using safe patient handling equipment as needed  - Ensure adequate protection for wounds/incisions during mobilization  - Obtain PT/OT consults as needed  - Advance activity as appropriate  - Communicate ordered activity level and limitations with patient/family  Outcome: Progressing     Problem: NEUROLOGICAL - ADULT  Goal: Achieves stable or improved neurological status  Description: INTERVENTIONS  - Assess for and report changes in neurological status  - Initiate measures to prevent increased intracranial pressure  - Maintain blood pressure and fluid volume within ordered parameters to optimize cerebral perfusion and minimize risk of hemorrhage  - Monitor temperature, glucose, and sodium.  Initiate appropriate interventions as ordered  Outcome: Progressing

## 2022-05-10 NOTE — CM/SW NOTE
MDO Hospice (informational session)    Referral with order sent via aidin to [facility]. Called and spoke with [facility] RN to notify. 352pm  SW met with patient and  Jayjay Patel at bedside. Jajyay Patel confirmed that he would like for patient to go to [facility] before home hospice option. [facility] list provided to Jayjay Patel. Jayjay Patle inquiring about locations in [place]. However, will review provided list and call SW if he prefers any of those locations. SW expanded [place] referral to include [place], will provide alternate options when avail. PLAN: Patient in agreement to [facility]. Referrals pending in Aidin, need to provide choice list when avail.    [facility], [facility], [facility]    [id number]

## 2022-05-10 NOTE — HOSPICE RN NOTE
[facility] met in the room with spouse and patient to discuss Hospice. Spouse is thinking he wants his wife to go to SNF for Rehab for a while before heading home. He is in agreement with Hospice and he wants to take his wife back to their home in [place]. If this is the case we will have to re refer to another Hospice agency because [facility]'t go to the [facility].   Nichol Suazo RN TNL   [facility] [id number]

## 2022-05-10 NOTE — OCCUPATIONAL THERAPY NOTE
OT orders received and chart reviewed.  Pt off unit for EGD and unavailable for therapy at this time

## 2022-05-11 ENCOUNTER — ANESTHESIA EVENT (OUTPATIENT)
Dept: ENDOSCOPY | Facility: HOSPITAL | Age: 77
End: 2022-05-11
Payer: MEDICARE

## 2022-05-11 ENCOUNTER — ANESTHESIA (OUTPATIENT)
Dept: ENDOSCOPY | Facility: HOSPITAL | Age: 77
End: 2022-05-11
Payer: MEDICARE

## 2022-05-11 ENCOUNTER — APPOINTMENT (OUTPATIENT)
Dept: GENERAL RADIOLOGY | Facility: HOSPITAL | Age: 77
End: 2022-05-11
Attending: HOSPITALIST
Payer: MEDICARE

## 2022-05-11 ENCOUNTER — APPOINTMENT (OUTPATIENT)
Dept: MRI IMAGING | Facility: HOSPITAL | Age: 77
End: 2022-05-11
Attending: INTERNAL MEDICINE
Payer: MEDICARE

## 2022-05-11 RX ORDER — SODIUM CHLORIDE, SODIUM LACTATE, POTASSIUM CHLORIDE, CALCIUM CHLORIDE 600; 310; 30; 20 MG/100ML; MG/100ML; MG/100ML; MG/100ML
INJECTION, SOLUTION INTRAVENOUS CONTINUOUS PRN
Status: DISCONTINUED | OUTPATIENT
Start: 2022-05-11 | End: 2022-05-11 | Stop reason: SURG

## 2022-05-11 RX ORDER — LIDOCAINE HYDROCHLORIDE 10 MG/ML
INJECTION, SOLUTION EPIDURAL; INFILTRATION; INTRACAUDAL; PERINEURAL AS NEEDED
Status: DISCONTINUED | OUTPATIENT
Start: 2022-05-11 | End: 2022-05-11 | Stop reason: SURG

## 2022-05-11 RX ADMIN — LIDOCAINE HYDROCHLORIDE 80 MG: 10 INJECTION, SOLUTION EPIDURAL; INFILTRATION; INTRACAUDAL; PERINEURAL at 16:25:00

## 2022-05-11 RX ADMIN — SODIUM CHLORIDE, SODIUM LACTATE, POTASSIUM CHLORIDE, CALCIUM CHLORIDE: 600; 310; 30; 20 INJECTION, SOLUTION INTRAVENOUS at 16:23:00

## 2022-05-11 RX ADMIN — SODIUM CHLORIDE, SODIUM LACTATE, POTASSIUM CHLORIDE, CALCIUM CHLORIDE: 600; 310; 30; 20 INJECTION, SOLUTION INTRAVENOUS at 16:39:00

## 2022-05-11 NOTE — CM/SW NOTE
Met with patient and , Papa Dubon at bedside, provided updated [facility] list. Papa Dubon states that he would like patient to go to [facility] [place] [id number], however he would like to confirm with his son first before SW reserves. SW asked Papa Dubon to call once confirmed with son, Papa Dubon understood and agreed.      KAILEY Fowler, [facility]    [id number]

## 2022-05-11 NOTE — INTERVAL H&P NOTE
Pre-op Diagnosis: melena    The above referenced H&P was reviewed by Mónica Snyder MD on 5/11/2022, the patient was examined and no significant changes have occurred in the patient's condition since the H&P was performed. I discussed with the patient and/or legal representative the potential benefits, risks and side effects of this procedure; the likelihood of the patient achieving goals; and potential problems that might occur during recuperation. I discussed reasonable alternatives to the procedure, including risks, benefits and side effects related to the alternatives and risks related to not receiving this procedure. We will proceed with procedure as planned.

## 2022-05-11 NOTE — PROGRESS NOTES
[facility] Pharmacy Note:  Renal Adjustment for levofloxacin (Liya Vázquez)    Kashif Brown is a 68year old patient who has been prescribed levofloxacin (LEVAQUIN) 500 mg every 24 hrs. The estimated crcl = 39 ml/min. The dose has been adjusted to levofloxacin (LEVAQUIN) 750 mg every 48 hrs per hospital renal dose adjustment protocol for treatment of pneumonia. Pharmacy will follow and adjust dose as warranted for additional renal function changes.     Thank you,    Víctor Gusman, PharmD  5/11/2022  12:50 PM

## 2022-05-11 NOTE — PLAN OF CARE
Pt is alert and oriented but can be forgetful. NPO since midnight for possible EGD. I spoke with MRI tech who stated the MRI will be done today instead of last night, plan to give xanax one hour before. Morphine immediate release given for pain management. Purewick in place. Right chest port is saline locked.  at bedside. Call light within reach. Fall precautions maintained. Problem: Patient Centered Care  Goal: Patient preferences are identified and integrated in the patient's plan of care  Description: Interventions:  - What would you like us to know as we care for you?   - Provide timely, complete, and accurate information to patient/family  - Incorporate patient and family knowledge, values, beliefs, and cultural backgrounds into the planning and delivery of care  - Encourage patient/family to participate in care and decision-making at the level they choose  - Honor patient and family perspectives and choices  Outcome: Progressing     Problem: Patient/Family Goals  Goal: Patient/Family Long Term Goal  Description: Patient's Long Term Goal:     Interventions:  -   - See additional Care Plan goals for specific interventions  Outcome: Progressing  Goal: Patient/Family Short Term Goal  Description: Patient's Short Term Goal:     Interventions:   -  - See additional Care Plan goals for specific interventions  Outcome: Progressing     Problem: SAFETY ADULT - FALL  Goal: Free from fall injury  Description: INTERVENTIONS:  - Assess pt frequently for physical needs  - Identify cognitive and physical deficits and behaviors that affect risk of falls.   - [place] fall precautions as indicated by assessment.  - Educate pt/family on patient safety including physical limitations  - Instruct pt to call for assistance with activity based on assessment  - Modify environment to reduce risk of injury  - Provide assistive devices as appropriate  - Consider OT/PT consult to assist with strengthening/mobility  - Encourage toileting schedule  Outcome: Progressing     Problem: DISCHARGE PLANNING  Goal: Discharge to home or other facility with appropriate resources  Description: INTERVENTIONS:  - Identify barriers to discharge w/pt and caregiver  - Include patient/family/discharge partner in discharge planning  - Arrange for needed discharge resources and transportation as appropriate  - Identify discharge learning needs (meds, wound care, etc)  - Arrange for interpreters to assist at discharge as needed  - Consider post-discharge preferences of patient/family/discharge partner  - Complete POLST form as appropriate  - Assess patient's ability to be responsible for managing their own health  - Refer to Case Management Department for coordinating discharge planning if the patient needs post-hospital services based on physician/LIP order or complex needs related to functional status, cognitive ability or social support system  Outcome: Progressing     Problem: SKIN/TISSUE INTEGRITY - ADULT  Goal: Skin integrity remains intact  Description: INTERVENTIONS  - Assess and document risk factors for pressure ulcer development  - Assess and document skin integrity  - Monitor for areas of redness and/or skin breakdown  - Initiate interventions, skin care algorithm/standards of care as needed  Outcome: Progressing  Goal: Incision(s), wounds(s) or drain site(s) healing without S/S of infection  Description: INTERVENTIONS:  - Assess and document risk factors for pressure ulcer development  - Assess and document skin integrity  - Assess and document dressing/incision, wound bed, drain sites and surrounding tissue  - Implement wound care per orders  - Initiate isolation precautions as appropriate  - Initiate Pressure Ulcer prevention bundle as indicated  Outcome: Progressing     Problem: MUSCULOSKELETAL - ADULT  Goal: Return mobility to safest level of function  Description: INTERVENTIONS:  - Assess patient stability and activity tolerance for standing, transferring and ambulating w/ or w/o assistive devices  - Assist with transfers and ambulation using safe patient handling equipment as needed  - Ensure adequate protection for wounds/incisions during mobilization  - Obtain PT/OT consults as needed  - Advance activity as appropriate  - Communicate ordered activity level and limitations with patient/family  Outcome: Progressing     Problem: NEUROLOGICAL - ADULT  Goal: Achieves stable or improved neurological status  Description: INTERVENTIONS  - Assess for and report changes in neurological status  - Initiate measures to prevent increased intracranial pressure  - Maintain blood pressure and fluid volume within ordered parameters to optimize cerebral perfusion and minimize risk of hemorrhage  - Monitor temperature, glucose, and sodium.  Initiate appropriate interventions as ordered  Outcome: Progressing

## 2022-05-11 NOTE — SLP NOTE
SPEECH PATHOLOGY NOTE:    Order received, chart reviewed. Attempted to see pt for a clinical evaluation of dysphagia. Pt strict NPO for an EGD, being transported off the floor when SLP arrived to room. SLP service will re-attempt this evaluation as pt and slp able and available on 5/12/22.      Jeffery Harper MA, CCC-SLP  Speech and Language Pathologist

## 2022-05-11 NOTE — OPERATIVE REPORT
ESOPHAGOGASTRODUODENOSCOPY (EGD) REPORT    Myesha Baltazar     1945 Age 68year old   PCP Nathaly Massey MD Endoscopist Cristian Rodriguez MD     Date of procedure: 22    Procedure: EGD w/biopsy    Pre-operative diagnosis: melena, acute blood loss anemia    Post-operative diagnosis: see impression    Medications: MAC    Complications: none    Procedure:  Informed consent was obtained from the patient after the risks of the procedure were discussed, including but not limited to bleeding, perforation, aspiration, infection, or possibility of a missed lesion. After discussions of the risks/benefits and alternatives to this procedure, as well as the planned sedation, the patient was placed in the left lateral decubitus position and begun on continuous blood pressure pulse oximetry and EKG monitoring and this was maintained throughout the procedure. Once an adequate level of sedation was obtained a bite block was placed. Then the lubricated tip of the [id number] diagnostic video upper endoscope was inserted and advanced using direct visualization into the posterior pharynx and ultimately into the esophagus, stomach, and duodenum. Complications: None    Findings:      1. Esophagus: The squamocolumnar junction was noted at 39 cm and appeared normal. The diaphragmatic pinch was noted noted at 39 cm from the incisors. The esophagus appeared normal throughout its entire length with no evidence of rings, webs or luminal narrowing. There was no evidence of erosions, ulcerations, varices, or masses. 2. Stomach: We then entered the stomach. Few small erosions in the antrum, non-bleeding. Otherwise gastric mucosa appeared normal in the forward view with no evidence of erythema, or ulcerations. There was no evidence of any luminal or submucosal masses. A retroflexed view allowed examination of the angularis, cardia and fundus and these areas also appeared normal with a non-patulous cardia. No hiatal hernia seen. Random biopsies of the stomach (body, antrum, cardia, and incisura) were taken with cold forceps for histology. 3. Duodenum: The duodenal mucosa appeared normal in the 1st and 2nd portion of the duodenum. Bilious effluent present. We then withdrew the instrument from the patient who tolerated the procedure well. Impression:   1. Few small non-bleeding gastric erosions, otherwise normal EGD    Recommend:  1. Return patient to [facility] for [facility] care. 2. Continue ppi daily  3. Ok to resume diet  4. Continue to monitor hgb    >>>If tissue was sampled/removed and you have not received your pathology results either by phone or letter within 2 weeks, please call our office at [phone].     Specimens:  Gastric     Blood loss: <1 ml      Lucila Kate MD  [facility], [facility]

## 2022-05-11 NOTE — PLAN OF CARE
Patient oriented x3, forgetful. Constantly repeats self. Consistent reminders from . Family at bedside. More drowsy today. VSS, afebrile this shift. Heparin on hold. NPO for EGD today. - BM today. Voiding in purewick, depends changed due to leaking. Fentanyl patch on R arm. Lido patch not applied today for anticipated mri. PRN 45mg MSIR given. 1x dose of dilaudid given. IV levaquin infused, electrolyte replacement. R port. Patient up with 1 assist and RW. Fall precautions in place. Bed locked and in lowest position. Call light within reach. Brain MRI to be done today. SLP eval to be done likely tomorrow for possible aspiration, no signs of aspiration noted by this rn. Problem: Patient Centered Care  Goal: Patient preferences are identified and integrated in the patient's plan of care  Description: Interventions:  - What would you like us to know as we care for you?   - Provide timely, complete, and accurate information to patient/family  - Incorporate patient and family knowledge, values, beliefs, and cultural backgrounds into the planning and delivery of care  - Encourage patient/family to participate in care and decision-making at the level they choose  - Honor patient and family perspectives and choices  Outcome: Progressing     Problem: Patient/Family Goals  Goal: Patient/Family Long Term Goal  Description: Patient's Long Term Goal:     Interventions:  -   - See additional Care Plan goals for specific interventions  Outcome: Progressing  Goal: Patient/Family Short Term Goal  Description: Patient's Short Term Goal:     Interventions:   -   - See additional Care Plan goals for specific interventions  Outcome: Progressing     Problem: SAFETY ADULT - FALL  Goal: Free from fall injury  Description: INTERVENTIONS:  - Assess pt frequently for physical needs  - Identify cognitive and physical deficits and behaviors that affect risk of falls. - [place] fall precautions as indicated by assessment.   - Educate pt/family on patient safety including physical limitations  - Instruct pt to call for assistance with activity based on assessment  - Modify environment to reduce risk of injury  - Provide assistive devices as appropriate  - Consider OT/PT consult to assist with strengthening/mobility  - Encourage toileting schedule  Outcome: Progressing     Problem: DISCHARGE PLANNING  Goal: Discharge to home or other facility with appropriate resources  Description: INTERVENTIONS:  - Identify barriers to discharge w/pt and caregiver  - Include patient/family/discharge partner in discharge planning  - Arrange for needed discharge resources and transportation as appropriate  - Identify discharge learning needs (meds, wound care, etc)  - Arrange for interpreters to assist at discharge as needed  - Consider post-discharge preferences of patient/family/discharge partner  - Complete POLST form as appropriate  - Assess patient's ability to be responsible for managing their own health  - Refer to Case Management Department for coordinating discharge planning if the patient needs post-hospital services based on physician/LIP order or complex needs related to functional status, cognitive ability or social support system  Outcome: Progressing     Problem: SKIN/TISSUE INTEGRITY - ADULT  Goal: Skin integrity remains intact  Description: INTERVENTIONS  - Assess and document risk factors for pressure ulcer development  - Assess and document skin integrity  - Monitor for areas of redness and/or skin breakdown  - Initiate interventions, skin care algorithm/standards of care as needed  Outcome: Progressing  Goal: Incision(s), wounds(s) or drain site(s) healing without S/S of infection  Description: INTERVENTIONS:  - Assess and document risk factors for pressure ulcer development  - Assess and document skin integrity  - Assess and document dressing/incision, wound bed, drain sites and surrounding tissue  - Implement wound care per orders  - Initiate isolation precautions as appropriate  - Initiate Pressure Ulcer prevention bundle as indicated  Outcome: Progressing     Problem: MUSCULOSKELETAL - ADULT  Goal: Return mobility to safest level of function  Description: INTERVENTIONS:  - Assess patient stability and activity tolerance for standing, transferring and ambulating w/ or w/o assistive devices  - Assist with transfers and ambulation using safe patient handling equipment as needed  - Ensure adequate protection for wounds/incisions during mobilization  - Obtain PT/OT consults as needed  - Advance activity as appropriate  - Communicate ordered activity level and limitations with patient/family  Outcome: Progressing     Problem: NEUROLOGICAL - ADULT  Goal: Achieves stable or improved neurological status  Description: INTERVENTIONS  - Assess for and report changes in neurological status  - Initiate measures to prevent increased intracranial pressure  - Maintain blood pressure and fluid volume within ordered parameters to optimize cerebral perfusion and minimize risk of hemorrhage  - Monitor temperature, glucose, and sodium.  Initiate appropriate interventions as ordered  Outcome: Progressing

## 2022-05-12 NOTE — PLAN OF CARE
Problem: Patient Centered Care  Goal: Patient preferences are identified and integrated in the patient's plan of care  Description: Interventions:  - What would you like us to know as we care for you?   - Provide timely, complete, and accurate information to patient/family  - Incorporate patient and family knowledge, values, beliefs, and cultural backgrounds into the planning and delivery of care  - Encourage patient/family to participate in care and decision-making at the level they choose  - Honor patient and family perspectives and choices  Outcome: Progressing     Problem: Patient/Family Goals  Goal: Patient/Family Long Term Goal  Description: Patient's Long Term Goal:     Interventions:  -   - See additional Care Plan goals for specific interventions  Outcome: Progressing  Goal: Patient/Family Short Term Goal  Description: Patient's Short Term Goal:     Interventions:   -  - See additional Care Plan goals for specific interventions  Outcome: Progressing     Problem: SAFETY ADULT - FALL  Goal: Free from fall injury  Description: INTERVENTIONS:  - Assess pt frequently for physical needs  - Identify cognitive and physical deficits and behaviors that affect risk of falls.   - [place] fall precautions as indicated by assessment.  - Educate pt/family on patient safety including physical limitations  - Instruct pt to call for assistance with activity based on assessment  - Modify environment to reduce risk of injury  - Provide assistive devices as appropriate  - Consider OT/PT consult to assist with strengthening/mobility  - Encourage toileting schedule  Outcome: Progressing     Problem: DISCHARGE PLANNING  Goal: Discharge to home or other facility with appropriate resources  Description: INTERVENTIONS:  - Identify barriers to discharge w/pt and caregiver  - Include patient/family/discharge partner in discharge planning  - Arrange for needed discharge resources and transportation as appropriate  - Identify discharge learning needs (meds, wound care, etc)  - Arrange for interpreters to assist at discharge as needed  - Consider post-discharge preferences of patient/family/discharge partner  - Complete POLST form as appropriate  - Assess patient's ability to be responsible for managing their own health  - Refer to Case Management Department for coordinating discharge planning if the patient needs post-hospital services based on physician/LIP order or complex needs related to functional status, cognitive ability or social support system  Outcome: Progressing     Problem: SKIN/TISSUE INTEGRITY - ADULT  Goal: Skin integrity remains intact  Description: INTERVENTIONS  - Assess and document risk factors for pressure ulcer development  - Assess and document skin integrity  - Monitor for areas of redness and/or skin breakdown  - Initiate interventions, skin care algorithm/standards of care as needed  Outcome: Progressing  Goal: Incision(s), wounds(s) or drain site(s) healing without S/S of infection  Description: INTERVENTIONS:  - Assess and document risk factors for pressure ulcer development  - Assess and document skin integrity  - Assess and document dressing/incision, wound bed, drain sites and surrounding tissue  - Implement wound care per orders  - Initiate isolation precautions as appropriate  - Initiate Pressure Ulcer prevention bundle as indicated  Outcome: Progressing     Problem: MUSCULOSKELETAL - ADULT  Goal: Return mobility to safest level of function  Description: INTERVENTIONS:  - Assess patient stability and activity tolerance for standing, transferring and ambulating w/ or w/o assistive devices  - Assist with transfers and ambulation using safe patient handling equipment as needed  - Ensure adequate protection for wounds/incisions during mobilization  - Obtain PT/OT consults as needed  - Advance activity as appropriate  - Communicate ordered activity level and limitations with patient/family  Outcome: Progressing     Problem: NEUROLOGICAL - ADULT  Goal: Achieves stable or improved neurological status  Description: INTERVENTIONS  - Assess for and report changes in neurological status  - Initiate measures to prevent increased intracranial pressure  - Maintain blood pressure and fluid volume within ordered parameters to optimize cerebral perfusion and minimize risk of hemorrhage  - Monitor temperature, glucose, and sodium. Initiate appropriate interventions as ordered  Outcome: Progressing     Patient is alert and oriented x 3. Forgetful, constantly repeats herself. On room air. Vitals stable. Afebrile. Heparin on hold. On general diet. Fluid restriction of 1200 mL/day. Incontinent. Purewick changed multiple times. Pain managed with MS IR PRN, lidocaine patch applied to lower back, and fentanyl patch to R upper arm. Right chest port - saline locked. MRI of brain and thoracic spine completed. Unable to complete MRI lower back d/t lots of artifacts - MRI will follow up. Up with 2 assist with walker. Sat up in chair today. Fall precautions in place. Call light within reach.  at bedside. Patient and  updated on plan of care.

## 2022-05-12 NOTE — PHYSICAL THERAPY NOTE
PHYSICAL THERAPY TREATMENT NOTE - INPATIENT     Room Number: 840/804-T       Presenting Problem: intractable back pain (Imaging (-) for acute issue)    Problem List  Principal Problem:    Intractable back pain  Active Problems:    Goals of care, counseling/discussion    Advance care planning    Primary malignant neoplasm of lung metastatic to other site [facility])    Bone metastasis ([facility] [address]    Melena    Pneumonia of right lower lobe due to infectious organism      PHYSICAL THERAPY ASSESSMENT   Chart reviewed. RN  approved participation in physical therapy. PPE worn by therapist: mask, gloves and goggles. Patient was wearing a mask during session. Patient presented in bed with 8/10 pain. Patient with good  progress towards goals during this session. Education provided on Physical therapy plan of care and physiological benefits of out of bed mobility. Patient with good carryover. Bed mobility: Mod assist  Transfers: Mod assist  Gait Assistance: Moderate assistance  Distance (ft): 20  Assistive Device: Rolling walker  Pattern: Shuffle          Pt seen daily. Family present;family education provided;all questions and concerns addressed. Mod a for bed mobility and transfer. EOB sitting balance activity with emphasis ob core stabilization. Pt amb 20 ft with RW and mod a;slow raymond. Provided cuing for gait pattern as well as for postural awareness. F/u with chair for pt safety. Ther ex. Patient was left in bed at end of session with all needs in reach. The patient's Approx Degree of Impairment: 50.57% has been calculated based on documentation in the [facility] '6 clicks' Inpatient Basic Mobility Short Form. Research supports that patients with this level of impairment may benefit from  Sub-acute Rehabilitation/ Home with home health PT. RN aware of patient status post session. DISCHARGE RECOMMENDATIONS  PT Discharge Recommendations: Sub-acute rehabilitation     PLAN  PT Treatment Plan: Bed mobility; Endurance;Gait training    SUBJECTIVE  Pt with constant c/o general weakness. OBJECTIVE  Precautions: Bed/chair alarm    WEIGHT BEARING RESTRICTION  Weight Bearing Restriction: None                PAIN ASSESSMENT   Rating: Unable to rate  Location: back pain and RLE spasms (mostly when in sitting position, relieved with standing)  Management Techniques: Activity promotion;Relaxation;Repositioning; Body mechanics;Breathing techniques (received pain meds prior to session)    BALANCE                                                                                                                       Static Sitting: Fair +  Dynamic Sitting: Fair           Static Standing: Fair -  Dynamic Standing: Fair -    ACTIVITY TOLERANCE                         O2 WALK       AM-PAC '6-Clicks' INPATIENT SHORT FORM - BASIC MOBILITY  How much difficulty does the patient currently have. .. Patient Difficulty: Turning over in bed (including adjusting bedclothes, sheets and blankets)?: A Little   Patient Difficulty: Sitting down on and standing up from a chair with arms (e.g., wheelchair, bedside commode, etc.): A Little   Patient Difficulty: Moving from lying on back to sitting on the side of the bed?: A Little   How much help from another person does the patient currently need. .. Help from Another: Moving to and from a bed to a chair (including a wheelchair)?: A Little   Help from Another: Need to walk in hospital room?: A Little   Help from Another: Climbing 3-5 steps with a railing?: A Lot     AM-PAC Score:  Raw Score: 17   Approx Degree of Impairment: 50.57%   Standardized Score (AM-PAC Scale): 42.13   CMS Modifier (G-Code): CK      Additional information:     THERAPEUTIC EXERCISES  Lower Extremity Ankle pumps  Glut sets  Heel slides  Quad sets     Position Supine       Patient End of Session: Up in chair;Call light within reach;RN aware of session/findings;Bracing education provided per handout; All patient questions and concerns addressed    CURRENT GOALS       Patient Goal Patient's self-stated goal is: to be out of pain   Goal #1 Patient is able to demonstrate supine - sit EOB @ level: minimum assistance     Goal #1   Current Status Mod a   Goal #2 Patient is able to demonstrate transfers Sit to/from Stand at assistance level: supervision with walker - rolling     Goal #2  Current Status Mod a   Goal #3 Patient is able to ambulate 150 feet with assist device: walker - rolling at assistance level: supervision   Goal #3   Current Status Pt amb 20 ft with RW and mod a;f/u with chair for pt safety   Goal #4    Goal #4   Current Status    Goal #5 Patient to demonstrate independence with home activity/exercise instructions provided to patient in preparation for discharge.    Goal #5   Current Status In ptogress   Goal #6    Goal #6  Current Status

## 2022-05-12 NOTE — CM/SW NOTE
Spoke with patient , Neha Mazariegos. States that he has not had the chance to follow up with his son about the SNF preference, states that he will likely choose [facility] [id number], however would like to confirm with son first. Neha Mazariegos to call  once confirmed.      KAILEY Barnes, [facility]    [id number]

## 2022-05-13 ENCOUNTER — APPOINTMENT (OUTPATIENT)
Dept: GENERAL RADIOLOGY | Facility: HOSPITAL | Age: 77
End: 2022-05-13
Attending: INTERNAL MEDICINE
Payer: MEDICARE

## 2022-05-13 ENCOUNTER — APPOINTMENT (OUTPATIENT)
Dept: MRI IMAGING | Facility: HOSPITAL | Age: 77
End: 2022-05-13
Attending: INTERNAL MEDICINE
Payer: MEDICARE

## 2022-05-13 NOTE — PLAN OF CARE
Problem: Patient Centered Care  Goal: Patient preferences are identified and integrated in the patient's plan of care  Description: Interventions:  - What would you like us to know as we care for you?   - Provide timely, complete, and accurate information to patient/family  - Incorporate patient and family knowledge, values, beliefs, and cultural backgrounds into the planning and delivery of care  - Encourage patient/family to participate in care and decision-making at the level they choose  - Honor patient and family perspectives and choices  Outcome: Progressing     Problem: Patient/Family Goals  Goal: Patient/Family Long Term Goal  Description: Patient's Long Term Goal: return home    Interventions:  -  monitor and maintain VS and labs WNL  - IV Abx  - imaging studies  - pain management  - PT/OT  - See additional Care Plan goals for specific interventions  Outcome: Progressing  Goal: Patient/Family Short Term Goal  Description: Patient's Short Term Goal: pain well controlled    Interventions:   - fentanyl patch  - lidocaine patch  - MS IR  - pain services  - palliative care consult  - non pharmacologic interventions  - See additional Care Plan goals for specific interventions  Outcome: Progressing     Problem: SAFETY ADULT - FALL  Goal: Free from fall injury  Description: INTERVENTIONS:  - Assess pt frequently for physical needs  - Identify cognitive and physical deficits and behaviors that affect risk of falls.   - [place] fall precautions as indicated by assessment.  - Educate pt/family on patient safety including physical limitations  - Instruct pt to call for assistance with activity based on assessment  - Modify environment to reduce risk of injury  - Provide assistive devices as appropriate  - Consider OT/PT consult to assist with strengthening/mobility  - Encourage toileting schedule  Outcome: Progressing     Problem: DISCHARGE PLANNING  Goal: Discharge to home or other facility with appropriate resources  Description: INTERVENTIONS:  - Identify barriers to discharge w/pt and caregiver  - Include patient/family/discharge partner in discharge planning  - Arrange for needed discharge resources and transportation as appropriate  - Identify discharge learning needs (meds, wound care, etc)  - Arrange for interpreters to assist at discharge as needed  - Consider post-discharge preferences of patient/family/discharge partner  - Complete POLST form as appropriate  - Assess patient's ability to be responsible for managing their own health  - Refer to Case Management Department for coordinating discharge planning if the patient needs post-hospital services based on physician/LIP order or complex needs related to functional status, cognitive ability or social support system  Outcome: Progressing     Problem: SKIN/TISSUE INTEGRITY - ADULT  Goal: Skin integrity remains intact  Description: INTERVENTIONS  - Assess and document risk factors for pressure ulcer development  - Assess and document skin integrity  - Monitor for areas of redness and/or skin breakdown  - Initiate interventions, skin care algorithm/standards of care as needed  Outcome: Progressing  Goal: Incision(s), wounds(s) or drain site(s) healing without S/S of infection  Description: INTERVENTIONS:  - Assess and document risk factors for pressure ulcer development  - Assess and document skin integrity  - Assess and document dressing/incision, wound bed, drain sites and surrounding tissue  - Implement wound care per orders  - Initiate isolation precautions as appropriate  - Initiate Pressure Ulcer prevention bundle as indicated  Outcome: Progressing     Problem: MUSCULOSKELETAL - ADULT  Goal: Return mobility to safest level of function  Description: INTERVENTIONS:  - Assess patient stability and activity tolerance for standing, transferring and ambulating w/ or w/o assistive devices  - Assist with transfers and ambulation using safe patient handling equipment as needed  - Ensure adequate protection for wounds/incisions during mobilization  - Obtain PT/OT consults as needed  - Advance activity as appropriate  - Communicate ordered activity level and limitations with patient/family  Outcome: Progressing     Problem: NEUROLOGICAL - ADULT  Goal: Achieves stable or improved neurological status  Description: INTERVENTIONS  - Assess for and report changes in neurological status  - Initiate measures to prevent increased intracranial pressure  - Maintain blood pressure and fluid volume within ordered parameters to optimize cerebral perfusion and minimize risk of hemorrhage  - Monitor temperature, glucose, and sodium. Initiate appropriate interventions as ordered  Outcome: Progressing     Patient is alert and oriented x 3. Forgetful, constantly repeats herself. On room air. Vitals stable. Afebrile. Heparin on hold. On general diet. Fluid restriction of 1200 mL/day. Incontinent. Purewick changed multiple times. Pain managed with MS IR PRN, lidocaine patch and fentanyl patch to L upper arm. Right chest port infusing Zosyn. Up with 2 assist with walker and rolling chair. Sat up in chair today. Fall precautions in place. Call light within reach.  at bedside. Patient and  updated on plan of care. Plan for CXR this morning and MRI of lumbar spine pending.

## 2022-05-13 NOTE — CM/SW NOTE
LASHAY met with patient and , Jayjay Patel at bedside. Jayjay Patel confirmed that he would like to go with the location: The [address] [place] [address] . Requested to speak with the [facility] to ask some questions. PLAN: The [address] [place] [address]  pending med clear.        KAILEY Noyola, [facility]    [id number]

## 2022-05-13 NOTE — PHYSICAL THERAPY NOTE
Chart reviewed for treatment, spoke with RN patient going for MRI. Will check back at a later time. Plan is for [facility]/SNF per  as pt still needing a lot of help.

## 2022-05-13 NOTE — SLP NOTE
SPEECH DAILY NOTE - INPATIENT    ASSESSMENT & PLAN   ASSESSMENT  PPE REQUIRED. THIS THERAPIST WORE GLOVES, DROPLET MASK, AND GOGGLES FOR DURATION OF EVALUATION. HANDS WASHED UPON ENTRANCE/EXIT. SLP f/u for ongoing meal assessment per recommendations of regular/thin liquid diet per BSE. RN reports pt tolerates diet and medication well with no overt clinical s/s aspiration. Pt denies any swallowing challenges. Poor appetite reported. Pt positioned upright in bedside chair,  present. Pt afebrile, tolerating room air with oxygen status 95% prior to the start of oral trials. SLP reviewed aspiration precautions and safe swallowing compensatory strategies with the patient. Safe swallow guidelines remain written on the white board in purple. Patient and family v/u. Pt self fed, pt tolerates hard solids and thin liquids via cup with no overt clinical signs/symptoms of aspiration. Swallowing appears WFL, recommend remaining on regular/thin liquid diet and discharging speech services. Please re consult if needed. RN and pt's  alerted with results and recommendations. MOST RECENT CXR 5/11  CONCLUSION:   1. Unfavorable change from July 13, 2021 with left lower lobe consolidation/atelectasis now noted. 2. Cardiomegaly. 3. Atherosclerosis. 4. Prominent markings. 5. Demineralization. 6. Scoliosis. 7. Osteoarthritis. 8. Postop changes in the spine. 9. 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     Diet Recommendations - Solids: Regular (family chooses softer foods)  Diet Recommendations - Liquids: Thin Liquids    Compensatory Strategies Recommended: No straws; Slow rate; Alternate consistencies;Small bites and sips  Aspiration Precautions: Upright position; Slow rate;Small bites and sips; No straw  Medication Administration Recommendations: Whole in puree    Patient Experiencing Pain: No              Discharge Recommendations  Discharge Recommendations/Plan: Undetermined    Treatment Plan  Treatment Plan/Recommendations: Aspiration precautions    Interdisciplinary Communication: Discussed with RN          GOALS  Goal #1 The patient will tolerate regular consistency (family chooses softer foods) and thin liquids without overt signs or symptoms of aspiration with 100 % accuracy over 1 session(s). Goal Met   Goal #2 The patient will utilize compensatory strategies as outlined by  BSSE (clinical evaluation) including Slow rate, Small bites, Small sips, Alternate liquids/solids, No straws, Upright 90 degrees with minimal assistance 100 % of the time across 2 sessions.     Goal Met     FOLLOW UP  Follow Up Needed (Documentation Required): No  SLP Follow-up Date: 05/13/22  Number of Visits to Meet Established Goals: 1    Session: 1    If you have any questions, please contact Hadley Goldmann, SLP

## 2022-05-14 NOTE — CM/SW NOTE
05/14/22 1200   Discharge disposition   Expected discharge disposition [id number]   Discharge transportation [facility]     CM notified by DELONTE Howe that pt will be ready for dc tomorrow 5/15. MD to enter dc order. Bed available 5/15 at 1pm per Montse Castillo. RN to call report: [phone]    **Rapid Covid needed prior to dc. Order entered. DELONTE Howe aware to collect. 1400: Received message from [facility] stating that pts son actually wants [facility] and not [facility] [address]. CM reserved [facility] via [facility] and notified [facility] liaison. Plan:  [facility] arranged for 1pm pickup 5/15. PCS done. PT/Family notified of dc tomorrow at 1pm via Amb via telephone call to son- LM. / to remain available for support and/or discharge planning. Silvano Islas.  Abilio Collazo RN, BSN  Nurse   [phone]

## 2022-05-14 NOTE — PLAN OF CARE
Pt is alert x3, forgetful and repeats info. Pt  at bedside. Pt c/o back pain. Pain managed with Dilaudid, Lidocaine and Fentanyl patch + position changes. Pt had one episode of BM this AM. Pt had one occurrence of high BP. PRN Hydralazine given as prescribed. Discharge to [facility] depending on medical clearance. Safety measures in place. Call light within reach, bed locked in lowest position. Problem: Patient Centered Care  Goal: Patient preferences are identified and integrated in the patient's plan of care  Description: Interventions:  - What would you like us to know as we care for you? To know her medication plan  - Provide timely, complete, and accurate information to patient/family  - Incorporate patient and family knowledge, values, beliefs, and cultural backgrounds into the planning and delivery of care  - Encourage patient/family to participate in care and decision-making at the level they choose  - Honor patient and family perspectives and choices  Outcome: Progressing     Problem: Patient/Family Goals  Goal: Patient/Family Long Term Goal  Description: Patient's Long Term Goal: return home    Interventions:  -  monitor and maintain VS and labs WNL  - IV Abx  - imaging studies  - pain management  - PT/OT  - See additional Care Plan goals for specific interventions  Outcome: Progressing  Goal: Patient/Family Short Term Goal  Description: Patient's Short Term Goal: pain well controlled    Interventions:   - fentanyl patch  - lidocaine patch  - MS IR  - pain services  - palliative care consult  - non pharmacologic interventions  - See additional Care Plan goals for specific interventions  Outcome: Progressing     Problem: SAFETY ADULT - FALL  Goal: Free from fall injury  Description: INTERVENTIONS:  - Assess pt frequently for physical needs  - Identify cognitive and physical deficits and behaviors that affect risk of falls.   - [place] fall precautions as indicated by assessment.  - Educate pt/family on patient safety including physical limitations  - Instruct pt to call for assistance with activity based on assessment  - Modify environment to reduce risk of injury  - Provide assistive devices as appropriate  - Consider OT/PT consult to assist with strengthening/mobility  - Encourage toileting schedule  Outcome: Progressing     Problem: DISCHARGE PLANNING  Goal: Discharge to home or other facility with appropriate resources  Description: INTERVENTIONS:  - Identify barriers to discharge w/pt and caregiver  - Include patient/family/discharge partner in discharge planning  - Arrange for needed discharge resources and transportation as appropriate  - Identify discharge learning needs (meds, wound care, etc)  - Arrange for interpreters to assist at discharge as needed  - Consider post-discharge preferences of patient/family/discharge partner  - Complete POLST form as appropriate  - Assess patient's ability to be responsible for managing their own health  - Refer to Case Management Department for coordinating discharge planning if the patient needs post-hospital services based on physician/LIP order or complex needs related to functional status, cognitive ability or social support system  Outcome: Progressing     Problem: SKIN/TISSUE INTEGRITY - ADULT  Goal: Skin integrity remains intact  Description: INTERVENTIONS  - Assess and document risk factors for pressure ulcer development  - Assess and document skin integrity  - Monitor for areas of redness and/or skin breakdown  - Initiate interventions, skin care algorithm/standards of care as needed  Outcome: Progressing  Goal: Incision(s), wounds(s) or drain site(s) healing without S/S of infection  Description: INTERVENTIONS:  - Assess and document risk factors for pressure ulcer development  - Assess and document skin integrity  - Assess and document dressing/incision, wound bed, drain sites and surrounding tissue  - Implement wound care per orders  - Initiate isolation precautions as appropriate  - Initiate Pressure Ulcer prevention bundle as indicated  Outcome: Progressing     Problem: MUSCULOSKELETAL - ADULT  Goal: Return mobility to safest level of function  Description: INTERVENTIONS:  - Assess patient stability and activity tolerance for standing, transferring and ambulating w/ or w/o assistive devices  - Assist with transfers and ambulation using safe patient handling equipment as needed  - Ensure adequate protection for wounds/incisions during mobilization  - Obtain PT/OT consults as needed  - Advance activity as appropriate  - Communicate ordered activity level and limitations with patient/family  Outcome: Progressing

## 2022-05-14 NOTE — PROGRESS NOTES
Patient alert and oriented X3, can be drowsy and forgetful at times. Patient with poor appetite. Sent for KATRINA, xanax given. Bowel regimen initiated, patient successful with bowel movement. Up to chair this am and now resting in bed. Tylenol for headache. Family at bedside.

## 2022-05-14 NOTE — PLAN OF CARE
Discussed plan of care for day, including all given medications and their indications. Intermittently drowsy/lethargic -- oral PRN Dilaudid withheld as patient did not express need. Oral Tylenol given this evening for headache. IV Zosyn maintained. IV Reglan regimen also maintained -- had bout of nausea early this evening -- plan to transition to oral Reglan at discharge. Fluid restriction maintained -- eating small amounts for each meal with encouragement. Comfort measures encouraged to promote restful environment. Plan for discharge to the [facility] tomorrow afternoon AT 1:00 pm if appropriate. Problem: Patient Centered Care  Goal: Patient preferences are identified and integrated in the patient's plan of care  Description: Interventions:  - What would you like us to know as we care for you?  I live at home with my .  - Provide timely, complete, and accurate information to patient/family  - Incorporate patient and family knowledge, values, beliefs, and cultural backgrounds into the planning and delivery of care  - Encourage patient/family to participate in care and decision-making at the level they choose  - Honor patient and family perspectives and choices  Outcome: Progressing     Problem: Patient/Family Goals  Goal: Patient/Family Long Term Goal  Description: Patient's Long Term Goal: return home    Interventions:  -  monitor and maintain VS and labs WNL  - IV Abx  - imaging studies  - pain management  - PT/OT  - See additional Care Plan goals for specific interventions  Outcome: Progressing  Goal: Patient/Family Short Term Goal  Description: Patient's Short Term Goal: pain well controlled    Interventions:   - fentanyl patch  - lidocaine patch  - MS IR  - pain services  - palliative care consult  - non pharmacologic interventions  - See additional Care Plan goals for specific interventions  Outcome: Progressing     Problem: SAFETY ADULT - FALL  Goal: Free from fall injury  Description: INTERVENTIONS:  - Assess pt frequently for physical needs  - Identify cognitive and physical deficits and behaviors that affect risk of falls.   - [place] fall precautions as indicated by assessment.  - Educate pt/family on patient safety including physical limitations  - Instruct pt to call for assistance with activity based on assessment  - Modify environment to reduce risk of injury  - Provide assistive devices as appropriate  - Consider OT/PT consult to assist with strengthening/mobility  - Encourage toileting schedule  Outcome: Progressing     Problem: DISCHARGE PLANNING  Goal: Discharge to home or other facility with appropriate resources  Description: INTERVENTIONS:  - Identify barriers to discharge w/pt and caregiver  - Include patient/family/discharge partner in discharge planning  - Arrange for needed discharge resources and transportation as appropriate  - Identify discharge learning needs (meds, wound care, etc)  - Arrange for interpreters to assist at discharge as needed  - Consider post-discharge preferences of patient/family/discharge partner  - Complete POLST form as appropriate  - Assess patient's ability to be responsible for managing their own health  - Refer to Case Management Department for coordinating discharge planning if the patient needs post-hospital services based on physician/LIP order or complex needs related to functional status, cognitive ability or social support system  Outcome: Progressing     Problem: SKIN/TISSUE INTEGRITY - ADULT  Goal: Skin integrity remains intact  Description: INTERVENTIONS  - Assess and document risk factors for pressure ulcer development  - Assess and document skin integrity  - Monitor for areas of redness and/or skin breakdown  - Initiate interventions, skin care algorithm/standards of care as needed  Outcome: Progressing  Goal: Incision(s), wounds(s) or drain site(s) healing without S/S of infection  Description: INTERVENTIONS:  - Assess and document risk factors for pressure ulcer development  - Assess and document skin integrity  - Assess and document dressing/incision, wound bed, drain sites and surrounding tissue  - Implement wound care per orders  - Initiate isolation precautions as appropriate  - Initiate Pressure Ulcer prevention bundle as indicated  Outcome: Progressing     Problem: MUSCULOSKELETAL - ADULT  Goal: Return mobility to safest level of function  Description: INTERVENTIONS:  - Assess patient stability and activity tolerance for standing, transferring and ambulating w/ or w/o assistive devices  - Assist with transfers and ambulation using safe patient handling equipment as needed  - Ensure adequate protection for wounds/incisions during mobilization  - Obtain PT/OT consults as needed  - Advance activity as appropriate  - Communicate ordered activity level and limitations with patient/family  Outcome: Progressing     Problem: NEUROLOGICAL - ADULT  Goal: Achieves stable or improved neurological status  Description: INTERVENTIONS  - Assess for and report changes in neurological status  - Initiate measures to prevent increased intracranial pressure  - Maintain blood pressure and fluid volume within ordered parameters to optimize cerebral perfusion and minimize risk of hemorrhage  - Monitor temperature, glucose, and sodium. Initiate appropriate interventions as ordered  Outcome: Progressing     All efforts maintained to promote infection prevention during my assessment and care for this patient. Stethoscope cleansed and hand hygiene strictly maintained.

## 2022-05-15 NOTE — PLAN OF CARE
Patient drowsy at times but more alert than previous night. Pain managed with fentanyl patch. Half a dose of PO dilaudid requested by patient early this morning. Lidocaine patch also placed this morning. Patient was able to eat a small amount before bedtime, no complaints of nausea/vomiting. Up with max assist x2, stand/pivot. Purewick in place. This morning patient stating she was unable to pee. Bladder scan with 664ml. Assisted patient to bathroom and voided only 200ml. Straight cath ~400ml. Will continue to monitor. Problem: Patient Centered Care  Goal: Patient preferences are identified and integrated in the patient's plan of care  Description: Interventions:  - What would you like us to know as we care for you?  I live at home with my .  - Provide timely, complete, and accurate information to patient/family  - Incorporate patient and family knowledge, values, beliefs, and cultural backgrounds into the planning and delivery of care  - Encourage patient/family to participate in care and decision-making at the level they choose  - Honor patient and family perspectives and choices  Outcome: Progressing     Problem: Patient/Family Goals  Goal: Patient/Family Long Term Goal  Description: Patient's Long Term Goal: return home    Interventions:  -  monitor and maintain VS and labs WNL  - IV Abx  - imaging studies  - pain management  - PT/OT  - See additional Care Plan goals for specific interventions  Outcome: Progressing  Goal: Patient/Family Short Term Goal  Description: Patient's Short Term Goal: pain well controlled    Interventions:   - fentanyl patch  - lidocaine patch  - MS IR  - pain services  - palliative care consult  - non pharmacologic interventions  - See additional Care Plan goals for specific interventions  Outcome: Progressing     Problem: SAFETY ADULT - FALL  Goal: Free from fall injury  Description: INTERVENTIONS:  - Assess pt frequently for physical needs  - Identify cognitive and physical deficits and behaviors that affect risk of falls.   - [place] fall precautions as indicated by assessment.  - Educate pt/family on patient safety including physical limitations  - Instruct pt to call for assistance with activity based on assessment  - Modify environment to reduce risk of injury  - Provide assistive devices as appropriate  - Consider OT/PT consult to assist with strengthening/mobility  - Encourage toileting schedule  Outcome: Progressing     Problem: DISCHARGE PLANNING  Goal: Discharge to home or other facility with appropriate resources  Description: INTERVENTIONS:  - Identify barriers to discharge w/pt and caregiver  - Include patient/family/discharge partner in discharge planning  - Arrange for needed discharge resources and transportation as appropriate  - Identify discharge learning needs (meds, wound care, etc)  - Arrange for interpreters to assist at discharge as needed  - Consider post-discharge preferences of patient/family/discharge partner  - Complete POLST form as appropriate  - Assess patient's ability to be responsible for managing their own health  - Refer to Case Management Department for coordinating discharge planning if the patient needs post-hospital services based on physician/LIP order or complex needs related to functional status, cognitive ability or social support system  Outcome: Progressing     Problem: SKIN/TISSUE INTEGRITY - ADULT  Goal: Skin integrity remains intact  Description: INTERVENTIONS  - Assess and document risk factors for pressure ulcer development  - Assess and document skin integrity  - Monitor for areas of redness and/or skin breakdown  - Initiate interventions, skin care algorithm/standards of care as needed  Outcome: Progressing  Goal: Incision(s), wounds(s) or drain site(s) healing without S/S of infection  Description: INTERVENTIONS:  - Assess and document risk factors for pressure ulcer development  - Assess and document skin integrity  - Assess and document dressing/incision, wound bed, drain sites and surrounding tissue  - Implement wound care per orders  - Initiate isolation precautions as appropriate  - Initiate Pressure Ulcer prevention bundle as indicated  Outcome: Progressing     Problem: MUSCULOSKELETAL - ADULT  Goal: Return mobility to safest level of function  Description: INTERVENTIONS:  - Assess patient stability and activity tolerance for standing, transferring and ambulating w/ or w/o assistive devices  - Assist with transfers and ambulation using safe patient handling equipment as needed  - Ensure adequate protection for wounds/incisions during mobilization  - Obtain PT/OT consults as needed  - Advance activity as appropriate  - Communicate ordered activity level and limitations with patient/family  Outcome: Progressing     Problem: NEUROLOGICAL - ADULT  Goal: Achieves stable or improved neurological status  Description: INTERVENTIONS  - Assess for and report changes in neurological status  - Initiate measures to prevent increased intracranial pressure  - Maintain blood pressure and fluid volume within ordered parameters to optimize cerebral perfusion and minimize risk of hemorrhage  - Monitor temperature, glucose, and sodium.  Initiate appropriate interventions as ordered  Outcome: Progressing

## 2022-05-15 NOTE — CM/SW NOTE
Received notice from pt's RN - pt is no longer cleared for DC today. RN confirmed she will call [facility] and cancel transport today and place on WILL CALL for Monday 5/16. PCS completed and will need date added day of actual DC. SW spoke to liaison Crissy and confirmed final [facility] choice from  last night was [facility]. PLAN: [facility], Ambulance on WILL CALL, [place] completed (needs date) - pending med clear      LASHAY/MARGARITO to remain available for support and/or discharge planning.        Leila Addison, MSW, [address] [facility]

## 2022-05-15 NOTE — PLAN OF CARE
Discussed plan of care for day, including all given medications and their indications. Patient noted to have increased restlessness and back pain today -- urinary retention continued. Darling catheter placed this afternoon. Pain managed with fentanyl patch and decreased dose oral Dilaudid PRN. WBC count increased to 30.7 -- Infectious disease MD consulted -- IV Zosyn maintained. Scheduled Reglan IV also maintained. Plan to closely monitor. Discharge to SNF on hold for now. Family is considering hospice care -- they wish to speak with Dr. Manjit Stack tomorrow. Comfort measures encouraged to promote restful environment. Problem: Patient Centered Care  Goal: Patient preferences are identified and integrated in the patient's plan of care  Description: Interventions:  - What would you like us to know as we care for you?  I live at home with my .  - Provide timely, complete, and accurate information to patient/family  - Incorporate patient and family knowledge, values, beliefs, and cultural backgrounds into the planning and delivery of care  - Encourage patient/family to participate in care and decision-making at the level they choose  - Honor patient and family perspectives and choices  Outcome: Progressing     Problem: Patient/Family Goals  Goal: Patient/Family Long Term Goal  Description: Patient's Long Term Goal: return home    Interventions:  -  monitor and maintain VS and labs WNL  - IV Abx  - imaging studies  - pain management  - PT/OT  - See additional Care Plan goals for specific interventions  Outcome: Progressing  Goal: Patient/Family Short Term Goal  Description: Patient's Short Term Goal: pain well controlled    Interventions:   - fentanyl patch  - lidocaine patch  - MS IR  - pain services  - palliative care consult  - non pharmacologic interventions  - See additional Care Plan goals for specific interventions  Outcome: Progressing     Problem: SAFETY ADULT - FALL  Goal: Free from fall injury  Description: INTERVENTIONS:  - Assess pt frequently for physical needs  - Identify cognitive and physical deficits and behaviors that affect risk of falls.   - [place] fall precautions as indicated by assessment.  - Educate pt/family on patient safety including physical limitations  - Instruct pt to call for assistance with activity based on assessment  - Modify environment to reduce risk of injury  - Provide assistive devices as appropriate  - Consider OT/PT consult to assist with strengthening/mobility  - Encourage toileting schedule  Outcome: Progressing     Problem: DISCHARGE PLANNING  Goal: Discharge to home or other facility with appropriate resources  Description: INTERVENTIONS:  - Identify barriers to discharge w/pt and caregiver  - Include patient/family/discharge partner in discharge planning  - Arrange for needed discharge resources and transportation as appropriate  - Identify discharge learning needs (meds, wound care, etc)  - Arrange for interpreters to assist at discharge as needed  - Consider post-discharge preferences of patient/family/discharge partner  - Complete POLST form as appropriate  - Assess patient's ability to be responsible for managing their own health  - Refer to Case Management Department for coordinating discharge planning if the patient needs post-hospital services based on physician/LIP order or complex needs related to functional status, cognitive ability or social support system  Outcome: Progressing     Problem: SKIN/TISSUE INTEGRITY - ADULT  Goal: Skin integrity remains intact  Description: INTERVENTIONS  - Assess and document risk factors for pressure ulcer development  - Assess and document skin integrity  - Monitor for areas of redness and/or skin breakdown  - Initiate interventions, skin care algorithm/standards of care as needed  Outcome: Progressing  Goal: Incision(s), wounds(s) or drain site(s) healing without S/S of infection  Description: INTERVENTIONS:  - Assess and document risk factors for pressure ulcer development  - Assess and document skin integrity  - Assess and document dressing/incision, wound bed, drain sites and surrounding tissue  - Implement wound care per orders  - Initiate isolation precautions as appropriate  - Initiate Pressure Ulcer prevention bundle as indicated  Outcome: Progressing     Problem: MUSCULOSKELETAL - ADULT  Goal: Return mobility to safest level of function  Description: INTERVENTIONS:  - Assess patient stability and activity tolerance for standing, transferring and ambulating w/ or w/o assistive devices  - Assist with transfers and ambulation using safe patient handling equipment as needed  - Ensure adequate protection for wounds/incisions during mobilization  - Obtain PT/OT consults as needed  - Advance activity as appropriate  - Communicate ordered activity level and limitations with patient/family  Outcome: Progressing     Problem: NEUROLOGICAL - ADULT  Goal: Achieves stable or improved neurological status  Description: INTERVENTIONS  - Assess for and report changes in neurological status  - Initiate measures to prevent increased intracranial pressure  - Maintain blood pressure and fluid volume within ordered parameters to optimize cerebral perfusion and minimize risk of hemorrhage  - Monitor temperature, glucose, and sodium. Initiate appropriate interventions as ordered  Outcome: Progressing     All efforts maintained to promote infection prevention during my assessment and care for this patient. Stethoscope cleansed and hand hygiene strictly maintained.

## 2022-05-16 ENCOUNTER — HOSPITAL ENCOUNTER (INPATIENT)
Facility: HOSPITAL | Age: 77
End: 2022-05-16
Attending: HOSPITALIST | Admitting: HOSPITALIST
Payer: OTHER MISCELLANEOUS

## 2022-05-16 PROBLEM — C34.90 LUNG CANCER (HCC): Status: ACTIVE | Noted: 2022-01-01

## 2022-05-16 PROBLEM — C34.90 LUNG CANCER (HCC): Status: ACTIVE | Noted: 2022-05-16

## 2022-05-16 NOTE — PLAN OF CARE
Problem: Patient Centered Care  Goal: Patient preferences are identified and integrated in the patient's plan of care  Description: Interventions:  - What would you like us to know as we care for you? I live at home with my .  - Provide timely, complete, and accurate information to patient/family  - Incorporate patient and family knowledge, values, beliefs, and cultural backgrounds into the planning and delivery of care  - Encourage patient/family to participate in care and decision-making at the level they choose  - Honor patient and family perspectives and choices  Outcome: Progressing     Problem: Patient/Family Goals  Goal: Patient/Family Long Term Goal  Description: Patient's Long Term Goal: return home    Interventions:  -  monitor and maintain VS and labs WNL  - IV Abx  - imaging studies  - pain management  - PT/OT  - See additional Care Plan goals for specific interventions  Outcome: Progressing  Goal: Patient/Family Short Term Goal  Description: Patient's Short Term Goal: pain well controlled    Interventions:   - fentanyl patch  - lidocaine patch  - MS IR  - pain services  - palliative care consult  - non pharmacologic interventions  - See additional Care Plan goals for specific interventions  Outcome: Progressing     Problem: SAFETY ADULT - FALL  Goal: Free from fall injury  Description: INTERVENTIONS:  - Assess pt frequently for physical needs  - Identify cognitive and physical deficits and behaviors that affect risk of falls.   - [place] fall precautions as indicated by assessment.  - Educate pt/family on patient safety including physical limitations  - Instruct pt to call for assistance with activity based on assessment  - Modify environment to reduce risk of injury  - Provide assistive devices as appropriate  - Consider OT/PT consult to assist with strengthening/mobility  - Encourage toileting schedule  Outcome: Progressing     Problem: DISCHARGE PLANNING  Goal: Discharge to home or other facility with appropriate resources  Description: INTERVENTIONS:  - Identify barriers to discharge w/pt and caregiver  - Include patient/family/discharge partner in discharge planning  - Arrange for needed discharge resources and transportation as appropriate  - Identify discharge learning needs (meds, wound care, etc)  - Arrange for interpreters to assist at discharge as needed  - Consider post-discharge preferences of patient/family/discharge partner  - Complete POLST form as appropriate  - Assess patient's ability to be responsible for managing their own health  - Refer to Case Management Department for coordinating discharge planning if the patient needs post-hospital services based on physician/LIP order or complex needs related to functional status, cognitive ability or social support system  Outcome: Progressing     Problem: SKIN/TISSUE INTEGRITY - ADULT  Goal: Skin integrity remains intact  Description: INTERVENTIONS  - Assess and document risk factors for pressure ulcer development  - Assess and document skin integrity  - Monitor for areas of redness and/or skin breakdown  - Initiate interventions, skin care algorithm/standards of care as needed  Outcome: Progressing  Goal: Incision(s), wounds(s) or drain site(s) healing without S/S of infection  Description: INTERVENTIONS:  - Assess and document risk factors for pressure ulcer development  - Assess and document skin integrity  - Assess and document dressing/incision, wound bed, drain sites and surrounding tissue  - Implement wound care per orders  - Initiate isolation precautions as appropriate  - Initiate Pressure Ulcer prevention bundle as indicated  Outcome: Progressing     Problem: MUSCULOSKELETAL - ADULT  Goal: Return mobility to safest level of function  Description: INTERVENTIONS:  - Assess patient stability and activity tolerance for standing, transferring and ambulating w/ or w/o assistive devices  - Assist with transfers and ambulation using safe patient handling equipment as needed  - Ensure adequate protection for wounds/incisions during mobilization  - Obtain PT/OT consults as needed  - Advance activity as appropriate  - Communicate ordered activity level and limitations with patient/family  Outcome: Progressing     Problem: NEUROLOGICAL - ADULT  Goal: Achieves stable or improved neurological status  Description: INTERVENTIONS  - Assess for and report changes in neurological status  - Initiate measures to prevent increased intracranial pressure  - Maintain blood pressure and fluid volume within ordered parameters to optimize cerebral perfusion and minimize risk of hemorrhage  - Monitor temperature, glucose, and sodium. Initiate appropriate interventions as ordered  Outcome: Progressing     Patient received lethargic, sleeping between care. VSS on room air. Pain with movement. At time of bedtime meds, patient was too lethargic to take pills. Patient became more alert at [phone], requesting medication. She feels pain \"all over\" in her \"knees and back\". Family at bedside also noticing increased restlessness. Bedside check for dysphagia and patient has very weak cough on command. Patient placed NPO and SLP consult ordered. The only pain medication available on MAR is PO Dilaudid. Will reach out to MD on call to discuss options.

## 2022-05-16 NOTE — HOSPICE RN NOTE
[facility] met with patient's  Valdemar Hi and son Corrin Curling for informational meeting out of patient's room. RH discussed medicare hospice benefit, goals of care, levels of hospice care, medications used to treat symptoms related to end of life, revocation, medicare coverage. All questions encouraged and answered. Care companion book reviewed, consents signed by Marlyn Ramsay. Case discussed with Dr. Anastacio Flaherty and Dr. Fabrice Erickson who indicate patient to be admitted to [facility] level of hospice for aggressive pain management with IV dilaudid prn or initiation of dilaudid drip if needed. Hospice diagnosis of Lung Cancer with metastasis. Comfort kit order set ordered, chart flipped. PPS: 10    Pt initially diagnosed with Lung cancer Jan of 2020 with metastasis on presentation to brain and bone. Pt underwent treatment but was found to have disease progression on recent PET scan 03/2022. Pt started on new treatments on 4/29/2022. After receiving immunotherapy on 5/6/2022 pt had severe R leg pain and was brought to ER by family. Pt continued to decline over course of hospitalization and is now lethargic and unable to tolerate PO. Pt also continues to c/o severe generalized pain. Due to patient's decline, she is no longer a candidate for further therapy at this time. Family does not want to pursue artificial means of nutrition and wants to focus on comfort and pain control at this time. Pt requires frequent assessment by skilled nurse for medication administration/titration and for the observation of and interventions for all current and potential symptoms related to EOL. All questions encouraged and answered, will continue to monitor for comfort. [facility] to continue to offer services and provide support to patient and family as needed. POC discussed with RN, Dai Degroot.       Oneal Zarate RN, BSN  [facility] Nurse Liaison  Office: [phone] or After Hours: [phone]

## 2022-05-16 NOTE — PROGRESS NOTES
05/16/22 1619   Clinical Encounter Type   Visited With Patient and family together   Routine Visit Introduction   Referral From Nurse   Referral To    Yazidism Encounters   Yazidism Needs Prayer   Family Spiritual Encounters   Family Coping Sadness; Accepting   Family Participation in Care Consistently   Family Support During Treatment Consistently   Taxonomy   Intended Effects Demonstrate caring and concern   Methods Offer emotional support; Offer spiritual/Caodaism support   Interventions Explain  role; Acknowledge current situation; Active listening;[place]     Coralee Beason visited with pt and pts family. Observed pt lying in bed and pts family at bedside. Introduced self, offering emotional/spiritual support. Pts family requested prayer and confirmed that the  came earlier. Pt experiencing lung condition. Pt appears to be comfortable. Pts family appear to be sad and coping well. Provided care and concern, active listening, and prayer at bedside. Will remain available for support. Rev. Christi Hilario, [facility] Care Dept.   [id number], 24/7

## 2022-05-16 NOTE — PROGRESS NOTES
Patient remains lethargic; intermittently opens eyes but her eyes do not stay open for long. Family observes intermittent restlessness. Discussed family concerns for patient's restlessness/pain with MD who is hesitant to administer more sedative medication at this time. Lidocaine patches x2 ordered for knee pain, lidocaine patch x1 placed on back. Patient's family updated with plan of care. Patient repositioned.

## 2022-05-17 NOTE — HOSPICE RN NOTE
GIP patient  She had her eyes open slightly and appeared to be restless and having dyspnea  She was using her accessory muscles for breathing at 22/minute  Room air  [facility] RN will give IVP dose of Dilaudid  Not eating or drinking this morning  PPS 10%  Very pale  Son Wang Moore at bedside   We discussed End of Life symptoms for his mom  POC discussed with [facility] RN  [address] [facility] [id number]

## 2022-05-17 NOTE — SLP NOTE
SLP attempt this AM. Per RN, pt transitioned to inpatient hospice. SLP to sign off at this time. Thank you. Annamarie Suarez [facility]  Speech Language Pathologist  Phone Number Ext. [phone]

## 2022-05-17 NOTE — PLAN OF CARE
Patient is lethargic. Does not respond or follow commands. Family at bedside. Pain is managed with IV Dilaudid.

## 2022-05-17 NOTE — HOSPICE RN NOTE
[facility] Inpatient Nursing Rounds: pt visit conducted with  and sons at bedside. Nursing staff at bedside also performing care. Pt initially more awake and alert, eyes open, tracking people in room. Pt moans when turned and was given 0.4mg IVP dilaudid for pain with movement as she was about to be changed (incontinent of BM). Pt has had a total of 0.8mg IVP dilaudid in last 6hrs. RR=12, non labored. On room air, espitia present, PAC in place. Pt appears to be resting comfortably after dilaudid administration, now more somnolent, eyes closed, not waking to verbal command. Discussed POC again with family, all questions answered and encouraged.  is somewhat hopeful that patient's brief wakefulness this evening following prayers is an indication that she may be improving and may be a candidate for rehab in the future. This writer provided education on labile level of consciousness in patients and explained that we will continue to assess for ability to transfer to rehab if pts mentation continues to improve. Family expressed gratitude for services and support provided by [facility] and [place] [id number] staff. Discussed care with night RN, Danay Hale. No further questions at this time, encouraged use of #[phone] number for any needs/questions. Verbalized understanding. POLST form needs MD signature, MD messaged through Epic.

## 2022-05-17 NOTE — HOSPICE RN NOTE
[facility] provided support to son Cruz Schneider at bedside and  Valdemar Hi at bedside. Patient was resting after having IVP dose of Dilaudid. POC discussed with Dai Degroot RN.   Yun Moore RN TNL   [facility] [id number]

## 2022-05-17 NOTE — CM/SW NOTE
KAILEY pardo 5/17/22. MSW met with son at bedside. Pt able to nod in agreement, but appeared in transitional state. Son is managing well, spouse has left to go home to change. PLOST is awaiting Dr. Luis Gomez. MSW provided emotional support to son.   KAILEY See  [facility]  [phone]

## 2022-05-18 NOTE — HOSPICE RN NOTE
[facility] TNL Rounds: patient requiring dilaudid IVP PRN q2h for recurrent pain/moaning/brow furrowing. Per family, agreeable to initiate dilaudid gtt at this time. Jackie De Oliveira RN initiating.  Both RN and family aware of how to contact [facility] team if needed    Madhuri KOHLERN, RN  [facility] TNL  [id number]

## 2022-05-18 NOTE — HOSPICE RN NOTE
GIP Day 3. [facility] TNL assessment. Patient lying in bed lethargic, minimally responsive to verbal or tactile stimuli, primarily not responsive. , Karlos Dickson, and son, Lisa Howell, at bedside. R Mediport Patent. Pt received 0.4mg Dilaudid IVP PRN today at 0251 and 0911 for non-verbal indicators of pain including moaning, facial grimacing, brow furrowing, etc. Yesterday, pt received 6 total IVP PRN dilaudid doses. Scopolamine patch in place. Pt on RA, PPS 10%. Pt appears pale and cachectic, currently not exhibiting any nonverbal indicators of pain/SOB/discomfort. Lethargic, per patients family, opened eyes once this AM and shook head \"yes. \" Upon assessment, RR 20 and shallow with mild accessory muscle use, non-labored at this time. Upon auscultation, diminished BL with RUL minimal coarseness upon expiration. Pt with recurrent lowgrade fevers, not afebrile. Received tylenol overnight. Tachycardic, pulse regular upon palpation. Darling patent with scant david/yellow urine output. No edema noted BUE or BLE. Recommend: 0.4mg robinul IVP PRN mild respiratory congestion    Discussed POC and recommendations with Tian Busch RN - RN verbalized understanding, aware of how to contact team if needed    Spoke with  and son at bedside regarding POC. Discussed considerations for initiating gtt, per family they would like to continue with IVP for now, but will consider based on sx. All questions encouraged and answered, [facility]. Pt remains appropriate for inpatient level of hospice at this time per [facility] Medical Director for continued RN assessments, as well as for frequent administration and titration of IV medications for management of respiratory distress and pain. [facility] to continue to monitor pt, offer services, and provide support to patient and family as needed.        Reese Arvizu BSN, RN  [facility] TNL  [id number]

## 2022-05-18 NOTE — HOSPICE RN NOTE
[facility] TNL Rounds. Patient lying in bed eyes slightly open, not responsive to verbal or tactile stimuli. Son and  at bedside. Dilaudid gtt  increased to 0.4mg/hr at 5pm by hospital RN, IVP PRN dilaudid administered 1356 and 1544 for pain, atropine PRN given 1551 for respiratory congestion, robinul last given 1402. R Chest mediport Patent. O2 at 2L NC for SOB. PPS 10%    Upon assessment, RR 20, shallow, and labored. Pt neck and head moving upward with each breath d/t accessory muscle use. Moderately congested and diminished BL upon auscultation. Darling patent. Axillary temperature taken by TNL at bedside, 100F. Pt warm to touch. Family declines ice packs, requests tylenol suppository instead. Family agreeable with dose of IVP PRN dilaudid now for respiratory distress/laboring, O2 increased to 2.5L for SOB,family agreeable to robinul IVP PRN and second scop patch for congestion management. Recommend: 04mg IVP PRN dilaudid for respiratory distress, tylenol suppository for fever (no ice per family), robinul IVP PRN + second scopolamine patch for recurrent congestion. Discussed POC and recommendations with Ruth Minaya, RN - RN verbalized understanding, aware of how to contact team if needed. Spoke with family at bedside regarding POC. All questions encouraged and answered, aware of [facility] if [facility]. Pt remains appropriate for inpatient level of hospice at this time per [facility] Medical Director for continued RN assessments, as well as for frequent administration and titration of IV medications for management of respiratory distress and pain. [facility] to continue to monitor pt, offer services, and provide support to patient and family as needed.      Guy KOHLERN, RN  [facility] TNL  [id number]

## 2022-05-18 NOTE — CM/SW NOTE
MSW met with spouse Elaine Raymundo and son Darwin De La Rosa in the patient room. Patient's eyes were open and minimal conversation with son who was touching patient. Life review occurred and Elaine Raymundo is hopeful that the patient will have more time. He is optimistic because the patient ate and drank small amount of food/water today. Case coordination an hour later with hospice nurse because the patient was asking for pain medication and appeared to be writhing on the bed. Hospice nurse went to assist hospital nurse in administration of comfort medication.   Fidelina Degroot, [address] [place] [address]

## 2022-05-19 NOTE — HOSPICE RN NOTE
[facility] TNL Rounds. Patient lying in bed no longer responsive to verbal or tactile stimuli. 2 sons and  at bedside. Dilaudid gtt increased to 0.6 mg/hr at 1834 for dyspnea. IV Patent. Since last assessment, pt received 0.4mg IVP PRN Dilaudid at 1822 for respiratory distress/laboring. For recurrent/worsening respiratory congestion, pt received 1822 0.4mg IVP PRN Robinul, 2nd scopolamine patch placed 1822, and PRN atropine drops at 1837. Tylenol suppository administered 1825 for recurrent fever, family declined ice. O2 at 2L NC. PPS 10%. Pt declining rapidly, imminent     Upon assessment, RR 20 and labored, although improved, pt with accessory muscle use in neck and abdomen, as well as lifting head/chin and chest with each breath. Upon auscultation, severe/worsening congestion BL upon inspiration and expiration as compared to previous assessment. Darling patent, no further changes from previous assessment. BP taken by TNL at bedside R Arm: 128/80    Recommend: No hypotension, would recommend IVP PRN Lasix 40mg for severe respiratory congestion, as well as 0.4mg IVP dilaudid for labored breathing/respiratory distress    Spoke with family members at bedside regarding POC, family agreeable with above. All questions encouraged and answered, aware of [facility] if needed. Discussed POC and recommendations with Natali Adames, RN - RN verbalized understanding, aware of how to contact team if needed. Pt remains appropriate for inpatient level of hospice at this time per [facility] Medical Director for continued RN assessments, as well as for frequent administration and titration of IV medications for management of pain, respiratory distress and respiratory congestion. [facility] to continue to monitor pt, offer services, and provide support to patient and family as needed.      Nader Led BSN, RN  [facility] TNL  [id number]

## 2022-05-19 NOTE — PROGRESS NOTES
Patient continues to rest in bed, dilaudid drip infusing. Robinul for secretions. Tylenol for fever. Repositioned. Darling in place. Family at bedside.

## 2022-05-19 NOTE — HOSPICE RN NOTE
[facility] TNL Rounds. Patient lying in bed not responsive to verbal or tactile stimuli.  and son at bedside. Pt's grandchildren visited earlier in day. Dilaudid gtt continues at 0.6 mg/hr. R Chest Mediport site patent. Current O2 at 2L NC continuous. Darling patent w/ scant david output. PPS 10%. Pt imminent. Pt received: IVP PRN Dilaudid 0.4mg for respiratory distress at 0039 and 0339; 0.4mg IVP PRN Robinul for respiratory congestion at 0339, 1030, and 1448; 40mg IVP PRN Lasix 0400 for respiratory congestion/secretions; Atropine drops PRN secretions 0339; Tylenol suppository PRN fever at 1443. 2 scopolamine patches in place, one behind each ear, for severe respiratory congestion and excessive secretions. No PRN medications required since last TNL assessment by Kennedy Rivas RN at 330pm.    Upon assessment, pt appears pale and dusky in appearance. RR 9 and shallow. Moderate accessory muscle use, but does not appear objectively labored at time of assessment. Face expressionless and no nonverbal indicators of discomfort/pain/restlessness at this time. Upon auscultation, lung sounds diminished BL, respiratory congestion significantly improved from previous assessments. Pt with recurrent fevers, appears slightly diaphoretic during assessment. Family reports pt feels warm and would like to take temperature. TNL took axillary temp at bedside, 101F. Family asked for tylenol, expressed med is q6h and not yet due. Discussed that terminal fever is expected, but offered ice under axilla for fever in interim. Family would like to try. Pt tachycardic, pulse weak. No edema noted in BUE or BLE. No further changes from previous assessment. Recommend: Family would like to try small amt of ice under axilla for fever if pt tolerates. Continue to assess for EOL s/sx    Spoke with family at bedside regarding POC. Discussed decrease in LOC -  reviewed this typically indicates hours-days / imminence.  All questions encouraged and answered, aware of how to [facility] if needed. Discussed POC and recommendations with Sandra Herrera, RN - RN verbalized understanding, aware of how to contact team if needed. Pt remains appropriate for inpatient level of hospice at this time per [facility] Medical Director for continued RN assessments, as well as for frequent administration and titration of IV medications for management of respiratory distress, respiratory congestion, and pain. [facility] to continue to monitor pt, offer services, and provide support to patient and family as needed.      Derrick Pickens BSN, RN  [facility] TNL  [id number]

## 2022-05-19 NOTE — HOSPICE RN NOTE
GIP patient  She is not responsive to verbal or tactile stimuli  Shallow irregular breathing with use of accessory muscles   Respirations at 14/minute  Room air  No facial grimace noted  Very pale  NPO  PPS 10%  Lung congestion being managed with IVP Lasix and IVP Robinul  Dilaudid drip is at 0.6mg/hour to manage her dyspnea/pain  POC discussed with Sheppard Boxer the spouse and son Mis Ramirez at bedside  Will discuss with [facility] RN  [address] [facility] [id number]

## 2022-05-19 NOTE — CM/SW NOTE
MSW met with spouse and son at bedside. Family is managing well, albeit sad. MSW provided emotional support, validation. Family denied needs at this time.   KAILEY Duffy  [facility]  [phone]

## 2022-05-20 NOTE — HOSPICE RN NOTE
[facility] TNL Rounds. Patient lying in bed not responsive to verbal or tactile stimuli.  and son still at bedside. Dilaudid gtt continues at 0.6 mg/hr. R Chest MP Patent. O2 at 2L NC. Pt no longer diaphoretic or hot compared to prior assessment, as RN placed ice under BL axilla for recurrent fever 101F. No changes from prior assessment - RR continues to be 9 and regular, but shallow. Moderate accessory muscle use of abdomen and neck still present, but pt does not appear labored. No congestion auscultated, lung sounds remain diminished. Pt appears very pale and dusky, imminent. Will continue to monitor for s/sx of respiratory distress, congestion, pain, anxiety/restlessness/agitation,etc.     Spoke with family at bedside regarding POC. All questions encouraged and answered, aware of how to [facility] if [facility]. Rachel Ravi, RN aware of POC and how to contact team if needed. Pt remains appropriate for inpatient level of hospice at this time per [facility] Medical Director for continued RN assessments, as well as for frequent administration and titration of IV medications for management of respiratory distress and pain. [facility] to continue to monitor pt, offer services, and provide support to patient and family as needed.      Cinthia Velazquez BSN, RN  [facility] TNL  [id number]

## 2022-05-20 NOTE — HOSPICE RN NOTE
[facility] Rounds. Res[facility] RN assessment of patient at bedside. Lung sounds slightly congested throughout upon auscultation. Respirations 18, shallow, on 2L nasal cannula. Patient unresponsive, PPS 10%. Patient grimacing with repositioning. Patient receiving dilaudid gtt 0.6mg/hr IV continuous with doses robinul 0.4mg IV PRN, dilaudid 0.4mg IV PRN, and received 650 acetaminophen rectal suppository for fever 100.9 by facility RN for pain, shortness of breath, restlessness, congestion, and fever not controlled by oral medication. Update given to spouse Vamshi Lerma and son Gomez Souza at bedside and all questions and concerns regarding fever addressed and discussed in length. Cold compresses in place. Update given to [address] who is in agreement with plan of care and will administer robinul 0.4mg IV PRN and dilaudid 0.4mg IV PRN as indicated for shortness of breath and congestion.     Sherice Cardona, [facility] [place]  [phone]

## 2022-05-20 NOTE — PLAN OF CARE
Olivia Maguire is unresponsive. Remains on 2L N/C. Dilaudid gtt continued at 3 ml/hr. IVP dilaudid given for episodes of dyspnea with relief. Family visiting at bedside. Darling remains in place. Comfort measures maintained and safety precautions in place.

## 2022-05-20 NOTE — CM/SW NOTE
MSW, hospice nurse Sherrie Lay and Shanique Landa stopped by the patient room. Talked with spouse and son sitting maguire. No questions or concerns were raised.  Family is grateful for the support of hospice team.  Aj Duke, [address] [place] [address]   [phone]

## 2022-05-20 NOTE — HOSPICE RN NOTE
[facility] Rounds. Res[facility] RN assessment of patient at bedside. Lung sounds diminished throughout upon auscultation. Respirations 16, shallow, on 2L nasal cannula. Patient unresponsive, PPS 10%. Bowel sounds present all 4 quadrants. Patient extremities warm to touch. Patient grimacing with repositioning. Patient receiving dilaudid gtt 0.6mg/hr IV continuous with 5 doses robinul 0.4mg IV PRN and 3 doses dilaudid 0.4mg IV PRN by facility RN for pain, shortness of breath, restlessness, and congestion not controlled with oral medication. Update given to spouse Sherri Morrison and son Clara gNuyen at bedside and all questions and concerns regarding patient temperature and patient comfort addressed and discussed in length. Cold compress in place from previous fever 100.7 now 98.8. Update given to [address] [facility] who is in agreement with plan of care and will continue to administer, monitor, and titrate IV medication as indicated. Per Moriah Armendariz [facility] MD, patient remains appropriate for GIP level of care due to frequent monitoring, titration, and administration of IV medication by hospital RN for pain, shortness of breath, and restlessness not controlled with oral medication.      Canelo Land [facility] [place]  [phone]

## 2022-05-21 NOTE — HOSPICE RN NOTE
Pt passed peacefully with family at bedside. TOD 5PM per Slim Dover RN. During visit, family at bedside and grieving appropriately. Family aware of responsibility to call  home. Family met with  to sign paperwork. Notified to take home any pt belongings. TNL provided support and condolences to family. Informed them they can still contact [facility] for any further needs/questions/support and that [facility] bereavement team will be reaching out within next 2 weeks to offer support. Family verbalized understanding of above and aware of how to contact [facility] Team if needed. No further questions or concerns at this time.        Ike Worthy BSN, RN  [facility] TNL  [id number]

## 2022-05-21 NOTE — SIGNIFICANT EVENT
Pt  at [address]. Resusitation not attempted as pt was DNR. Time of Death: 65  Family Notified: Yes, at bedside.  Name and Relation: spouse Clara Nguyen and son Sherri Morrison  MD notified: Yes, Dr. Tequila Madera [facility] [employer] [facility] Notified: Tim Velez ref # [id number]   contacted if applicable: N/A

## 2022-05-21 NOTE — HOSPICE RN NOTE
GIP Day 4. [facility] TNL assessment. Patient lying in bed not responsive to verbal or tactile stimuli.  at bedside. Dildaudid gtt at 0.6 mg/hr. IV Patent. Pt received 0.4mg robinul IVP PRN at 0224 for respiratory congestion. O2 at 2L NC. PPS 10%. Imminent    Upon assessment, pt appears dusky and pale. RR 16 and shallow, primarily regular with occasional long breath. Moderate - severe accessory muscle use, primarily breathing from abdomen and neck. Head tilting upward with each breath. Moderately labored breathing. Upon auscultation diminished BL, congestion improved. Pt afebrile during assessment. Darling patent with scant yellow/david output. No BUE or BLE noted. Recommend: Increasing dilaudid gtt to 0.8mg/hr for improved control of labored breathing / dyspnea. Decrease O2 to 1L NC for comfort / to decrease nasal dryness. May return to 2L if pt becomes more dyspneic. Spoke with , Kerrycali Walters, regarding POC and recommendations. Kerry Walters agreeable w/ laboring and w/ POC. All questions encouraged and answered, aware of how [facility] if needed. Discussed POC and recommendations with Mariangel Alicia, RN - RN verbalized understanding, aware of how to contact team if needed    Pt remains appropriate for inpatient level of hospice at this time per [facility] Medical Director for continued RN assessments, as well as for frequent administration and titration of IV medications for management of respiratory distress, respiratory congestion, and pain. [facility] to continue to monitor pt, offer services, and provide support to patient and family as needed.

## 2022-05-21 NOTE — CM/SW NOTE
MSW routine visit 5/21/22. MSW provided support to spouse at bedside, where he has been holding maguire. The pt appeared very close to passing. Spouse managing well,albeit sad.   KAILEY Castro Chi  [facility]  [phone]

## 2022-05-21 NOTE — HOSPICE RN NOTE
[facility] Rounds. Residential RN assessment of patient at bedside. Lung sounds diminished throughout upon auscultation. Respirations 16, shallow, on 2L nasal cannula. Patient unresponsive, PPS 10%. Patient grimacing with repositioning. Patient fever 100.9 received acetaminophen 650mg rectal suppository by facility RN and cold compresses in place. Patient receiving dilaudid gtt 0.6mg/hr IV continuous with doses robinul 0.4mg IV PRN and dilaudid 0.4mg IV PRN by facility RN by pain, shortness of breath, restlessness, and congestion not controlled with oral medication. Update given to gurjit Doyle at bedside and all questions and concerns regarding patient fever addressed and discussed in length. Update given to [address] who is in agreement with plan of care and will continue to administer acetaminophen 650mg rectal suppository for fever and will continue to titrate, monitor, and administer IV medication as indicated.      Erick Monsalve, [facility] [place]  [phone]

## 2022-05-21 NOTE — SPIRITUAL CARE NOTE
Spouse Blake Major processed his experience of pt's passing. He spoke a warm and loving tribute to his wife.  met sons Brannon Huff and Jennifer Mueller, who supported father and each other at bedside. Blake Pedrito selected [facility], [address] [facility]. Family is supported by their .      [address] [facility], [phone]

## 2022-05-22 NOTE — DISCHARGE SUMMARY
[facility] HOSPITALIST  DISCHARGE SUMMARY     Antwon Escobedo Patient Status:  Inpatient    1945 MRN [id number]   Location [facility] 4W/SW/SE Attending No att. providers found   Hosp Day # 5 PCP Ivy Mc MD     DATE OF ADMISSION: 2022  DATE OF DISCHARGE: 2022   DISPOSITION:     DISCHARGE DIAGNOSES:  Metastatic lung cancer  Cancer related pain  Hyponatremia  Thrombocytopenia  Possible pneumonia  Urinary retention  Iron deficiency anemia  Hypertension    HISTORY OF PRESENT ILLNESS (COPIED FROM ADMISSION H&P)  Antwon Escobedo is a(n) 68year old female, with past medical history significant for metastatic lung CA with mets to brain and bone including spine status post spinal fusion with history of shifting hardware requiring revision presents with a complaint of severe back pain over the past few days that has been progressively worsening. She denies any recent trauma claims she is trying to be as active as possible however now experiences 10 out of 10 mid/lower back pain radiating down into bilateral lower extremities right greater than left. She is able to ambulate with some discomfort, however does admit to being occasionally incontinent of bladder and bowel. Claims her pain worsens when attempting to lie flat and as a result is usually in the sitting position. HOSPITAL COURSE:  Patient continued to deteriorate and eventually was agreed by family to enter into inpatient hospice. She was kept comfortable, used medications for dyspnea, secretions, pain. She  on , comfort given to family by staff. PHYSICAL EXAM:     Gen: comfortable, asleep  HEENT: NCAT, neck supple, no carotid bruit. CV: RRR, S1S2, and intact distal pulses. No gallop, rub, murmur. Pulm: Effort and breath sounds normal. No distress, wheezes, rales, rhonchi. Abd: Soft, NTND, BS normal, no mass, no HSM, no rebound/guarding. Neuro:  Deferred  MS: No joint effusions.   No peripheral edema.  Skin: Skin is warm and dry. No rashes, erythema, diaphoresis.    Psych:   Deferred

## 2022-05-24 ENCOUNTER — TELEPHONE (OUTPATIENT)
Dept: INTERNAL MEDICINE UNIT | Facility: HOSPITAL | Age: 77
End: 2022-05-24

## 2022-05-24 NOTE — TELEPHONE ENCOUNTER
Death certificate completed and signed by Hospitalist MD Dr. Aspen Yarbrough. Submitted via fax to [phone]. Confirmation fax received.

## 2022-05-27 ENCOUNTER — APPOINTMENT (OUTPATIENT)
Dept: HEMATOLOGY/ONCOLOGY | Facility: HOSPITAL | Age: 77
End: 2022-05-27
Attending: INTERNAL MEDICINE
Payer: COMMERCIAL

## 2022-05-27 ENCOUNTER — APPOINTMENT (OUTPATIENT)
Dept: HEMATOLOGY/ONCOLOGY | Facility: HOSPITAL | Age: 77
End: 2022-05-27
Attending: NURSE PRACTITIONER
Payer: COMMERCIAL

## 2022-06-17 ENCOUNTER — APPOINTMENT (OUTPATIENT)
Dept: HEMATOLOGY/ONCOLOGY | Facility: HOSPITAL | Age: 77
End: 2022-06-17
Attending: INTERNAL MEDICINE
Payer: COMMERCIAL

## 2022-06-17 ENCOUNTER — APPOINTMENT (OUTPATIENT)
Dept: HEMATOLOGY/ONCOLOGY | Facility: HOSPITAL | Age: 77
End: 2022-06-17
Attending: NURSE PRACTITIONER
Payer: COMMERCIAL

## 2022-06-22 ENCOUNTER — TELEPHONE (OUTPATIENT)
Dept: HEMATOLOGY/ONCOLOGY | Facility: HOSPITAL | Age: 77
End: 2022-06-22

## 2024-12-12 NOTE — TELEPHONE ENCOUNTER
Garo ramsey from [facility] called for a drug interaction between the fentanyl patch and mirtazapine. She wanted to make sure that was okay. What Type Of Note Output Would You Prefer (Optional)?: Bullet Format How Severe Is Your Skin Lesion?: moderate Is This A New Presentation, Or A Follow-Up?: Skin Lesion

## 2025-01-27 NOTE — PHYSICAL THERAPY NOTE
Chart reviewed, attempted to see pt for PT tx. Pt transferring off the floor to EGD. Will f/u at a later time as appropriate, schedule permitting.     Rivera Gil, DPT  Physical Therapy  [facility] MIRAGE #[id number] Hypodermic needlestick injury of finger

## (undated) DIAGNOSIS — C34.32 MALIGNANT NEOPLASM OF LOWER LOBE OF LEFT LUNG (HCC): Primary | ICD-10-CM

## (undated) DIAGNOSIS — C34.90 PRIMARY MALIGNANT NEOPLASM OF LUNG METASTATIC TO OTHER SITE, UNSPECIFIED LATERALITY (HCC): ICD-10-CM

## (undated) DIAGNOSIS — C79.51 METASTASIS TO VERTEBRAL COLUMN OF UNKNOWN ORIGIN (HCC): ICD-10-CM

## (undated) DIAGNOSIS — C80.1 METASTASIS TO VERTEBRAL COLUMN OF UNKNOWN ORIGIN (HCC): Primary | ICD-10-CM

## (undated) DIAGNOSIS — C79.51 BONE METASTASIS (HCC): ICD-10-CM

## (undated) DIAGNOSIS — C79.51 METASTASIS TO VERTEBRAL COLUMN OF UNKNOWN ORIGIN (HCC): Primary | ICD-10-CM

## (undated) DIAGNOSIS — D63.8 ANEMIA IN OTHER CHRONIC DISEASES CLASSIFIED ELSEWHERE: Primary | ICD-10-CM

## (undated) DIAGNOSIS — C34.32 MALIGNANT NEOPLASM OF LOWER LOBE OF LEFT LUNG (HCC): ICD-10-CM

## (undated) DIAGNOSIS — G89.3 CANCER RELATED PAIN: ICD-10-CM

## (undated) DIAGNOSIS — Z45.2 ENCOUNTER FOR CENTRAL LINE CARE: Primary | ICD-10-CM

## (undated) DIAGNOSIS — R23.8 SKIN BREAKDOWN: ICD-10-CM

## (undated) DIAGNOSIS — R30.0 BURNING WITH URINATION: Primary | ICD-10-CM

## (undated) DIAGNOSIS — D64.9 ANEMIA: ICD-10-CM

## (undated) DIAGNOSIS — C80.1 METASTASIS TO VERTEBRAL COLUMN OF UNKNOWN ORIGIN (HCC): ICD-10-CM

## (undated) DIAGNOSIS — C79.31 BRAIN METASTASIS (HCC): ICD-10-CM

## (undated) DIAGNOSIS — I10 ESSENTIAL HYPERTENSION: ICD-10-CM

## (undated) DIAGNOSIS — M84.58XD PATHOLOGICAL FRACTURE OF VERTEBRAE IN NEOPLASTIC DISEASE WITH ROUTINE HEALING: Primary | ICD-10-CM

## (undated) DIAGNOSIS — Z51.81 ENCOUNTER FOR MEDICATION MONITORING: ICD-10-CM

## (undated) DEVICE — LAMINECTOMY: Brand: MEDLINE INDUSTRIES, INC.

## (undated) DEVICE — DEV STRATAFIX PDS + SUTR 0 CTX

## (undated) DEVICE — 3.0MM PRECISION NEURO (MATCH HEAD)

## (undated) DEVICE — DRAPE SHEET LG

## (undated) DEVICE — FRAZIER SUCTION INSTRUMENT 12 FR W/CONTROL VENT & OBTURATOR: Brand: FRAZIER

## (undated) DEVICE — GAMMEX® PI HYBRID SIZE 8, STERILE POWDER-FREE SURGICAL GLOVE, POLYISOPRENE AND NEOPRENE BLEND: Brand: GAMMEX

## (undated) DEVICE — GAUZE SPONGES,12 PLY: Brand: CURITY

## (undated) DEVICE — KIT ENDO ORCAPOD 160/180/190

## (undated) DEVICE — CONMED SCOPE SAVER BITE BLOCK, 20X27 MM: Brand: SCOPE SAVER

## (undated) DEVICE — TRAP MCS 40ML 5IN PLS SCR CAP

## (undated) DEVICE — C-ARMOR C-ARM EQUIPMENT COVERS CLEAR STERILE UNIVERSAL FIT 12 PER CASE: Brand: C-ARMOR

## (undated) DEVICE — FLOSEAL HEMOSTATIC MATRIX, 5ML: Brand: FLOSEAL HEMOSTATIC MATRIX

## (undated) DEVICE — Device

## (undated) DEVICE — INSULATED BLADE ELECTRODE 6.5

## (undated) DEVICE — DRAIN SILICONE FLAT 7MM

## (undated) DEVICE — PERIFIX® 18 GA. X 3-1/2 IN. (90 MM) TUOHY, 5 ML GLASS LUER LOCK LOR TRAY (KIT): Brand: PERIFIX®

## (undated) DEVICE — CAUTERY BLADE 2IN INS E1455

## (undated) DEVICE — 3M(TM) TEGADERM(TM) TRANSPARENT FILM DRESSING FRAME STYLE 1628: Brand: 3M™ TEGADERM™

## (undated) DEVICE — SUTURE VICRYL 0 CT-1

## (undated) DEVICE — DRAIN RESERVOIR RELIAVAC 100CC

## (undated) DEVICE — TOWEL SURG OR 17X30IN BLUE

## (undated) DEVICE — DRAPE SRG 90X60IN BCK TBL CVR

## (undated) DEVICE — UNDYED BRAIDED (POLYGLACTIN 910), SYNTHETIC ABSORBABLE SUTURE: Brand: COATED VICRYL

## (undated) DEVICE — MEDI-VAC NON-CONDUCTIVE SUCTION TUBING 6MM X 1.8M (6FT.) L: Brand: CARDINAL HEALTH

## (undated) DEVICE — KIT CLEAN ENDOKIT 1.1OZ GOWNX2

## (undated) DEVICE — PEN: MARKING STD PT 100/CS: Brand: MEDICAL ACTION INDUSTRIES

## (undated) DEVICE — 3 ML SYRINGE LUER-LOCK TIP: Brand: MONOJECT

## (undated) DEVICE — LINE MNTR ADLT SET O2 INTMD

## (undated) DEVICE — CHLORAPREP ORANGE TINT 10.5ML

## (undated) DEVICE — STERILE (10.2 X 147CM) TELESCOPICALLY-FOLDED COVER: Brand: CIV-FLEX™ TRANSDUCER COVER

## (undated) DEVICE — SOL  .9 1000ML BTL

## (undated) DEVICE — 6 ML SYRINGE LUER-LOCK TIP: Brand: MONOJECT

## (undated) DEVICE — FORCEP RADIAL JAW 4

## (undated) DEVICE — PROXIMATE SKIN STAPLERS (35 WIDE) CONTAINS 35 STAINLESS STEEL STAPLES (FIXED HEAD): Brand: PROXIMATE

## (undated) DEVICE — 35 ML SYRINGE REGULAR TIP: Brand: MONOJECT

## (undated) DEVICE — OMNIPAQUE 240ML VIAL

## (undated) DEVICE — SUTURE VICRYL 2-0 CT-2

## (undated) DEVICE — OCCLUSIVE GAUZE STRIP,3% BISMUTH TRIBROMOPHENATE IN PETROLATUM BLEND: Brand: XEROFORM

## (undated) DEVICE — KIT DRN 1/8IN PVC 3 SPRG EVAC

## (undated) NOTE — IP AVS SNAPSHOT
[facility]            (For Outpatient Use Only) Initial Admit Date: 3/3/2021   Inpt/Obs Admit Date: Inpt: 3/3/21 / Obs: N/A   Discharge Date:    Alexandre Velasquez:  [de-identified]   MRN: [de-identified]   CSN: [id number]   CEID: [id number]        [facility] Payor:  Plan:    Group Number:  Insurance Type:    Subscriber Name:  Subscriber :    Subscriber ID:  Pt Rel to Subscriber:    Hospital Account Financial Class: Medicare    2021

## (undated) NOTE — LETTER
Hospital Discharge Documentation  Please phone to schedule a hospital follow up appointment.     From: [address] [facility] Office  Phone: [phone]    Patient discharged time/date: 2/27/2021  4:45 PM  Patient discharge disposition:  [address] HPI Per Admitting Physician: The patient is a 68-year-old female with a past medical history of metastatic lung cancer who currently is status post stereotactic radiosurgery for L1 metastases, here with complaint of refractory pain in the lumbar area.   The Us Venous Doppler Leg Bilat - Diag Img (cpt=93970)    Result Date: 2/16/2021  CONCLUSION: Normal examination.      Dictated by (CST): Oriana Carrasco MD on 2/16/2021 at 3:09 PM     Finalized by (CST): Oriana Carrasco MD on 2/16/2021 at 3:10 CREATSERUM 0.54*  --  0.54* 0.46* 0.62   GFRAA 106  --  106 112 101   GFRNAA 92  --  92 97 88   CA 8.6  --  8.8 8.9 9.4   ALB 2.7*  --   --  3.1* 3.1*   *  --  128* 127* 138   K  --  4.3 4.3 4.0 4.2   CL 97*  --  96* 95* 104   CO2 31.0  --  28.0 27. 0 What changed: when to take this      Take 2 tablets by mouth 3 (three) times daily. Quantity: 120 tablet  Refills: 3        CONTINUE taking these medications      Instructions Prescription details   FLEX-A-MIN JOINT FLEX OR      Take by mouth.  Takes two · HYDROmorphone HCl 4 MG Tabs     Please  your prescriptions at the location directed by your doctor or nurse    Bring a paper prescription for each of these medications  · mirtazapine 15 MG Tabs[RZ. 2]         Follow up [address] 1]  [place], Fentanyl is the name of a drug used to treat moderate to severe pain. It is a strong painkiller related to morphine.  The drug is contained within a sticky patch, and when applied to your body, gradually passes through your skin into your bloodstream. Once • You may experience pain while doing a particular activity despite being on the patch e.g. washing, walking around a lot. If you know an activity is likely to cause you pain, you can take a dose of your quick acting pain relief before you do the activity. If a patch is accidently transferred to another adult or a child, remove it immediately and seek medical advice.   Store patches safely and out of sight and reach of children    Different brands of patch  There are a few different brands of fentanyl patch a

## (undated) NOTE — LETTER
Hospital Discharge Documentation  Please phone to schedule a hospital follow up appointment.     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 She was supposed to start on immunotherapy today but she had progressive with symptoms of numbness and tingling in both lower extremities and inability to ambulate as of yesterday.   Today she was sent to t units every hour and 0.8 every 2 hours as needed- please resume at [id number] when she arrives  Give 1 mg of IV dilaudid before she gets discharged  Resume all home medications including fentanyl patch   Patient feels much better  Neurosurgery on b FLEX OR)  Take by mouth. Takes two tablets daily    Vitamin D3 (VITAMIN D3) 2000 UNITS Oral Cap  Take 2,000 Units by mouth daily. Multiple Vitamins Oral Tab  Take 1 tablet by mouth daily.       Osimertinib Mesylate 80 MG Oral Tab  Take 160 mg by mouth known as: ZOFRAN-ODT      Take 1 tablet (4 mg total) by mouth every 8 (eight) hours as needed for Nausea. Quantity: 60 tablet  Refills: 0     Osimertinib Mesylate 80 MG Tabs      Take 160 mg by mouth daily.    Quantity: 56 tablet  Refills: 11     PEG 3350

## (undated) NOTE — LETTER
Hospital Discharge Documentation  Please phone to schedule a hospital follow up appointment.     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 She chalks it up to the work that she did when she was nursing.   She had never had radicular symptoms before and the pain had without difficulty. She denies any bowel or bladder changes, no saddle anesthesia. No lower extremity weakness. She was admitted for  further evaluation and management of her back pain and to determine a follow-up plan for her lung mass.   The patient is Vitals:[VS.1] Blood pressure 151/90, pulse 73, temperature 98.5 °F (36.9 °C), temperature source Oral, resp. rate 20, height 5' (1.524 m), weight 144 lb (65.3 kg), SpO2 96 %, not currently breastfeeding. [LD.1]    Code:[VS.1] full[LD.2]    Diet:[VS.1] gener Aspirin EC Low Dose 81 MG Tbec  Generic drug: aspirin     Estrogens Conjugated 0.3 MG Tabs  Commonly known as: Premarin     Irbesartan 150 MG Tabs  Replaced by: losartan Potassium 50 MG Tabs           Where to Get Your Medications      These medications we

## (undated) NOTE — LETTER
[facility], [place]  Authorization for Invasive Procedures  1.  I hereby authorize Dr. Yoli Alejandro, my physician and whomever may be designated as the doctor's assistant, to perform the following operation and/or procedure:  COMPUTERIZED 4. Should the need arise during my operation or immediate post-operative period; I also consent to the administration of blood and/or blood products.  Further, I understand that despite careful testing and screening of blood and blood products, I may still 9. Patients having a sterilization procedure: I understand that if the procedure is successful the results will be permanent and it will therefore be impossible for me to inseminate, conceive or bear children.  I also understand that the procedure is intend

## (undated) NOTE — LETTER
[address] [place] [address]      Authorization for Surgical Operation and Procedure     Date:___________                                                                                                         Time:_______ 4.   Should the need arise during my operation or immediate post-operative period, I also consent to the administration of blood and/or blood products.   Further, I understand that despite careful testing and screening of blood or blood products by syl 8.   I recognize that in the event my procedure results in extended X-Ray/fluoroscopy time, I may develop a skin reaction. 9.  If I have a Do Not Attempt Resuscitation (DNAR) order in place, that status will be suspended while in the operating room, proc STATEMENT OF PHYSICIAN My signature below affirms that prior to the time of the procedure; I have explained to the patient and/or his/her legal representative, the risks and benefits involved in the proposed treatment and any reasonable alternative to the

## (undated) NOTE — LETTER
[address] [place] [address]      Authorization for Surgical Operation and Procedure     Date:___________                                                                                                         Time:_______ 4.   Should the need arise during my operation or immediate post-operative period, I also consent to the administration of blood and/or blood products.   Further, I understand that despite careful testing and screening of blood or blood products by syl 8.   I recognize that in the event my procedure results in extended X-Ray/fluoroscopy time, I may develop a skin reaction. 9.  If I have a Do Not Attempt Resuscitation (DNAR) order in place, that status will be suspended while in the operating room, proc STATEMENT OF PHYSICIAN My signature below affirms that prior to the time of the procedure; I have explained to the patient and/or his/her legal representative, the risks and benefits involved in the proposed treatment and any reasonable alternative to the

## (undated) NOTE — LETTER
November 8, 2018    Dee Dee Loomis MD  [address]     Patient: Marcy López   YOB: 1945   Date of Visit: 11/8/2018       Dear Dr. Slim Tatum MD:    Thank you for referring Marcy López to me for evaluation.  Her Alcohol/week: 0.0 oz    Drug use: Not on file      Medications:    Current Outpatient Medications:  Irbesartan 150 MG Oral Tab 1 tab BID Disp: 180 tablet Rfl: 4   Omega-3-acid Ethyl Esters (LOVAZA) 1 g Oral Cap Take 1 capsule (1 g total) by mouth daily Disc Sloping margin, Temporal crescent Sloping margin, Temporal crescent    C/D Ratio 0.6 0.7    Macula Normal Normal    Vessels Normal Normal    Periphery Normal Normal            Refraction     Wearing Rx       Sphere Cylinder Axis Add    Right -1.00 +0

## (undated) NOTE — LETTER
October 3, 2017    Rasheed Maria MD  [address]     Patient: Katya Prasad   YOB: 1945   Date of Visit: 10/3/2017       Dear Dr. Don Pierce MD:    Thank you for referring Katya Prasad to me for evaluation.  Here Smokeless tobacco: Never Used                      Alcohol use: No                Medications:    Current Outpatient Prescriptions:  Omega-3-acid Ethyl Esters (LOVAZA) 1 g Oral Cap Take 1 capsule (1 g total) by mouth daily.  D Fundus Exam       Right Left    Disc Sloping margin, Temporal crescent Sloping margin, Temporal crescent    C/D Ratio 0.6 0.7    Macula Normal Normal    Vessels Normal Normal    Periphery Normal Normal            Refraction     Wearing Rx       Sphere Cyl

## (undated) NOTE — LETTER
[address], [place]  Authorization for Invasive Procedures  1.  I hereby authorize Dr. Roge Harris, my physician and whomever may be designated as the doctor's assistant, to perform the following operation and/or procedure: Lumbar Epi 4. Should the need arise during my operation or immediate post-operative period; I also consent to the administration of blood and/or blood products.  Further, I understand that despite careful testing and screening of blood and blood products, I may still 9. Patients having a sterilization procedure: I understand that if the procedure is successful the results will be permanent and it will therefore be impossible for me to inseminate, conceive or bear children.  I also understand that the procedure is intend

## (undated) NOTE — LETTER
Hospital Discharge Documentation  Patient was transferred to [facility] for additional L1 Coprectomy and stabilization of the spine to be completed at by Dr. Elisabet Vaughn.  ([phone]) .  Discharged  via Ambulance through [facility].     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   She was supposed to start on immunotherapy today but she had extremities.     Hospital Course:   Pathological fracture of lumbar vertebra, initial encounter  Palliative care on board  On dilaudid PCA 0.2 units every hour and 0.8 every 2 hours as needed- please resume at [facility] when she arrives  Give 1 mg by mouth daily. simvastatin 10 MG Oral Tab  TAKE ONE TABLET BY MOUTH EVERY DAY IN THE EVENING    Misc Natural Products (FLEX-A-MIN JOINT FLEX OR)  Take by mouth.  Takes two tablets daily    Vitamin D3 (VITAMIN D3) 2000 UNITS Oral Cap  Take 2,000 Units by known as: Lovaza      Take 1 capsule (1 g total) by mouth daily. Quantity: 90 capsule  Refills: 3     ondansetron 4 MG Tbdp  Commonly known as: ZOFRAN-ODT      Take 1 tablet (4 mg total) by mouth every 8 (eight) hours as needed for Nausea.    Quantity: 60

## (undated) NOTE — IP AVS SNAPSHOT
Patient Demographics     Address  [address] [place] [address] [address]  [facility] [address] Phone  [phone] (Home) *Preferred*  [phone] [facility]) E-mail Address  [email or web address]      Emergency Contact(s)     Name Relation Home Work [address]   [facility] Gaurav Kelley MD         fentaNYL 25 MCG/HR Pt72  Commonly known as: [address] [email or web address] [place] 1 patch onto the skin every third day. For increasing cancer-related pain. Please fill. Edna Anand, APRN         FLEX-A-MIN JOINT FLEX OR  Next dose due:  Tomorrow m TAKE ONE TABLET BY MOUTH EVERY DAY IN THE EVENING   Joe BOWEN MD         Vitamin D3 50 MCG (2000 UT) Caps  Commonly known as: VITAMIN D3  Next dose due: Tomorrow morning      Take 2,000 Units by mouth daily.                    420-420-A - MAR ACTION RE Component Value Reference Range Flag Lab   Glucose 121 70 - 99 mg/dL H [facility])   Sodium 136 136 - 145 mmol/L — [facility])   Potassium 4.4 3.5 - 5.1 mmol/L — [facility])   Chloride 104 98 - 112 mmol/L — [facility])   CO2 30. 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   She was supposed to start on immunotherapy today but she had progressive wi SOCIAL HISTORY:  No tobacco, alcohol, or drug use. Lives with her family. At baseline independent for basic activities of daily living, but mobility has been progressively worse since her diagnosis, which had started as back pain.     REVIEW OF SYSTEMS: The patient will be admitted to general medical floor. IV Decadron. N.p.o.  Neurosurgery consult to evaluate patient for decompressive procedure. Also, Dr. Solomon Kumari from Oncology Service was notified. Pain control. Further recommendations to follow.     Dic Ms. Nancy Enamorado is a 68year old woman. She is a known cancer patient with back pain for at least a month. She started walking with a walker about a month ago, but this was intermittent. Yesterday her walking was worse, and she had difficulty standing.   Today, • Macular degeneration Neg         multiple   • Retinal detachment Neg         multiple   • Diabetes Neg[AZ. 2]        Social History[AZ. 1]  Social History    Tobacco Use      Smoking status: Never Smoker      Smokeless tobacco: Never Used    Alcohol use: N CONCLUSION:  1. There has been progression of a pathologic fracture of the L1 vertebral segment, with approximately 55-60% of vertebral body height and worsening retropulsion of fracture fragments.  There is additionally epidural and paraspinal extension of Ms. Chase Walsh has symptoms and findings on exam consistent with her pathologic L1 compression fracture and spinal cord compression. She has a fairly clear sensory level and fairly significant paraparesis that is of less than 24 hours duration.   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   She was supposed to start on immunotherapy today but she had progressive with symptoms of numbness and Pathological fracture of lumbar vertebra, initial encounter  Palliative care on board  On dilaudid PCA 0.2 units every hour and 0.8 every 2 hours as needed- please resume at [facility] when she arrives  Give 1 mg of IV dilaudid before she gets disc simvastatin 10 MG Oral Tab  TAKE ONE TABLET BY MOUTH EVERY DAY IN THE EVENING    Misc Natural Products (FLEX-A-MIN JOINT FLEX OR)  Take by mouth. Takes two tablets daily    Vitamin D3 (VITAMIN D3) 2000 UNITS Oral Cap  Take 2,000 Units by mouth daily. Take 1 capsule (1 g total) by mouth daily. Quantity: 90 capsule  Refills: 3     ondansetron 4 MG Tbdp  Commonly known as: ZOFRAN-ODT      Take 1 tablet (4 mg total) by mouth every 8 (eight) hours as needed for Nausea.    Quantity: 60 tablet  Refills: No notes of this type exist for this encounter. Occupational Therapy Notes (last 72 hours) (Notes from 3/2/2021  4:00 PM through 3/5/2021  4:00 PM)    No notes of this type exist for this encounter.      Video Swallow Study Notes    No notes of this typ